# Patient Record
Sex: FEMALE | Race: WHITE | NOT HISPANIC OR LATINO | Employment: FULL TIME | URBAN - METROPOLITAN AREA
[De-identification: names, ages, dates, MRNs, and addresses within clinical notes are randomized per-mention and may not be internally consistent; named-entity substitution may affect disease eponyms.]

---

## 2017-01-23 RX ORDER — DAPAGLIFLOZIN 10 MG/1
TABLET, FILM COATED ORAL
Qty: 30 TABLET | Refills: 0 | Status: SHIPPED | OUTPATIENT
Start: 2017-01-23 | End: 2017-02-16 | Stop reason: SDUPTHER

## 2017-02-15 RX ORDER — OSELTAMIVIR PHOSPHATE 75 MG/1
75 CAPSULE ORAL 2 TIMES DAILY
Qty: 10 CAPSULE | Refills: 0 | Status: SHIPPED | OUTPATIENT
Start: 2017-02-15 | End: 2017-04-03

## 2017-02-16 RX ORDER — DAPAGLIFLOZIN 10 MG/1
TABLET, FILM COATED ORAL
Qty: 30 TABLET | Refills: 0 | Status: SHIPPED | OUTPATIENT
Start: 2017-02-16 | End: 2017-04-03 | Stop reason: SDUPTHER

## 2017-04-03 ENCOUNTER — OFFICE VISIT (OUTPATIENT)
Dept: INTERNAL MEDICINE | Facility: CLINIC | Age: 57
End: 2017-04-03

## 2017-04-03 ENCOUNTER — HOSPITAL ENCOUNTER (OUTPATIENT)
Dept: GENERAL RADIOLOGY | Facility: HOSPITAL | Age: 57
Discharge: HOME | End: 2017-04-03
Attending: INTERNAL MEDICINE | Admitting: INTERNAL MEDICINE

## 2017-04-03 VITALS
SYSTOLIC BLOOD PRESSURE: 109 MMHG | WEIGHT: 203 LBS | TEMPERATURE: 98.1 F | DIASTOLIC BLOOD PRESSURE: 76 MMHG | HEIGHT: 61 IN | HEART RATE: 79 BPM | BODY MASS INDEX: 38.33 KG/M2 | OXYGEN SATURATION: 98 %

## 2017-04-03 DIAGNOSIS — E11.9 NON-INSULIN DEPENDENT TYPE 2 DIABETES MELLITUS (HCC): ICD-10-CM

## 2017-04-03 DIAGNOSIS — E03.8 OTHER SPECIFIED HYPOTHYROIDISM: ICD-10-CM

## 2017-04-03 DIAGNOSIS — M25.552 HIP PAIN, LEFT: ICD-10-CM

## 2017-04-03 DIAGNOSIS — E78.49 OTHER HYPERLIPIDEMIA: Primary | ICD-10-CM

## 2017-04-03 DIAGNOSIS — E66.9 SIMPLE OBESITY: ICD-10-CM

## 2017-04-03 PROCEDURE — 99214 OFFICE O/P EST MOD 30 MIN: CPT | Performed by: INTERNAL MEDICINE

## 2017-04-03 PROCEDURE — 73502 X-RAY EXAM HIP UNI 2-3 VIEWS: CPT

## 2017-04-03 RX ORDER — ROSUVASTATIN CALCIUM 20 MG/1
20 TABLET, COATED ORAL DAILY
Qty: 90 TABLET | Refills: 3 | Status: SHIPPED | OUTPATIENT
Start: 2017-04-03 | End: 2018-06-16 | Stop reason: SDUPTHER

## 2017-04-04 ENCOUNTER — APPOINTMENT (OUTPATIENT)
Dept: WOMENS IMAGING | Facility: HOSPITAL | Age: 57
End: 2017-04-04

## 2017-04-04 LAB
BASOPHILS # BLD AUTO: 0.05 10*3/MM3 (ref 0–0.2)
BASOPHILS NFR BLD AUTO: 0.4 % (ref 0–1.5)
EOSINOPHIL # BLD AUTO: 0.17 10*3/MM3 (ref 0–0.7)
EOSINOPHIL NFR BLD AUTO: 1.4 % (ref 0.3–6.2)
ERYTHROCYTE [DISTWIDTH] IN BLOOD BY AUTOMATED COUNT: 15.2 % (ref 11.7–13)
HCT VFR BLD AUTO: 44.7 % (ref 35.6–45.5)
HGB BLD-MCNC: 14.2 G/DL (ref 11.9–15.5)
IMM GRANULOCYTES # BLD: 0.03 10*3/MM3 (ref 0–0.03)
IMM GRANULOCYTES NFR BLD: 0.2 % (ref 0–0.5)
LYMPHOCYTES # BLD AUTO: 3.79 10*3/MM3 (ref 0.9–4.8)
LYMPHOCYTES NFR BLD AUTO: 31.1 % (ref 19.6–45.3)
MCH RBC QN AUTO: 26.3 PG (ref 26.9–32)
MCHC RBC AUTO-ENTMCNC: 31.8 G/DL (ref 32.4–36.3)
MCV RBC AUTO: 82.9 FL (ref 80.5–98.2)
MONOCYTES # BLD AUTO: 0.64 10*3/MM3 (ref 0.2–1.2)
MONOCYTES NFR BLD AUTO: 5.3 % (ref 5–12)
NEUTROPHILS # BLD AUTO: 7.5 10*3/MM3 (ref 1.9–8.1)
NEUTROPHILS NFR BLD AUTO: 61.6 % (ref 42.7–76)
PLATELET # BLD AUTO: 429 10*3/MM3 (ref 140–500)
RBC # BLD AUTO: 5.39 10*6/MM3 (ref 3.9–5.2)
WBC # BLD AUTO: 12.18 10*3/MM3 (ref 4.5–10.7)

## 2017-04-04 PROCEDURE — 77067 SCR MAMMO BI INCL CAD: CPT | Performed by: RADIOLOGY

## 2017-04-04 RX ORDER — FENOFIBRATE 160 MG/1
TABLET ORAL
Qty: 90 TABLET | Refills: 1 | Status: SHIPPED | OUTPATIENT
Start: 2017-04-04 | End: 2017-12-21 | Stop reason: SDUPTHER

## 2017-04-09 PROBLEM — E11.9 TYPE 2 DIABETES MELLITUS: Status: ACTIVE | Noted: 2017-01-21

## 2017-04-09 PROBLEM — E11.9 CONTROLLED TYPE 2 DIABETES MELLITUS WITHOUT COMPLICATION: Status: ACTIVE | Noted: 2017-01-21

## 2017-04-09 NOTE — PROGRESS NOTES
Subjective   Lynda Rueda is a 56 y.o. female.   She is here today for hyperlipidemia and non-insulin-dependent type 2 diabetes hypothyroidism obesity left hip pain  History of Present Illness   She is here today for hyperlipidemia non-insulin-dependent type 2 diabetes hyperthyroidism obesity and left hip pain  The following portions of the patient's history were reviewed and updated as appropriate: allergies, current medications, past family history, past medical history, past social history, past surgical history and problem list.    Review of Systems   Musculoskeletal: Positive for arthralgias (left hip).   All other systems reviewed and are negative.      Objective   Physical Exam   Constitutional: She is oriented to person, place, and time. She appears well-developed and well-nourished. She is cooperative.   HENT:   Head: Normocephalic and atraumatic.   Right Ear: Hearing, tympanic membrane, external ear and ear canal normal.   Left Ear: Hearing, tympanic membrane, external ear and ear canal normal.   Nose: Nose normal.   Mouth/Throat: Uvula is midline, oropharynx is clear and moist and mucous membranes are normal.   Eyes: Conjunctivae, EOM and lids are normal. Pupils are equal, round, and reactive to light.   Neck: Phonation normal. Neck supple. Carotid bruit is not present.   Cardiovascular: Normal rate, regular rhythm and normal heart sounds.  Exam reveals no gallop and no friction rub.    No murmur heard.  Pulmonary/Chest: Effort normal and breath sounds normal. No respiratory distress.   Abdominal: Soft. Bowel sounds are normal. She exhibits no distension and no mass. There is no hepatosplenomegaly. There is no tenderness. There is no rebound and no guarding. No hernia.   Musculoskeletal: She exhibits no edema.        Left hip: She exhibits tenderness.   Neurological: She is alert and oriented to person, place, and time. Coordination and gait normal.   Skin: Skin is warm and dry.   Psychiatric: She  has a normal mood and affect. Her speech is normal and behavior is normal. Judgment and thought content normal.   Nursing note and vitals reviewed.      Assessment/Plan   Lnyda was seen today for diabetes and insomnia.    Diagnoses and all orders for this visit:    Other hyperlipidemia  -     CBC & Differential    Non-insulin dependent type 2 diabetes mellitus  -     CBC & Differential    Other specified hypothyroidism  -     CBC & Differential    Simple obesity  -     CBC & Differential    Hip pain, left  -     XR hip w or wo pelvis 2-3 view left    Other orders  -     rosuvastatin (CRESTOR) 20 MG tablet; Take 1 tablet by mouth Daily.  -     Dapagliflozin Propanediol (FARXIGA) 10 MG tablet; Take 1 tablet/day by mouth Daily. 1 tablet daily        Hyperlipidemia keep LDL less than 70 with proper diet exercise medication  NIDDM follow hemoglobin A 1C with yearly ophthalmology visits  Obesity weight loss with proper diet exercise medication  Left hip pain x-ray of hip

## 2017-04-18 ENCOUNTER — OFFICE VISIT (OUTPATIENT)
Dept: ORTHOPEDIC SURGERY | Facility: CLINIC | Age: 57
End: 2017-04-18

## 2017-04-18 VITALS — WEIGHT: 204 LBS | HEIGHT: 61 IN | TEMPERATURE: 97.6 F | BODY MASS INDEX: 38.51 KG/M2

## 2017-04-18 DIAGNOSIS — M25.552 PAIN OF LEFT HIP JOINT: Primary | ICD-10-CM

## 2017-04-18 PROCEDURE — 99203 OFFICE O/P NEW LOW 30 MIN: CPT | Performed by: NURSE PRACTITIONER

## 2017-04-18 RX ORDER — LANCETS 33 GAUGE
EACH MISCELLANEOUS
COMMUNITY
Start: 2017-04-03 | End: 2018-03-01

## 2017-04-18 RX ORDER — ERGOCALCIFEROL 1.25 MG/1
50000 CAPSULE ORAL
COMMUNITY
Start: 2017-04-06

## 2017-04-18 NOTE — PROGRESS NOTES
Patient: Lynda Rueda  YOB: 1960 56 y.o. female  Medical Record Number: [id number]    Chief Complaints:   Chief Complaint   Patient presents with   • Right Hip - Pain   • Left Hip - Pain       History of Present Illness:Lynda Rueda is a 56 y.o. female who presentsWith complaints of hip pain left greater than right.  Patient reports it started about 6-8 months ago.  Denies any specific injury.  She describes the hip pain as a moderate to severe intermittent stabbing type pain with popping and clicking.  Sitting makes the pain worse rest makes the pain slightly better.  She recently saw her primary care physician and they sent her for x-rays.  X-rays showed no arthritis but questionable impingement morphology.    Allergies:   Allergies   Allergen Reactions   • Celecoxib Hives   • Methocarbamol Hives   • Montelukast Hives   • Penicillins Hives   • Carbomer Other (See Comments)     UNKNOWN REACTION   • [place] Other (See Comments)     UNKNOWN PER PT   • Gemifloxacin    • Nickel Other (See Comments)     WHEN PT TOOK THE ALLERGY TEST NICKEL, ACRYLIC AND COBALT CAME UP AS ALLERGIES       Medications:   Current Outpatient Prescriptions   Medication Sig Dispense Refill   • citalopram (CeleXA) 20 MG tablet TAKE ONE TABLET BY MOUTH DAILY 90 tablet 1   • Dapagliflozin Propanediol (FARXIGA) 10 MG tablet Take 1 tablet/day by mouth Daily. 1 tablet daily 90 tablet 3   • estradiol (MINIVELLE, VIVELLE-DOT) 0.05 MG/24HR patch Place 1 patch on the skin 2 (two) times a week. AS DIRECTED     • fenofibrate 160 MG tablet TAKE ONE TABLET BY MOUTH DAILY 90 tablet 1   • glipiZIDE (GLUCOTROL) 10 MG tablet Take 1 tablet (10 mg total) by mouth 2 (two) times a day. 180 tablet 2   • glucose blood test strip OneTouch Ultra Blue In Vitro Strip; Patient Sig: OneTouch Ultra Blue In Vitro Strip ; 100; 0; 11-Nov-2013; Active     • ONETOUCH DELICA LANCETS 33G misc      • rosuvastatin (CRESTOR) 20 MG tablet Take 1 tablet by  "mouth Daily. 90 tablet 3   • sitaGLIPtin-metFORMIN (JANUMET)  MG per tablet Take 1 tablet by mouth 2 (Two) Times a Day With Meals.     • SYNTHROID 125 MCG tablet      • valsartan-hydrochlorothiazide (DIOVAN-HCT) 80-12.5 MG per tablet TAKE ONE TABLET BY MOUTH EVERY DAY 90 tablet 1   • vitamin D (ERGOCALCIFEROL) 08268 UNITS capsule capsule      • triamcinolone (KENALOG) 0.1 % ointment As Needed.       No current facility-administered medications for this visit.          The following portions of the patient's history were reviewed and updated as appropriate: allergies, current medications, past family history, past medical history, past social history, past surgical history and problem list.    Review of Systems:   A 14 point review of systems was performed. All systems negative except pertinent positives/negative listed in HPI above    Physical Exam:   Vitals:    04/18/17 1447   Temp: 97.6 °F (36.4 °C)   Weight: 204 lb (92.5 kg)   Height: 61\" (154.9 cm)       General: A and O x 3, ASA, NAD    SCLERA:    Normal    DENTITION:   NormalSkin clear no unusual lesions noted  Bilateral hips patient is nontender palpation she has pain upon internal extra rotation with a positive Stinchfield on the left negative on the right negative logroll bilaterally neurologic intact pedal pulses normal bilaterally    Radiology:  Xrays previous x-rays of the hips were reviewed and the patient appears to have a cam lesions noted bilaterally left greater than right.    Assessment/Plan:  Questionable labral tear left hip    We will proceed with an MR arthrogram of the left hip bursa.  She may very well have a labral tear.  She will call us couple days after the MRI arthrogram to discuss results and treatment options.  We will hold off on workup of the right hip since the left hip is worse at this point.  "

## 2017-06-02 ENCOUNTER — TELEPHONE (OUTPATIENT)
Dept: INTERNAL MEDICINE | Facility: CLINIC | Age: 57
End: 2017-06-02

## 2017-06-02 RX ORDER — METHYLPREDNISOLONE 4 MG/1
TABLET ORAL
Qty: 21 TABLET | Refills: 0 | Status: SHIPPED | OUTPATIENT
Start: 2017-06-02 | End: 2017-08-23

## 2017-06-14 RX ORDER — CITALOPRAM 20 MG/1
20 TABLET ORAL DAILY
Qty: 90 TABLET | Refills: 1 | Status: SHIPPED | OUTPATIENT
Start: 2017-06-14 | End: 2017-12-10 | Stop reason: SDUPTHER

## 2017-06-14 RX ORDER — VALSARTAN AND HYDROCHLOROTHIAZIDE 80; 12.5 MG/1; MG/1
1 TABLET, FILM COATED ORAL DAILY
Qty: 90 TABLET | Refills: 1 | Status: SHIPPED | OUTPATIENT
Start: 2017-06-14 | End: 2017-12-10 | Stop reason: SDUPTHER

## 2017-07-21 DIAGNOSIS — J38.00 VOCAL CORD PARALYSIS: Primary | ICD-10-CM

## 2017-08-23 ENCOUNTER — OFFICE VISIT (OUTPATIENT)
Dept: INTERNAL MEDICINE | Facility: CLINIC | Age: 57
End: 2017-08-23

## 2017-08-23 VITALS
RESPIRATION RATE: 16 BRPM | DIASTOLIC BLOOD PRESSURE: 80 MMHG | SYSTOLIC BLOOD PRESSURE: 118 MMHG | HEIGHT: 61 IN | TEMPERATURE: 97.3 F | HEART RATE: 87 BPM | BODY MASS INDEX: 37.76 KG/M2 | OXYGEN SATURATION: 97 % | WEIGHT: 200 LBS

## 2017-08-23 DIAGNOSIS — M54.5 ACUTE MIDLINE LOW BACK PAIN, WITH SCIATICA PRESENCE UNSPECIFIED: ICD-10-CM

## 2017-08-23 DIAGNOSIS — R10.9 ABDOMINAL CRAMPING: Primary | ICD-10-CM

## 2017-08-23 DIAGNOSIS — R10.9 FLANK PAIN: ICD-10-CM

## 2017-08-23 PROCEDURE — 99213 OFFICE O/P EST LOW 20 MIN: CPT | Performed by: NURSE PRACTITIONER

## 2017-08-23 RX ORDER — METFORMIN HYDROCHLORIDE 500 MG/1
1000 TABLET, EXTENDED RELEASE ORAL 2 TIMES DAILY
COMMUNITY
Start: 2017-07-17

## 2017-08-23 NOTE — PROGRESS NOTES
"Vitals:    08/23/17 1538   BP: 118/80   Pulse: 87   Resp: 16   Temp: 97.3 °F (36.3 °C)   SpO2: 97%     Last 2 weights    08/23/17  1538   Weight: 200 lb (90.7 kg)     Social History   Substance Use Topics   • Smoking status: Never Smoker   • Smokeless tobacco: Not on file   • Alcohol use Yes      Comment: SOCIAL DRINKER        Subjective     HPI  Pt presents to office today with low back pain that originated in spinal area and radiates outward, and also some abdominal discomfort which pt describes as \"someone pinching inside of her\". She states that this has been going on for several days and the pain isn't excrusiating but bothersome. She does lift heavy object at her work. She does not have a gallbladder, appendix, and has had a hysterectomy done. Last colonoscopy was normal. She denies fever, n/v, UTI symptoms, URI symptoms, weight loss, night sweats, changes in bowel movements. She has regular BM daily, and urinating without issues.    The following portions of the patient's history were reviewed and updated as appropriate: allergies, current medications, past medical history, past social history and problem list.    Review of Systems   Constitutional: Negative.    Respiratory: Negative.    Cardiovascular: Negative.    Gastrointestinal: Positive for abdominal pain (discomfort).   Musculoskeletal: Positive for back pain (lumbar).       Objective     Physical Exam   Constitutional: She is oriented to person, place, and time. Vital signs are normal. She appears well-developed and well-nourished.   HENT:   Head: Normocephalic and atraumatic.   Neck: Normal range of motion.   Cardiovascular: Normal rate, regular rhythm and normal heart sounds.    Pulmonary/Chest: Effort normal and breath sounds normal.   Abdominal: There is tenderness in the suprapubic area.   Musculoskeletal: Normal range of motion.   Neurological: She is oriented to person, place, and time.   Nursing note and vitals reviewed.      Assessment/Plan "   Lynda was seen today for abdominal pain and back pain.    Diagnoses and all orders for this visit:    Abdominal cramping  -     CBC & Differential  -     Comprehensive Metabolic Panel  -     Urinalysis With / Culture If Indicated  -     Lipase  -     Amylase    Flank pain  -     CBC & Differential  -     Comprehensive Metabolic Panel  -     Urinalysis With / Culture If Indicated  -     Lipase  -     Amylase    Acute midline low back pain, with sciatica presence unspecified           -poss nerve related problem? Check labs for starting point.   -cont home meds  -nsaids, rest, ice/heat therapy  -cont home meds  -FU prn or if symptoms persist/worsen

## 2017-08-25 ENCOUNTER — OFFICE VISIT (OUTPATIENT)
Dept: ORTHOPEDIC SURGERY | Facility: CLINIC | Age: 57
End: 2017-08-25

## 2017-08-25 VITALS — WEIGHT: 200 LBS | HEIGHT: 61 IN | BODY MASS INDEX: 37.76 KG/M2 | TEMPERATURE: 98 F

## 2017-08-25 DIAGNOSIS — G89.29 CHRONIC PAIN OF RIGHT KNEE: Primary | ICD-10-CM

## 2017-08-25 DIAGNOSIS — M25.561 CHRONIC PAIN OF RIGHT KNEE: Primary | ICD-10-CM

## 2017-08-25 LAB
ALBUMIN SERPL-MCNC: 4.6 G/DL (ref 3.5–5.2)
ALBUMIN/GLOB SERPL: 1.6 G/DL
ALP SERPL-CCNC: 70 U/L (ref 39–117)
ALT SERPL-CCNC: 20 U/L (ref 1–33)
AMYLASE SERPL-CCNC: 32 U/L (ref 28–100)
APPEARANCE UR: CLEAR
AST SERPL-CCNC: 22 U/L (ref 1–32)
BACTERIA #/AREA URNS HPF: ABNORMAL /HPF
BACTERIA UR CULT: NORMAL
BACTERIA UR CULT: NORMAL
BASOPHILS # BLD AUTO: 0.08 10*3/MM3 (ref 0–0.2)
BASOPHILS NFR BLD AUTO: 0.7 % (ref 0–1.5)
BILIRUB SERPL-MCNC: 0.3 MG/DL (ref 0.1–1.2)
BILIRUB UR QL STRIP: NEGATIVE
BUN SERPL-MCNC: 9 MG/DL (ref 6–20)
BUN/CREAT SERPL: 15 (ref 7–25)
CALCIUM SERPL-MCNC: 10.3 MG/DL (ref 8.6–10.5)
CHLORIDE SERPL-SCNC: 96 MMOL/L (ref 98–107)
CO2 SERPL-SCNC: 28.7 MMOL/L (ref 22–29)
COLOR UR: YELLOW
CREAT SERPL-MCNC: 0.6 MG/DL (ref 0.57–1)
EOSINOPHIL # BLD AUTO: 0.12 10*3/MM3 (ref 0–0.7)
EOSINOPHIL NFR BLD AUTO: 1.1 % (ref 0.3–6.2)
EPI CELLS #/AREA URNS HPF: ABNORMAL /HPF
ERYTHROCYTE [DISTWIDTH] IN BLOOD BY AUTOMATED COUNT: 13.9 % (ref 11.7–13)
GLOBULIN SER CALC-MCNC: 2.9 GM/DL
GLUCOSE SERPL-MCNC: 173 MG/DL (ref 65–99)
GLUCOSE UR QL: ABNORMAL
HCT VFR BLD AUTO: 44.5 % (ref 35.6–45.5)
HGB BLD-MCNC: 13.9 G/DL (ref 11.9–15.5)
HGB UR QL STRIP: NEGATIVE
IMM GRANULOCYTES # BLD: 0.03 10*3/MM3 (ref 0–0.03)
IMM GRANULOCYTES NFR BLD: 0.3 % (ref 0–0.5)
KETONES UR QL STRIP: NEGATIVE
LEUKOCYTE ESTERASE UR QL STRIP: NEGATIVE
LIPASE SERPL-CCNC: 56 U/L (ref 13–60)
LYMPHOCYTES # BLD AUTO: 3.67 10*3/MM3 (ref 0.9–4.8)
LYMPHOCYTES NFR BLD AUTO: 32.9 % (ref 19.6–45.3)
MCH RBC QN AUTO: 26.5 PG (ref 26.9–32)
MCHC RBC AUTO-ENTMCNC: 31.2 G/DL (ref 32.4–36.3)
MCV RBC AUTO: 84.8 FL (ref 80.5–98.2)
MICRO URNS: ABNORMAL
MICRO URNS: ABNORMAL
MONOCYTES # BLD AUTO: 0.79 10*3/MM3 (ref 0.2–1.2)
MONOCYTES NFR BLD AUTO: 7.1 % (ref 5–12)
MUCOUS THREADS URNS QL MICRO: PRESENT /HPF
NEUTROPHILS # BLD AUTO: 6.48 10*3/MM3 (ref 1.9–8.1)
NEUTROPHILS NFR BLD AUTO: 57.9 % (ref 42.7–76)
NITRITE UR QL STRIP: NEGATIVE
PH UR STRIP: 6 [PH] (ref 5–7.5)
PLATELET # BLD AUTO: 362 10*3/MM3 (ref 140–500)
POTASSIUM SERPL-SCNC: 3.8 MMOL/L (ref 3.5–5.2)
PROT SERPL-MCNC: 7.5 G/DL (ref 6–8.5)
PROT UR QL STRIP: NEGATIVE
RBC # BLD AUTO: 5.25 10*6/MM3 (ref 3.9–5.2)
RBC #/AREA URNS HPF: ABNORMAL /HPF
SODIUM SERPL-SCNC: 139 MMOL/L (ref 136–145)
SP GR UR: >=1.03 (ref 1–1.03)
URINALYSIS REFLEX: ABNORMAL
UROBILINOGEN UR STRIP-MCNC: 0.2 MG/DL (ref 0.2–1)
WBC # BLD AUTO: 11.17 10*3/MM3 (ref 4.5–10.7)
WBC #/AREA URNS HPF: ABNORMAL /HPF

## 2017-08-25 PROCEDURE — 99213 OFFICE O/P EST LOW 20 MIN: CPT | Performed by: NURSE PRACTITIONER

## 2017-08-25 PROCEDURE — 73562 X-RAY EXAM OF KNEE 3: CPT | Performed by: NURSE PRACTITIONER

## 2017-08-25 RX ORDER — NITROFURANTOIN 25; 75 MG/1; MG/1
100 CAPSULE ORAL 2 TIMES DAILY
Qty: 14 CAPSULE | Refills: 0 | Status: SHIPPED | OUTPATIENT
Start: 2017-08-25 | End: 2018-03-01

## 2017-08-25 RX ORDER — NITROFURANTOIN 25; 75 MG/1; MG/1
100 CAPSULE ORAL 2 TIMES DAILY
Qty: 14 CAPSULE | Refills: 0 | Status: SHIPPED | OUTPATIENT
Start: 2017-08-25 | End: 2017-08-25 | Stop reason: SDUPTHER

## 2017-08-25 NOTE — PROGRESS NOTES
"Patient: Lynda Rueda  YOB: 1960 56 y.o. female  Medical Record Number: [id number]    Chief Complaints:   Chief Complaint   Patient presents with   • Right Knee - Establish Care, Pain       History of Present Illness:Lynda Rueda is a 56 y.o. female who presents With complaints of acute onset onset of right knee pain.  Apparently the patient tripped 3 weeks ago while walking across the room and fell into a wall.  She started with moderate to severe intermittent right knee pain at that time and describes it as a stabbing burning type pain with intermittent swelling.  She reports that the pain is worse with sitting and driving and slightly better with ibuprofen.  She had a previous right knee arthroscopy for torn meniscus and reports\" it feels exactly the same\", the patient reports occasional locking and catching of the knee as well.    Allergies:   Allergies   Allergen Reactions   • Celecoxib Hives   • Methocarbamol Hives   • Montelukast Hives   • Penicillins Hives   • Carbomer Other (See Comments)     UNKNOWN REACTION   • [place] Other (See Comments)     UNKNOWN PER PT   • Gemifloxacin    • Nickel Other (See Comments)     WHEN PT TOOK THE ALLERGY TEST NICKEL, ACRYLIC AND COBALT CAME UP AS ALLERGIES       Medications:   Current Outpatient Prescriptions   Medication Sig Dispense Refill   • citalopram (CeleXA) 20 MG tablet Take 1 tablet by mouth Daily. 90 tablet 1   • Dapagliflozin Propanediol (FARXIGA) 10 MG tablet Take 1 tablet/day by mouth Daily. 1 tablet daily 90 tablet 3   • glipiZIDE (GLUCOTROL) 10 MG tablet Take 1 tablet (10 mg total) by mouth 2 (two) times a day. 180 tablet 2   • metFORMIN ER (GLUCOPHAGE-XR) 500 MG 24 hr tablet Take 1,000 mg by mouth.     • rosuvastatin (CRESTOR) 20 MG tablet Take 1 tablet by mouth Daily. 90 tablet 3   • valsartan-hydrochlorothiazide (DIOVAN-HCT) 80-12.5 MG per tablet Take 1 tablet by mouth Daily. 90 tablet 1   • estradiol (MINIVELLE, VIVELLE-DOT) 0.05 " "MG/24HR patch Place 1 patch on the skin 2 (two) times a week. AS DIRECTED     • fenofibrate 160 MG tablet TAKE ONE TABLET BY MOUTH DAILY 90 tablet 1   • glucose blood test strip OneTouch Ultra Blue In Vitro Strip; Patient Sig: OneTouch Ultra Blue In Vitro Strip ; 100; 0; 11-Nov-2013; Active     • linagliptin (TRADJENTA) 5 MG tablet tablet Take 5 mg by mouth.     • nitrofurantoin, macrocrystal-monohydrate, (MACROBID) 100 MG capsule Take 1 capsule by mouth 2 (Two) Times a Day. 14 capsule 0   • ONETOUCH DELICA LANCETS 33G misc      • sitaGLIPtin-metFORMIN (JANUMET)  MG per tablet Take 1 tablet by mouth 2 (Two) Times a Day With Meals.     • SYNTHROID 125 MCG tablet      • triamcinolone (KENALOG) 0.1 % ointment As Needed.     • vitamin D (ERGOCALCIFEROL) 80765 UNITS capsule capsule        No current facility-administered medications for this visit.          The following portions of the patient's history were reviewed and updated as appropriate: allergies, current medications, past family history, past medical history, past social history, past surgical history and problem list.    Review of Systems:   A 14 point review of systems was performed. All systems negative except pertinent positives/negative listed in HPI above    Physical Exam:   Vitals:    08/25/17 1556   Temp: 98 °F (36.7 °C)   TempSrc: Temporal Artery    Weight: 200 lb (90.7 kg)   Height: 61\" (154.9 cm)       General: A and O x 3, ASA, NAD    SCLERA:    Normal    DENTITION:   Normal  Skin clear no unusual lesions noted  Right knee patient does have trace amount of effusion noted with 115° flexion neutral in extension with a positive Samir negative Lockman calf is soft and nontender    Radiology:  Xrays 3views (ap,lateral, sunrise) right knee were ordered and reviewed today secondary to pain and show mild arthritic changes of the medial compartment, comparative views show progression in arthritis     Assessment/Plan:  Right knee pain with " mechanical symptoms coupled with mild arthritic changes    The patient was adamant that she has had no problems prior to her recent injury.  She would like to proceed with MRI of the right knee to see what it looks like.  I explained to the patient she does have some arthritic changes and knee arthroscopy at this point may not be an option.  However we will look further into the knee and also check her meniscus and make a decision after we get the results back.

## 2017-08-30 ENCOUNTER — OFFICE VISIT (OUTPATIENT)
Dept: ORTHOPEDIC SURGERY | Facility: CLINIC | Age: 57
End: 2017-08-30

## 2017-08-30 DIAGNOSIS — M25.561 CHRONIC PAIN OF RIGHT KNEE: ICD-10-CM

## 2017-08-30 DIAGNOSIS — G89.29 CHRONIC PAIN OF RIGHT KNEE: ICD-10-CM

## 2017-08-30 PROCEDURE — 73721 MRI JNT OF LWR EXTRE W/O DYE: CPT | Performed by: NURSE PRACTITIONER

## 2017-09-01 ENCOUNTER — TELEPHONE (OUTPATIENT)
Dept: ORTHOPEDIC SURGERY | Facility: CLINIC | Age: 57
End: 2017-09-01

## 2017-09-01 NOTE — TELEPHONE ENCOUNTER
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  Unfortunately a knee scope would not be helpful at this point.  Would recommend that she come in for a cortisone injection and physical therapy.

## 2017-09-05 ENCOUNTER — TELEPHONE (OUTPATIENT)
Dept: ORTHOPEDIC SURGERY | Facility: CLINIC | Age: 57
End: 2017-09-05

## 2017-09-05 NOTE — TELEPHONE ENCOUNTER
----- Message from Anna Johansen MA sent at 9/1/2017  4:59 PM EDT -----  Please give the patient a call in regards to getting her on the schedule for a Cortisone injection.

## 2017-09-08 ENCOUNTER — CLINICAL SUPPORT (OUTPATIENT)
Dept: ORTHOPEDIC SURGERY | Facility: CLINIC | Age: 57
End: 2017-09-08

## 2017-09-08 VITALS — TEMPERATURE: 98.7 F | WEIGHT: 201 LBS | BODY MASS INDEX: 37.95 KG/M2 | HEIGHT: 61 IN

## 2017-09-08 DIAGNOSIS — M25.561 CHRONIC PAIN OF RIGHT KNEE: Primary | ICD-10-CM

## 2017-09-08 DIAGNOSIS — G89.29 CHRONIC PAIN OF RIGHT KNEE: Primary | ICD-10-CM

## 2017-09-08 PROCEDURE — 20610 DRAIN/INJ JOINT/BURSA W/O US: CPT | Performed by: NURSE PRACTITIONER

## 2017-09-08 RX ORDER — BUPIVACAINE HYDROCHLORIDE 5 MG/ML
2 INJECTION, SOLUTION EPIDURAL; INTRACAUDAL
Status: COMPLETED | OUTPATIENT
Start: 2017-09-08 | End: 2017-09-08

## 2017-09-08 RX ORDER — METHYLPREDNISOLONE ACETATE 80 MG/ML
80 INJECTION, SUSPENSION INTRA-ARTICULAR; INTRALESIONAL; INTRAMUSCULAR; SOFT TISSUE
Status: COMPLETED | OUTPATIENT
Start: 2017-09-08 | End: 2017-09-08

## 2017-09-08 RX ADMIN — METHYLPREDNISOLONE ACETATE 80 MG: 80 INJECTION, SUSPENSION INTRA-ARTICULAR; INTRALESIONAL; INTRAMUSCULAR; SOFT TISSUE at 14:59

## 2017-09-08 RX ADMIN — BUPIVACAINE HYDROCHLORIDE 2 ML: 5 INJECTION, SOLUTION EPIDURAL; INTRACAUDAL at 14:59

## 2017-09-08 NOTE — PROGRESS NOTES
9/8/2017    Lynda Rueda is here today for worsening knee pain. Pt has undergone injection of the knee in the past with good resolution of symptoms. Pt is requesting a repeat injection.     KNEE Injection Procedure Note:    Large Joint Arthrocentesis  Date/Time: 9/8/2017 2:59 PM  Consent given by: patient  Site marked: site marked  Timeout: Immediately prior to procedure a time out was called to verify the correct patient, procedure, equipment, support staff and site/side marked as required   Supporting Documentation  Indications: pain and joint swelling   Procedure Details  Location: knee - R knee  Preparation: Patient was prepped and draped in the usual sterile fashion  Needle size: 18 G  Approach: anterolateral  Medications administered: 2 mL bupivacaine (PF) 0.5 %; 80 mg methylPREDNISolone acetate 80 MG/ML  Patient tolerance: patient tolerated the procedure well with no immediate complications      Prior to the injection complications including but not limited to pain, diabetes, infection, were discussed and patient verbalized understanding and would like to proceed with injection    At the conclusion of the injection I discussed the importance of continued quad strengthening exercises on a daily basis. I will see the patient back if the symptoms should fail to improve or worsen.    Eugenia Gupta, APRN  9/8/2017

## 2017-11-27 ENCOUNTER — TELEPHONE (OUTPATIENT)
Dept: INTERNAL MEDICINE | Facility: CLINIC | Age: 57
End: 2017-11-27

## 2017-11-27 NOTE — TELEPHONE ENCOUNTER
Pt called stating she has a terrible sinus infection and soul like to know if you would call her in meds.

## 2017-11-28 ENCOUNTER — TELEPHONE (OUTPATIENT)
Dept: INTERNAL MEDICINE | Facility: CLINIC | Age: 57
End: 2017-11-28

## 2017-11-28 RX ORDER — AZITHROMYCIN 250 MG/1
TABLET, FILM COATED ORAL
Qty: 6 TABLET | Refills: 0 | Status: SHIPPED | OUTPATIENT
Start: 2017-11-28 | End: 2018-03-01

## 2017-11-28 RX ORDER — METHYLPREDNISOLONE 4 MG/1
TABLET ORAL
Qty: 21 TABLET | Refills: 0 | Status: SHIPPED | OUTPATIENT
Start: 2017-11-28 | End: 2018-03-01

## 2017-12-11 RX ORDER — VALSARTAN AND HYDROCHLOROTHIAZIDE 80; 12.5 MG/1; MG/1
TABLET, FILM COATED ORAL
Qty: 90 TABLET | Refills: 1 | Status: SHIPPED | OUTPATIENT
Start: 2017-12-11 | End: 2018-04-05

## 2017-12-11 RX ORDER — CITALOPRAM 20 MG/1
TABLET ORAL
Qty: 90 TABLET | Refills: 1 | Status: SHIPPED | OUTPATIENT
Start: 2017-12-11 | End: 2018-06-07 | Stop reason: SDUPTHER

## 2017-12-21 RX ORDER — FENOFIBRATE 160 MG/1
TABLET ORAL
Qty: 90 TABLET | Refills: 0 | Status: SHIPPED | OUTPATIENT
Start: 2017-12-21 | End: 2018-03-19 | Stop reason: SDUPTHER

## 2018-03-01 ENCOUNTER — HOSPITAL ENCOUNTER (OUTPATIENT)
Dept: CARDIOLOGY | Facility: HOSPITAL | Age: 58
Discharge: HOME | End: 2018-03-01
Attending: INTERNAL MEDICINE

## 2018-03-01 ENCOUNTER — OFFICE VISIT (OUTPATIENT)
Dept: INTERNAL MEDICINE | Facility: CLINIC | Age: 58
End: 2018-03-01

## 2018-03-01 ENCOUNTER — HOSPITAL ENCOUNTER (OUTPATIENT)
Dept: CARDIOLOGY | Facility: HOSPITAL | Age: 58
Discharge: HOME | End: 2018-03-01
Admitting: INTERNAL MEDICINE

## 2018-03-01 VITALS
DIASTOLIC BLOOD PRESSURE: 58 MMHG | SYSTOLIC BLOOD PRESSURE: 94 MMHG | OXYGEN SATURATION: 96 % | HEIGHT: 61 IN | BODY MASS INDEX: 37.76 KG/M2 | HEART RATE: 78 BPM | WEIGHT: 200 LBS

## 2018-03-01 VITALS
WEIGHT: 200 LBS | SYSTOLIC BLOOD PRESSURE: 94 MMHG | DIASTOLIC BLOOD PRESSURE: 58 MMHG | HEART RATE: 77 BPM | HEIGHT: 61 IN | BODY MASS INDEX: 37.76 KG/M2 | OXYGEN SATURATION: 96 %

## 2018-03-01 VITALS
TEMPERATURE: 98 F | HEIGHT: 61 IN | DIASTOLIC BLOOD PRESSURE: 77 MMHG | BODY MASS INDEX: 38.51 KG/M2 | HEART RATE: 97 BPM | OXYGEN SATURATION: 96 % | SYSTOLIC BLOOD PRESSURE: 110 MMHG | WEIGHT: 204 LBS

## 2018-03-01 DIAGNOSIS — R07.89 CHEST PRESSURE: Primary | ICD-10-CM

## 2018-03-01 DIAGNOSIS — R07.2 PRECORDIAL PAIN: ICD-10-CM

## 2018-03-01 DIAGNOSIS — R07.9 CHEST PAIN, UNSPECIFIED TYPE: Primary | ICD-10-CM

## 2018-03-01 DIAGNOSIS — I10 ESSENTIAL HYPERTENSION: ICD-10-CM

## 2018-03-01 DIAGNOSIS — E11.9 TYPE 2 DIABETES MELLITUS WITHOUT COMPLICATION, WITHOUT LONG-TERM CURRENT USE OF INSULIN (HCC): ICD-10-CM

## 2018-03-01 DIAGNOSIS — R94.31 EKG, ABNORMAL: ICD-10-CM

## 2018-03-01 DIAGNOSIS — R01.1 MURMUR: ICD-10-CM

## 2018-03-01 DIAGNOSIS — E78.2 MIXED HYPERLIPIDEMIA: ICD-10-CM

## 2018-03-01 LAB
ALBUMIN SERPL-MCNC: 4.3 G/DL (ref 3.5–5.2)
ALBUMIN/GLOB SERPL: 1.7 G/DL
ALP SERPL-CCNC: 45 U/L (ref 39–117)
ALT SERPL W P-5'-P-CCNC: 16 U/L (ref 1–33)
ANION GAP SERPL CALCULATED.3IONS-SCNC: 13.2 MMOL/L
ASCENDING AORTA: 2.8 CM
AST SERPL-CCNC: 16 U/L (ref 1–32)
BASOPHILS # BLD AUTO: 0.04 10*3/MM3 (ref 0–0.2)
BASOPHILS NFR BLD AUTO: 0.4 % (ref 0–1.5)
BH CV ECHO MEAS - ACS: 2.1 CM
BH CV ECHO MEAS - AO MAX PG (FULL): 1 MMHG
BH CV ECHO MEAS - AO MAX PG: 12.6 MMHG
BH CV ECHO MEAS - AO MEAN PG (FULL): 1.6 MMHG
BH CV ECHO MEAS - AO MEAN PG: 7.6 MMHG
BH CV ECHO MEAS - AO ROOT AREA (BSA CORRECTED): 1.4
BH CV ECHO MEAS - AO ROOT AREA: 5.7 CM^2
BH CV ECHO MEAS - AO ROOT DIAM: 2.7 CM
BH CV ECHO MEAS - AO V2 MAX: 177.4 CM/SEC
BH CV ECHO MEAS - AO V2 MEAN: 132.8 CM/SEC
BH CV ECHO MEAS - AO V2 VTI: 34.6 CM
BH CV ECHO MEAS - AVA(I,A): 2.9 CM^2
BH CV ECHO MEAS - AVA(I,D): 2.9 CM^2
BH CV ECHO MEAS - AVA(V,A): 2.8 CM^2
BH CV ECHO MEAS - AVA(V,D): 2.8 CM^2
BH CV ECHO MEAS - BSA(HAYCOCK): 2 M^2
BH CV ECHO MEAS - BSA: 1.9 M^2
BH CV ECHO MEAS - BZI_BMI: 37.8 KILOGRAMS/M^2
BH CV ECHO MEAS - BZI_METRIC_HEIGHT: 154.9 CM
BH CV ECHO MEAS - BZI_METRIC_WEIGHT: 90.7 KG
BH CV ECHO MEAS - CONTRAST EF (2CH): 63.5 ML/M^2
BH CV ECHO MEAS - CONTRAST EF 4CH: 60.4 ML/M^2
BH CV ECHO MEAS - EDV(MOD-SP2): 63 ML
BH CV ECHO MEAS - EDV(MOD-SP4): 53 ML
BH CV ECHO MEAS - EDV(TEICH): 103.6 ML
BH CV ECHO MEAS - EF(CUBED): 75.1 %
BH CV ECHO MEAS - EF(MOD-SP2): 63.5 %
BH CV ECHO MEAS - EF(MOD-SP4): 60.4 %
BH CV ECHO MEAS - EF(TEICH): 67 %
BH CV ECHO MEAS - ESV(MOD-SP2): 23 ML
BH CV ECHO MEAS - ESV(MOD-SP4): 21 ML
BH CV ECHO MEAS - ESV(TEICH): 34.2 ML
BH CV ECHO MEAS - FS: 37.1 %
BH CV ECHO MEAS - IVS/LVPW: 1.1
BH CV ECHO MEAS - IVSD: 0.86 CM
BH CV ECHO MEAS - LAT PEAK E' VEL: 12 CM/SEC
BH CV ECHO MEAS - LV DIASTOLIC VOL/BSA (35-75): 28.1 ML/M^2
BH CV ECHO MEAS - LV MASS(C)D: 128 GRAMS
BH CV ECHO MEAS - LV MASS(C)DI: 67.8 GRAMS/M^2
BH CV ECHO MEAS - LV MAX PG: 11.6 MMHG
BH CV ECHO MEAS - LV MEAN PG: 6 MMHG
BH CV ECHO MEAS - LV SYSTOLIC VOL/BSA (12-30): 11.1 ML/M^2
BH CV ECHO MEAS - LV V1 MAX: 170.2 CM/SEC
BH CV ECHO MEAS - LV V1 MEAN: 113.6 CM/SEC
BH CV ECHO MEAS - LV V1 VTI: 35.2 CM
BH CV ECHO MEAS - LVIDD: 4.7 CM
BH CV ECHO MEAS - LVIDS: 3 CM
BH CV ECHO MEAS - LVLD AP2: 7.5 CM
BH CV ECHO MEAS - LVLD AP4: 7.1 CM
BH CV ECHO MEAS - LVLS AP2: 5.9 CM
BH CV ECHO MEAS - LVLS AP4: 5.7 CM
BH CV ECHO MEAS - LVOT AREA (M): 2.8 CM^2
BH CV ECHO MEAS - LVOT AREA: 2.9 CM^2
BH CV ECHO MEAS - LVOT DIAM: 1.9 CM
BH CV ECHO MEAS - LVPWD: 0.79 CM
BH CV ECHO MEAS - MED PEAK E' VEL: 11 CM/SEC
BH CV ECHO MEAS - MR MAX PG: 20.8 MMHG
BH CV ECHO MEAS - MR MAX VEL: 227.9 CM/SEC
BH CV ECHO MEAS - MV A DUR: 0.14 SEC
BH CV ECHO MEAS - MV A MAX VEL: 103.3 CM/SEC
BH CV ECHO MEAS - MV DEC SLOPE: 316 CM/SEC^2
BH CV ECHO MEAS - MV DEC TIME: 0.24 SEC
BH CV ECHO MEAS - MV E MAX VEL: 76.2 CM/SEC
BH CV ECHO MEAS - MV E/A: 0.74
BH CV ECHO MEAS - MV MAX PG: 4.5 MMHG
BH CV ECHO MEAS - MV MEAN PG: 2 MMHG
BH CV ECHO MEAS - MV P1/2T MAX VEL: 78.1 CM/SEC
BH CV ECHO MEAS - MV P1/2T: 72.4 MSEC
BH CV ECHO MEAS - MV V2 MAX: 105.7 CM/SEC
BH CV ECHO MEAS - MV V2 MEAN: 65.7 CM/SEC
BH CV ECHO MEAS - MV V2 VTI: 29.4 CM
BH CV ECHO MEAS - MVA P1/2T LCG: 2.8 CM^2
BH CV ECHO MEAS - MVA(P1/2T): 3 CM^2
BH CV ECHO MEAS - MVA(VTI): 3.5 CM^2
BH CV ECHO MEAS - PA ACC TIME: 0.16 SEC
BH CV ECHO MEAS - PA MAX PG (FULL): 2.2 MMHG
BH CV ECHO MEAS - PA MAX PG: 5.2 MMHG
BH CV ECHO MEAS - PA PR(ACCEL): 7.7 MMHG
BH CV ECHO MEAS - PA V2 MAX: 114.4 CM/SEC
BH CV ECHO MEAS - PULM A REVS DUR: 0.12 SEC
BH CV ECHO MEAS - PULM A REVS VEL: 61.1 CM/SEC
BH CV ECHO MEAS - PULM DIAS VEL: 45.6 CM/SEC
BH CV ECHO MEAS - PULM S/D: 1.3
BH CV ECHO MEAS - PULM SYS VEL: 59.7 CM/SEC
BH CV ECHO MEAS - PVA(V,A): 2.4 CM^2
BH CV ECHO MEAS - PVA(V,D): 2.4 CM^2
BH CV ECHO MEAS - QP/QS: 0.59
BH CV ECHO MEAS - RAP SYSTOLE: 3 MMHG
BH CV ECHO MEAS - RV MAX PG: 3 MMHG
BH CV ECHO MEAS - RV MEAN PG: 1.7 MMHG
BH CV ECHO MEAS - RV V1 MAX: 86.8 CM/SEC
BH CV ECHO MEAS - RV V1 MEAN: 62.5 CM/SEC
BH CV ECHO MEAS - RV V1 VTI: 19.4 CM
BH CV ECHO MEAS - RVOT AREA: 3.1 CM^2
BH CV ECHO MEAS - RVOT DIAM: 2 CM
BH CV ECHO MEAS - SI(AO): 104.8 ML/M^2
BH CV ECHO MEAS - SI(CUBED): 42 ML/M^2
BH CV ECHO MEAS - SI(LVOT): 53.8 ML/M^2
BH CV ECHO MEAS - SI(MOD-SP2): 21.2 ML/M^2
BH CV ECHO MEAS - SI(MOD-SP4): 16.9 ML/M^2
BH CV ECHO MEAS - SI(TEICH): 36.8 ML/M^2
BH CV ECHO MEAS - SUP REN AO DIAM: 1.9 CM
BH CV ECHO MEAS - SV(AO): 197.9 ML
BH CV ECHO MEAS - SV(CUBED): 79.3 ML
BH CV ECHO MEAS - SV(LVOT): 101.6 ML
BH CV ECHO MEAS - SV(MOD-SP2): 40 ML
BH CV ECHO MEAS - SV(MOD-SP4): 32 ML
BH CV ECHO MEAS - SV(RVOT): 60.4 ML
BH CV ECHO MEAS - SV(TEICH): 69.5 ML
BH CV ECHO MEAS - TAPSE (>1.6): 2.2 CM2
BH CV NUCLEAR PRIOR STUDY: 2
BH CV STRESS BP STAGE 1: NORMAL
BH CV STRESS COMMENTS STAGE 1: NORMAL
BH CV STRESS DOSE REGADENOSON STAGE 1: 0.4
BH CV STRESS DURATION MIN STAGE 1: 0
BH CV STRESS DURATION SEC STAGE 1: 10
BH CV STRESS HR STAGE 1: 146
BH CV STRESS PROTOCOL 1: NORMAL
BH CV STRESS RECOVERY BP: NORMAL MMHG
BH CV STRESS RECOVERY HR: 93 BPM
BH CV STRESS STAGE 1: 1
BH CV XLRA - RV BASE: 2.8 CM
BH CV XLRA - TDI S': 11 CM/SEC
BILIRUB SERPL-MCNC: 0.3 MG/DL (ref 0.1–1.2)
BUN BLD-MCNC: 19 MG/DL (ref 6–20)
BUN/CREAT SERPL: 26.8 (ref 7–25)
CALCIUM SPEC-SCNC: 9.3 MG/DL (ref 8.6–10.5)
CHLORIDE SERPL-SCNC: 100 MMOL/L (ref 98–107)
CO2 SERPL-SCNC: 25.8 MMOL/L (ref 22–29)
CREAT BLD-MCNC: 0.71 MG/DL (ref 0.57–1)
D DIMER PPP FEU-MCNC: 0.31 MCGFEU/ML (ref 0–0.49)
DEPRECATED RDW RBC AUTO: 40.4 FL (ref 37–54)
E/E' RATIO: 6.5
EOSINOPHIL # BLD AUTO: 0.15 10*3/MM3 (ref 0–0.7)
EOSINOPHIL NFR BLD AUTO: 1.5 % (ref 0.3–6.2)
ERYTHROCYTE [DISTWIDTH] IN BLOOD BY AUTOMATED COUNT: 13.5 % (ref 11.7–13)
GFR SERPL CREATININE-BSD FRML MDRD: 85 ML/MIN/1.73
GLOBULIN UR ELPH-MCNC: 2.6 GM/DL
GLUCOSE BLD-MCNC: 97 MG/DL (ref 65–99)
HCT VFR BLD AUTO: 42.4 % (ref 35.6–45.5)
HGB BLD-MCNC: 13.9 G/DL (ref 11.9–15.5)
IMM GRANULOCYTES # BLD: 0.02 10*3/MM3 (ref 0–0.03)
IMM GRANULOCYTES NFR BLD: 0.2 % (ref 0–0.5)
LEFT ATRIUM VOLUME INDEX: 16 ML/M2
LV EF NUC BP: 79 %
LYMPHOCYTES # BLD AUTO: 3.34 10*3/MM3 (ref 0.9–4.8)
LYMPHOCYTES NFR BLD AUTO: 33.2 % (ref 19.6–45.3)
MAXIMAL PREDICTED HEART RATE: 163 BPM
MAXIMAL PREDICTED HEART RATE: 163 BPM
MCH RBC QN AUTO: 26.8 PG (ref 26.9–32)
MCHC RBC AUTO-ENTMCNC: 32.8 G/DL (ref 32.4–36.3)
MCV RBC AUTO: 81.9 FL (ref 80.5–98.2)
MONOCYTES # BLD AUTO: 0.58 10*3/MM3 (ref 0.2–1.2)
MONOCYTES NFR BLD AUTO: 5.8 % (ref 5–12)
NEUTROPHILS # BLD AUTO: 5.93 10*3/MM3 (ref 1.9–8.1)
NEUTROPHILS NFR BLD AUTO: 58.9 % (ref 42.7–76)
NRBC BLD MANUAL-RTO: 0 /100 WBC (ref 0–0)
NT-PROBNP SERPL-MCNC: 19.2 PG/ML (ref 0–900)
PERCENT MAX PREDICTED HR: 89.57 %
PLATELET # BLD AUTO: 361 10*3/MM3 (ref 140–500)
PMV BLD AUTO: 9.5 FL (ref 6–12)
POTASSIUM BLD-SCNC: 3.9 MMOL/L (ref 3.5–5.2)
PROT SERPL-MCNC: 6.9 G/DL (ref 6–8.5)
RBC # BLD AUTO: 5.18 10*6/MM3 (ref 3.9–5.2)
SINUS: 2.7 CM
SODIUM BLD-SCNC: 139 MMOL/L (ref 136–145)
STJ: 2.5 CM
STRESS BASELINE BP: NORMAL MMHG
STRESS BASELINE HR: 85 BPM
STRESS PERCENT HR: 105 %
STRESS POST EXERCISE DUR SEC: 10 SEC
STRESS POST PEAK BP: NORMAL MMHG
STRESS POST PEAK HR: 146 BPM
STRESS TARGET HR: 139 BPM
STRESS TARGET HR: 139 BPM
TROPONIN T SERPL-MCNC: <0.01 NG/ML (ref 0–0.03)
WBC NRBC COR # BLD: 10.06 10*3/MM3 (ref 4.5–10.7)

## 2018-03-01 PROCEDURE — 99213 OFFICE O/P EST LOW 20 MIN: CPT | Performed by: INTERNAL MEDICINE

## 2018-03-01 PROCEDURE — 83880 ASSAY OF NATRIURETIC PEPTIDE: CPT | Performed by: INTERNAL MEDICINE

## 2018-03-01 PROCEDURE — 25010000002 REGADENOSON 0.4 MG/5ML SOLUTION: Performed by: INTERNAL MEDICINE

## 2018-03-01 PROCEDURE — 93018 CV STRESS TEST I&R ONLY: CPT | Performed by: INTERNAL MEDICINE

## 2018-03-01 PROCEDURE — 80053 COMPREHEN METABOLIC PANEL: CPT | Performed by: INTERNAL MEDICINE

## 2018-03-01 PROCEDURE — 93017 CV STRESS TEST TRACING ONLY: CPT

## 2018-03-01 PROCEDURE — 93016 CV STRESS TEST SUPVJ ONLY: CPT | Performed by: INTERNAL MEDICINE

## 2018-03-01 PROCEDURE — 36415 COLL VENOUS BLD VENIPUNCTURE: CPT

## 2018-03-01 PROCEDURE — 93306 TTE W/DOPPLER COMPLETE: CPT | Performed by: INTERNAL MEDICINE

## 2018-03-01 PROCEDURE — 93000 ELECTROCARDIOGRAM COMPLETE: CPT | Performed by: INTERNAL MEDICINE

## 2018-03-01 PROCEDURE — 78452 HT MUSCLE IMAGE SPECT MULT: CPT

## 2018-03-01 PROCEDURE — 99244 OFF/OP CNSLTJ NEW/EST MOD 40: CPT | Performed by: INTERNAL MEDICINE

## 2018-03-01 PROCEDURE — 94760 N-INVAS EAR/PLS OXIMETRY 1: CPT

## 2018-03-01 PROCEDURE — 0 TECHNETIUM TETROFOSMIN KIT: Performed by: INTERNAL MEDICINE

## 2018-03-01 PROCEDURE — 85025 COMPLETE CBC W/AUTO DIFF WBC: CPT | Performed by: INTERNAL MEDICINE

## 2018-03-01 PROCEDURE — 93306 TTE W/DOPPLER COMPLETE: CPT

## 2018-03-01 PROCEDURE — A9502 TC99M TETROFOSMIN: HCPCS | Performed by: INTERNAL MEDICINE

## 2018-03-01 PROCEDURE — 84484 ASSAY OF TROPONIN QUANT: CPT | Performed by: INTERNAL MEDICINE

## 2018-03-01 PROCEDURE — 78452 HT MUSCLE IMAGE SPECT MULT: CPT | Performed by: INTERNAL MEDICINE

## 2018-03-01 PROCEDURE — 25010000002 AMINOPHYLLINE PER 250 MG: Performed by: INTERNAL MEDICINE

## 2018-03-01 PROCEDURE — 85379 FIBRIN DEGRADATION QUANT: CPT | Performed by: INTERNAL MEDICINE

## 2018-03-01 RX ORDER — SODIUM CHLORIDE 0.9 % (FLUSH) 0.9 %
10 SYRINGE (ML) INJECTION AS NEEDED
Status: DISCONTINUED | OUTPATIENT
Start: 2018-03-01 | End: 2019-01-11

## 2018-03-01 RX ORDER — AMINOPHYLLINE DIHYDRATE 25 MG/ML
125 INJECTION, SOLUTION INTRAVENOUS ONCE
Status: COMPLETED | OUTPATIENT
Start: 2018-03-01 | End: 2018-03-01

## 2018-03-01 RX ORDER — NITROGLYCERIN 0.4 MG/1
0.4 TABLET SUBLINGUAL
Status: DISCONTINUED | OUTPATIENT
Start: 2018-03-01 | End: 2019-01-11

## 2018-03-01 RX ADMIN — TETROFOSMIN 1 DOSE: 1.38 INJECTION, POWDER, LYOPHILIZED, FOR SOLUTION INTRAVENOUS at 11:35

## 2018-03-01 RX ADMIN — AMINOPHYLLINE 125 MG: 25 INJECTION, SOLUTION INTRAVENOUS at 13:05

## 2018-03-01 RX ADMIN — TETROFOSMIN 1 DOSE: 1.38 INJECTION, POWDER, LYOPHILIZED, FOR SOLUTION INTRAVENOUS at 13:00

## 2018-03-01 RX ADMIN — REGADENOSON 0.4 MG: 0.08 INJECTION, SOLUTION INTRAVENOUS at 13:00

## 2018-03-01 NOTE — ADDENDUM NOTE
Encounter addended by: Hilda Reyna MD on: 3/1/2018  2:56 PM<BR>     Actions taken: Sign clinical note

## 2018-03-01 NOTE — ADDENDUM NOTE
Encounter addended by: Hilda Reyna MD on: 3/1/2018 11:35 AM<BR>     Actions taken: LOS modified, Sign clinical note

## 2018-03-01 NOTE — PROGRESS NOTES
Procedure     ECG 12 Lead  Date/Time: 3/1/2018 9:31 AM  Performed by: FATOU SCHNEIDER  Authorized by: FATOU SCHNEIDER   Comparison: not compared with previous ECG   Previous ECG: no previous ECG available  Rhythm: sinus rhythm  Rate: normal  Conduction: conduction normal  QRS axis: normal  Clinical impression: abnormal ECG  Comments: Probable inf infarct

## 2018-03-01 NOTE — PROGRESS NOTES
"57 y.o.           Celecoxib; Methocarbamol; Montelukast; Penicillins; Carbomer; Cobalt; Gemifloxacin; and Nickel    Ramon Vázquez MD    Chief Complaint   Patient presents with   • Chest Pain     States chest pressure x 1 week.  Feels like \"a brick is on my chest\".  Denies , sob, dizziness. Last time she had this was 18 years ago.  Lots of heartburn lately       Past Medical History:   Diagnosis Date   • Chronic hepatitis B    • Diabetes    • H/O colonoscopy 2012   • Hepatitis C virus    • Thyroid disease        Past Surgical History:   Procedure Laterality Date   • APPENDECTOMY     • BREAST BIOPSY     •  SECTION     • CHOLECYSTECTOMY     • DILATATION AND CURETTAGE     • HYSTERECTOMY     • KNEE SURGERY Left    • THYROIDECTOMY  2016       Social History     Social History   • Marital status:      Social History Main Topics   • Smoking status: Never Smoker   • Smokeless tobacco: Never Used   • Alcohol use Yes      Comment: SOCIAL DRINKER    • Drug use: Defer   • Sexual activity: Defer       Family History   Problem Relation Age of Onset   • COPD Mother    • Hyperlipidemia Mother    • Hypertension Mother    • Melanoma Mother    • Heart failure Mother    • Transient ischemic attack Mother    • Colon cancer Father        There were no vitals filed for this visit.      Current Outpatient Prescriptions:   •  citalopram (CeleXA) 20 MG tablet, TAKE 1 TABLET BY MOUTH DAILY., Disp: 90 tablet, Rfl: 1  •  fenofibrate 160 MG tablet, TAKE 1 TABLET BY MOUTH EVERY DAY, Disp: 90 tablet, Rfl: 0  •  glipiZIDE (GLUCOTROL) 10 MG tablet, Take 1 tablet (10 mg total) by mouth 2 (two) times a day., Disp: 180 tablet, Rfl: 2  •  glucose blood test strip, OneTouch Ultra Blue In Vitro Strip; Patient Sig: OneTouch Ultra Blue In Vitro Strip ; 100; 0; 2013; Active, Disp: , Rfl:   •  INVOKANA 300 MG tablet, TAKE 1 TABLET DAILY, Disp: , Rfl: 1  •  linagliptin (TRADJENTA) 5 MG tablet tablet, Take 5 mg by " mouth., Disp: , Rfl:   •  metFORMIN ER (GLUCOPHAGE-XR) 500 MG 24 hr tablet, Take 1,000 mg by mouth., Disp: , Rfl:   •  ondansetron ODT (ZOFRAN ODT) 8 MG disintegrating tablet, Take 1 tablet by mouth Every 8 (Eight) Hours As Needed for Nausea or Vomiting., Disp: 10 tablet, Rfl: 0  •  rosuvastatin (CRESTOR) 20 MG tablet, Take 1 tablet by mouth Daily., Disp: 90 tablet, Rfl: 3  •  sitaGLIPtin-metFORMIN (JANUMET)  MG per tablet, Take 1 tablet by mouth 2 (Two) Times a Day With Meals., Disp: , Rfl:   •  SYNTHROID 125 MCG tablet, , Disp: , Rfl:   •  triamcinolone (KENALOG) 0.1 % ointment, As Needed., Disp: , Rfl:   •  valsartan-hydrochlorothiazide (DIOVAN-HCT) 80-12.5 MG per tablet, TAKE 1 TABLET BY MOUTH DAILY., Disp: 90 tablet, Rfl: 1  •  vitamin D (ERGOCALCIFEROL) 57273 UNITS capsule capsule, , Disp: , Rfl:     Current Facility-Administered Medications:   •  nitroglycerin (NITROSTAT) SL tablet 0.4 mg, 0.4 mg, Sublingual, Q5 Min PRN, Hilda Reyna MD  •  sodium chloride 0.9 % flush 10 mL, 10 mL, Intravenous, PRN, Hilda Reyna MD

## 2018-03-01 NOTE — PROGRESS NOTES
Date of Office Visit: 2018  Encounter Provider: Hilda Reyna MD  Place of Service: [facility] CARDIOLOGY  Patient Name: Lynda Rueda  :1960      Patient ID:  Lynda Rueda is a 57 y.o. female is here for chest heaviness and fatigue.           History of Present Illness    She was referred by Dr. Chance Vázquez for chest pressure.    Mrs Marybeth boss is a 57-year-old female who [facility].  She's had 1 week of progressive chest heaviness brought on with activity and relieved with rest.  She's had progressive fatigue.  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  Normally she goes up and down stairs without tell.  Now she has to stop [employer] on the stairs because she gets chest heaviness and she feels so fatigued.  She's been sleeping at home from 6 PM until 5 in the morning.  This is unusual for her.  She doesn't feel her heart racing or skipping.  She also has been noticing diaphoresis with activity which is also unusual for her.    She has diabetes mellitus type 2.  She is sedentary and obese.  She has hypertension and hyperlipidemia.  She's never had myocardial infarction, TIA, stroke, heart failure.  She does not smoke and has very rare alcohol.  There is no family history of premature cardiovascular disease.  She has no COPD or asthma.  There is no history of cancer or blood clots for her.  She has no renal disease or liver disease.    She's never had anything like this before.    Her ECG done at her primary care physician's office is normal.    Past Medical History:   Diagnosis Date   • Chronic hepatitis B    • Diabetes    • H/O colonoscopy 2012   • Hepatitis C virus    • Thyroid disease          Past Surgical History:   Procedure Laterality Date   • APPENDECTOMY     • BREAST BIOPSY     •  SECTION     • CHOLECYSTECTOMY     • DILATATION AND CURETTAGE     • HYSTERECTOMY     • KNEE SURGERY  Left 2013   • THYROIDECTOMY  07/2016       Current Outpatient Prescriptions on File Prior to Encounter   Medication Sig Dispense Refill   • citalopram (CeleXA) 20 MG tablet TAKE 1 TABLET BY MOUTH DAILY. 90 tablet 1   • fenofibrate 160 MG tablet TAKE 1 TABLET BY MOUTH EVERY DAY 90 tablet 0   • glipiZIDE (GLUCOTROL) 10 MG tablet Take 1 tablet (10 mg total) by mouth 2 (two) times a day. 180 tablet 2   • glucose blood test strip OneTouch Ultra Blue In Vitro Strip; Patient Sig: OneTouch Ultra Blue In Vitro Strip ; 100; 0; 11-Nov-2013; Active     • INVOKANA 300 MG tablet TAKE 1 TABLET DAILY  1   • linagliptin (TRADJENTA) 5 MG tablet tablet Take 5 mg by mouth.     • metFORMIN ER (GLUCOPHAGE-XR) 500 MG 24 hr tablet Take 1,000 mg by mouth.     • rosuvastatin (CRESTOR) 20 MG tablet Take 1 tablet by mouth Daily. 90 tablet 3   • SYNTHROID 125 MCG tablet      • valsartan-hydrochlorothiazide (DIOVAN-HCT) 80-12.5 MG per tablet TAKE 1 TABLET BY MOUTH DAILY. 90 tablet 1   • vitamin D (ERGOCALCIFEROL) 47385 UNITS capsule capsule      • ondansetron ODT (ZOFRAN ODT) 8 MG disintegrating tablet Take 1 tablet by mouth Every 8 (Eight) Hours As Needed for Nausea or Vomiting. 10 tablet 0   • sitaGLIPtin-metFORMIN (JANUMET)  MG per tablet Take 1 tablet by mouth 2 (Two) Times a Day With Meals.     • triamcinolone (KENALOG) 0.1 % ointment As Needed.     • [DISCONTINUED] azithromycin (ZITHROMAX Z-AMINTA) 250 MG tablet Take 2 tablets the first day, then 1 tablet daily for 4 days. 6 tablet 0   • [DISCONTINUED] cefdinir (OMNICEF) 300 MG capsule 2 capsules (at the same time) once a day 20 capsule 0   • [DISCONTINUED] Chlorcyclizine-Pseudoephed (STAHIST AD) 25-60 MG tablet One tablet three times a day as needed for congestion 30 tablet 0   • [DISCONTINUED] Dapagliflozin Propanediol (FARXIGA) 10 MG tablet Take 1 tablet/day by mouth Daily. 1 tablet daily 90 tablet 3   • [DISCONTINUED] estradiol (MINIVELLE, VIVELLE-DOT) 0.05 MG/24HR patch Place 1  patch on the skin 2 (two) times a week. AS DIRECTED     • [DISCONTINUED] MethylPREDNISolone (MEDROL, AMINTA,) 4 MG tablet Take as directed on package instructions. 21 tablet 0   • [DISCONTINUED] nitrofurantoin, macrocrystal-monohydrate, (MACROBID) 100 MG capsule Take 1 capsule by mouth 2 (Two) Times a Day. 14 capsule 0   • [DISCONTINUED] ONETOUCH DELICA LANCETS 33G misc      • [DISCONTINUED] oseltamivir (TAMIFLU) 75 MG capsule 1 capsule PO BID 10 capsule 0     No current facility-administered medications on file prior to encounter.        Social History     Social History   • Marital status:      Spouse name: N/A   • Number of children: N/A   • Years of education: N/A     Occupational History   • Not on file.     Social History Main Topics   • Smoking status: Never Smoker   • Smokeless tobacco: Never Used   • Alcohol use Yes      Comment: SOCIAL DRINKER    • Drug use: Defer   • Sexual activity: Defer     Other Topics Concern   • Not on file     Social History Narrative           Review of Systems   Constitution: Positive for malaise/fatigue.   HENT: Negative for congestion.    Eyes: Negative for vision loss in left eye and vision loss in right eye.   Respiratory: Positive for shortness of breath. Negative for cough, hemoptysis, sleep disturbances due to breathing, snoring, sputum production and wheezing.    Endocrine: Negative.    Hematologic/Lymphatic: Negative.    Skin: Negative for poor wound healing and rash.   Musculoskeletal: Negative for falls, gout, muscle cramps and myalgias.   Gastrointestinal: Negative for abdominal pain, diarrhea, dysphagia, hematemesis, melena, nausea and vomiting.   Neurological: Negative for excessive daytime sleepiness, dizziness, headaches, light-headedness, loss of balance, seizures and vertigo.   Psychiatric/Behavioral: Negative for depression and substance abuse. The patient is not nervous/anxious.        Procedures  Procedures       Objective:      Vitals:    03/01/18 1027  "03/01/18 1029   BP: 97/57 94/58   Pulse: 78    SpO2: 96%    Weight:  90.7 kg (200 lb)   Height:  154.9 cm (61\")     Body mass index is 37.79 kg/(m^2).    Physical Exam   Constitutional: She is oriented to person, place, and time. She appears well-developed and well-nourished. No distress.   HENT:   Head: Normocephalic and atraumatic.   Eyes: Conjunctivae are normal. No scleral icterus.   Neck: Neck supple. No JVD present. Carotid bruit is not present. No thyromegaly present.   Cardiovascular: Normal rate, regular rhythm, S1 normal, S2 normal and intact distal pulses.   No extrasystoles are present. PMI is not displaced.  Exam reveals no gallop.    Murmur heard.   Harsh midsystolic murmur is present with a grade of 3/6  at the upper right sternal border, upper left sternal border  Pulses:       Carotid pulses are 2+ on the right side with bruit, and 2+ on the left side with bruit.       Radial pulses are 2+ on the right side, and 2+ on the left side.        Dorsalis pedis pulses are 2+ on the right side, and 2+ on the left side.        Posterior tibial pulses are 2+ on the right side, and 2+ on the left side.   Pulmonary/Chest: Effort normal and breath sounds normal. No respiratory distress. She has no wheezes. She has no rhonchi. She has no rales. She exhibits no tenderness.   Abdominal: Soft. Bowel sounds are normal. She exhibits no distension, no abdominal bruit and no mass. There is no tenderness.   Musculoskeletal: She exhibits no edema or deformity.   Lymphadenopathy:     She has no cervical adenopathy.   Neurological: She is alert and oriented to person, place, and time. No cranial nerve deficit.   Skin: Skin is warm and dry. No rash noted. She is not diaphoretic. No cyanosis. No pallor. Nails show no clubbing.   Psychiatric: She has a normal mood and affect. Judgment normal.   Vitals reviewed.      Lab Review:       Assessment:      Diagnosis Plan   1. Chest pain, unspecified type  Cardiac Monitoring    Vital " Signs - Once    Vital Signs - As Needed    Pulse Oximetry    Oxygen Therapy- Nasal Cannula; Titrate for SPO2: 92%, equal to or greater than    Insert Peripheral IV    sodium chloride 0.9 % flush 10 mL    nitroglycerin (NITROSTAT) SL tablet 0.4 mg    NPO Diet    Bathroom Privileges With Assistance    CBC & Differential    Comprehensive Metabolic Panel    Troponin T    D-dimer    proBNP    Cardiac Monitoring    Vital Signs - Once    Insert Peripheral IV    NPO Diet    Bathroom Privileges With Assistance    Troponin T    Troponin T    D-dimer    D-dimer    proBNP    proBNP    CBC Auto Differential   2. Murmur  Adult Transthoracic Echo Complete W/ Cont if Necessary Per Protocol   3. Essential hypertension     4. Mixed hyperlipidemia     5. Type 2 diabetes mellitus without complication, without long-term current use of insulin     6. Precordial pain  Stress Test With Myocardial Perfusion One Day     1.   chest pain.  Set up stress study to be done today.  1. Murmur on examination, set up echocardiogram  2. Hypertension, well controlled  3. Diabetes mellitus, type II.  Her last hemoglobin A1c was 8 done within the last 3 months.  4. Hyperlipidemia, on medication for this  5. Obesity and sedentary lifestyle.       Plan:       See plan above    Addendum: Her troponin, CMP, CBC and d-dimer are unremarkable.  Her stress nuclear perfusion study and 2-D echocardiogram were normal.  There is no evidence of ischemia or cardiomyopathy.

## 2018-03-08 ENCOUNTER — TELEPHONE (OUTPATIENT)
Dept: CARDIOLOGY | Facility: HOSPITAL | Age: 58
End: 2018-03-08

## 2018-03-19 RX ORDER — FENOFIBRATE 160 MG/1
TABLET ORAL
Qty: 90 TABLET | Refills: 0 | Status: SHIPPED | OUTPATIENT
Start: 2018-03-19 | End: 2018-06-16 | Stop reason: SDUPTHER

## 2018-03-25 NOTE — PROGRESS NOTES
Subjective   Lynda Rueda is a 57 y.o. female.   She is here today for chest pressure and we will follow-up for physical later time  History of Present Illness   She is here today for chest pressure which comes and goes and not related to activity or rest and going on for about a week now  The following portions of the patient's history were reviewed and updated as appropriate: allergies, current medications, past family history, past medical history, past social history, past surgical history and problem list.    Review of Systems   Cardiovascular: Positive for chest pain.   All other systems reviewed and are negative.      Objective   Physical Exam   Constitutional: She is oriented to person, place, and time. She appears well-developed and well-nourished. She is cooperative.   HENT:   Head: Normocephalic and atraumatic.   Right Ear: Hearing, tympanic membrane, external ear and ear canal normal.   Left Ear: Hearing, tympanic membrane, external ear and ear canal normal.   Nose: Nose normal.   Mouth/Throat: Uvula is midline, oropharynx is clear and moist and mucous membranes are normal.   Eyes: Conjunctivae, EOM and lids are normal. Pupils are equal, round, and reactive to light.   Neck: Phonation normal. Neck supple. Carotid bruit is not present.   Cardiovascular: Normal rate, regular rhythm and normal heart sounds.  Exam reveals no gallop and no friction rub.    No murmur heard.  Pulmonary/Chest: Effort normal and breath sounds normal. No respiratory distress.   Abdominal: Soft. Bowel sounds are normal. She exhibits no distension and no mass. There is no hepatosplenomegaly. There is no tenderness. There is no rebound and no guarding. No hernia.   Musculoskeletal: She exhibits no edema.   Neurological: She is alert and oriented to person, place, and time. Coordination and gait normal.   Skin: Skin is warm and dry.   Psychiatric: She has a normal mood and affect. Her speech is normal and behavior is normal.  Judgment and thought content normal.   Nursing note and vitals reviewed.      Assessment/Plan   Diagnoses and all orders for this visit:    Chest pressure  -     ECG 12 Lead    EKG, abnormal  -     ECG 12 Lead      Chest pressure EKG twelve-lead is stable and we will follow from there and she will see cardiology as well  Abnormal EKG well EKG is abnormal mildly it is still stable and she will follow-up with cardiology or we will send her to the chest pain center  In this case we have sent her to the chest pain center for further workup immediately since she is diabetic with physical exam later time

## 2018-04-05 ENCOUNTER — APPOINTMENT (OUTPATIENT)
Dept: WOMENS IMAGING | Facility: HOSPITAL | Age: 58
End: 2018-04-05

## 2018-04-05 ENCOUNTER — OFFICE VISIT (OUTPATIENT)
Dept: INTERNAL MEDICINE | Facility: CLINIC | Age: 58
End: 2018-04-05

## 2018-04-05 VITALS
HEIGHT: 61 IN | DIASTOLIC BLOOD PRESSURE: 65 MMHG | RESPIRATION RATE: 16 BRPM | SYSTOLIC BLOOD PRESSURE: 108 MMHG | WEIGHT: 203 LBS | BODY MASS INDEX: 38.33 KG/M2 | HEART RATE: 102 BPM | TEMPERATURE: 97.4 F | OXYGEN SATURATION: 96 %

## 2018-04-05 DIAGNOSIS — K21.00 GASTROESOPHAGEAL REFLUX DISEASE WITH ESOPHAGITIS: Primary | ICD-10-CM

## 2018-04-05 DIAGNOSIS — Z79.899 CONTROLLED SUBSTANCE AGREEMENT SIGNED: ICD-10-CM

## 2018-04-05 DIAGNOSIS — E11.9 TYPE 2 DIABETES MELLITUS NOT AT GOAL (HCC): ICD-10-CM

## 2018-04-05 DIAGNOSIS — E66.01 MORBID OBESITY (HCC): ICD-10-CM

## 2018-04-05 PROBLEM — IMO0002 TYPE 2 DIABETES MELLITUS, UNCONTROLLED: Status: ACTIVE | Noted: 2017-01-21

## 2018-04-05 PROCEDURE — 77067 SCR MAMMO BI INCL CAD: CPT | Performed by: RADIOLOGY

## 2018-04-05 PROCEDURE — 77063 BREAST TOMOSYNTHESIS BI: CPT | Performed by: RADIOLOGY

## 2018-04-05 PROCEDURE — 99214 OFFICE O/P EST MOD 30 MIN: CPT | Performed by: INTERNAL MEDICINE

## 2018-04-05 RX ORDER — VALSARTAN 40 MG/1
40 TABLET ORAL
Qty: 90 TABLET | Refills: 3 | Status: SHIPPED | OUTPATIENT
Start: 2018-04-05 | End: 2018-07-05

## 2018-04-05 RX ORDER — PANTOPRAZOLE SODIUM 40 MG/1
40 TABLET, DELAYED RELEASE ORAL DAILY
Qty: 90 TABLET | Refills: 1 | Status: SHIPPED | OUTPATIENT
Start: 2018-04-05 | End: 2021-03-10

## 2018-04-05 RX ORDER — PHENTERMINE HYDROCHLORIDE 30 MG/1
30 CAPSULE ORAL EVERY MORNING
Qty: 30 CAPSULE | Refills: 0 | Status: SHIPPED | OUTPATIENT
Start: 2018-04-05 | End: 2018-07-05 | Stop reason: SDUPTHER

## 2018-04-05 RX ORDER — SUCRALFATE 1 G/1
1 TABLET ORAL 4 TIMES DAILY
Qty: 60 TABLET | Refills: 2 | Status: SHIPPED | OUTPATIENT
Start: 2018-04-05 | End: 2019-01-11

## 2018-04-12 ENCOUNTER — TELEPHONE (OUTPATIENT)
Dept: INTERNAL MEDICINE | Facility: CLINIC | Age: 58
End: 2018-04-12

## 2018-04-19 NOTE — PROGRESS NOTES
Subjective   Lynda Rueda is a 57 y.o. female.   She is here today for GERD with esophagitis which is getting worse not better along with morbid obesity which is getting worse not better and control substance agreement signed today for phentermine and type 2 diabetes not at goal which is getting worse not better  History of Present Illness   She is here to follow for GERD with esophagitis which is getting worse along with morbid obesity which is getting worse control substance agreement signed today for phentermine for weight loss and type 2 diabetes which is getting worse as well  The following portions of the patient's history were reviewed and updated as appropriate: allergies, current medications, past family history, past medical history, past social history, past surgical history and problem list.    Review of Systems   All other systems reviewed and are negative.      Objective   Physical Exam   Constitutional: She is oriented to person, place, and time. She appears well-developed and well-nourished. She is cooperative.   HENT:   Head: Normocephalic and atraumatic.   Right Ear: Hearing, tympanic membrane, external ear and ear canal normal.   Left Ear: Hearing, tympanic membrane, external ear and ear canal normal.   Nose: Nose normal.   Mouth/Throat: Uvula is midline, oropharynx is clear and moist and mucous membranes are normal.   Eyes: Conjunctivae, EOM and lids are normal. Pupils are equal, round, and reactive to light.   Neck: Phonation normal. Neck supple. Carotid bruit is not present.   Cardiovascular: Normal rate, regular rhythm and normal heart sounds.  Exam reveals no gallop and no friction rub.    No murmur heard.  Pulmonary/Chest: Effort normal and breath sounds normal. No respiratory distress.   Abdominal: Soft. Bowel sounds are normal. She exhibits no distension and no mass. There is no hepatosplenomegaly. There is no tenderness. There is no rebound and no guarding. No hernia.    Musculoskeletal: She exhibits no edema.   Neurological: She is alert and oriented to person, place, and time. Coordination and gait normal.   Skin: Skin is warm and dry.   Psychiatric: She has a normal mood and affect. Her speech is normal and behavior is normal. Judgment and thought content normal.   Nursing note and vitals reviewed.      Assessment/Plan   Diagnoses and all orders for this visit:    Gastroesophageal reflux disease with esophagitis    Morbid obesity    Controlled substance agreement signed    Type 2 diabetes mellitus not at goal    Other orders  -     pantoprazole (PROTONIX) 40 MG EC tablet; Take 1 tablet by mouth Daily.  -     sucralfate (CARAFATE) 1 g tablet; Take 1 tablet by mouth 4 (Four) Times a Day.  -     valsartan (DIOVAN) 40 MG tablet; Take 1 tablet by mouth every night at bedtime.  -     phentermine 30 MG capsule; Take 1 capsule by mouth Every Morning.      GERD with esophagitis we will provide Protonix and Carafate  Morbid obesity weight loss with proper diet exercise and medication in this case phentermine  Controlled substance cream and signed today for phentermine  Type 2 diabetes not well-controlled and we will follow hemoglobin A1 C along with endocrinology and hopefully with weight loss her diabetes will improve

## 2018-06-07 RX ORDER — VALSARTAN AND HYDROCHLOROTHIAZIDE 80; 12.5 MG/1; MG/1
TABLET, FILM COATED ORAL
Qty: 90 TABLET | Refills: 1 | Status: SHIPPED | OUTPATIENT
Start: 2018-06-07 | End: 2018-07-05

## 2018-06-07 RX ORDER — CITALOPRAM 20 MG/1
TABLET ORAL
Qty: 90 TABLET | Refills: 1 | Status: SHIPPED | OUTPATIENT
Start: 2018-06-07 | End: 2018-12-01 | Stop reason: SDUPTHER

## 2018-06-15 ENCOUNTER — ON CAMPUS - OUTPATIENT (OUTPATIENT)
Dept: URBAN - METROPOLITAN AREA HOSPITAL 114 | Facility: HOSPITAL | Age: 58
End: 2018-06-15
Payer: COMMERCIAL

## 2018-06-15 ENCOUNTER — ANESTHESIA (OUTPATIENT)
Dept: GASTROENTEROLOGY | Facility: HOSPITAL | Age: 58
End: 2018-06-15

## 2018-06-15 ENCOUNTER — HOSPITAL ENCOUNTER (OUTPATIENT)
Facility: HOSPITAL | Age: 58
Setting detail: HOSPITAL OUTPATIENT SURGERY
Discharge: HOME | End: 2018-06-15
Attending: INTERNAL MEDICINE | Admitting: INTERNAL MEDICINE

## 2018-06-15 ENCOUNTER — ANESTHESIA EVENT (OUTPATIENT)
Dept: GASTROENTEROLOGY | Facility: HOSPITAL | Age: 58
End: 2018-06-15

## 2018-06-15 VITALS
BODY MASS INDEX: 35.7 KG/M2 | OXYGEN SATURATION: 99 % | RESPIRATION RATE: 16 BRPM | TEMPERATURE: 97.9 F | SYSTOLIC BLOOD PRESSURE: 105 MMHG | HEART RATE: 65 BPM | DIASTOLIC BLOOD PRESSURE: 68 MMHG | HEIGHT: 62 IN | WEIGHT: 194 LBS

## 2018-06-15 DIAGNOSIS — Z80.0 FAMILY HISTORY OF COLON CANCER: ICD-10-CM

## 2018-06-15 DIAGNOSIS — Z80.0 FAMILY HISTORY OF MALIGNANT NEOPLASM OF DIGESTIVE ORGANS: ICD-10-CM

## 2018-06-15 DIAGNOSIS — Z12.11 ENCOUNTER FOR SCREENING FOR MALIGNANT NEOPLASM OF COLON: ICD-10-CM

## 2018-06-15 DIAGNOSIS — D12.4 BENIGN NEOPLASM OF DESCENDING COLON: ICD-10-CM

## 2018-06-15 DIAGNOSIS — K52.9 NONINFECTIVE GASTROENTERITIS AND COLITIS, UNSPECIFIED: ICD-10-CM

## 2018-06-15 LAB — GLUCOSE BLDC GLUCOMTR-MCNC: 142 MG/DL (ref 70–130)

## 2018-06-15 PROCEDURE — 88305 TISSUE EXAM BY PATHOLOGIST: CPT | Performed by: INTERNAL MEDICINE

## 2018-06-15 PROCEDURE — 82962 GLUCOSE BLOOD TEST: CPT

## 2018-06-15 PROCEDURE — 25010000002 PROPOFOL 10 MG/ML EMULSION: Performed by: NURSE ANESTHETIST, CERTIFIED REGISTERED

## 2018-06-15 PROCEDURE — 45380 COLONOSCOPY AND BIOPSY: CPT | Mod: 33 | Performed by: INTERNAL MEDICINE

## 2018-06-15 RX ORDER — PROPOFOL 10 MG/ML
VIAL (ML) INTRAVENOUS CONTINUOUS PRN
Status: DISCONTINUED | OUTPATIENT
Start: 2018-06-15 | End: 2018-06-15 | Stop reason: SURG

## 2018-06-15 RX ORDER — PROPOFOL 10 MG/ML
VIAL (ML) INTRAVENOUS AS NEEDED
Status: DISCONTINUED | OUTPATIENT
Start: 2018-06-15 | End: 2018-06-15 | Stop reason: SURG

## 2018-06-15 RX ORDER — SODIUM CHLORIDE, SODIUM LACTATE, POTASSIUM CHLORIDE, CALCIUM CHLORIDE 600; 310; 30; 20 MG/100ML; MG/100ML; MG/100ML; MG/100ML
30 INJECTION, SOLUTION INTRAVENOUS CONTINUOUS PRN
Status: DISCONTINUED | OUTPATIENT
Start: 2018-06-15 | End: 2018-06-15 | Stop reason: HOSPADM

## 2018-06-15 RX ORDER — LIDOCAINE HYDROCHLORIDE 20 MG/ML
INJECTION, SOLUTION INFILTRATION; PERINEURAL AS NEEDED
Status: DISCONTINUED | OUTPATIENT
Start: 2018-06-15 | End: 2018-06-15 | Stop reason: SURG

## 2018-06-15 RX ADMIN — PROPOFOL 200 MCG/KG/MIN: 10 INJECTION, EMULSION INTRAVENOUS at 08:45

## 2018-06-15 RX ADMIN — LIDOCAINE HYDROCHLORIDE 30 MG: 20 INJECTION, SOLUTION INFILTRATION; PERINEURAL at 08:45

## 2018-06-15 RX ADMIN — PROPOFOL 100 MG: 10 INJECTION, EMULSION INTRAVENOUS at 08:45

## 2018-06-15 RX ADMIN — SODIUM CHLORIDE, POTASSIUM CHLORIDE, SODIUM LACTATE AND CALCIUM CHLORIDE 30 ML/HR: 600; 310; 30; 20 INJECTION, SOLUTION INTRAVENOUS at 08:01

## 2018-06-15 NOTE — ANESTHESIA POSTPROCEDURE EVALUATION
Patient: Lynda Rueda    Procedure Summary     Date:  06/15/18 Room / Location:   [facility]    Anesthesia Start:  0841 Anesthesia Stop:  0916    Procedure:  COLONOSCOPY INTO CECUM AND T.I. WITH BIOPSIES AND COLD BIOPSY POLYPECTOMY (N/A ) Diagnosis:      Surgeon:  Sunil Cardona MD Provider:  Jim Troncoso MD    Anesthesia Type:  MAC ASA Status:  2          Anesthesia Type: MAC  Last vitals  BP   105/68 (06/15/18 0934)   Temp   36.6 °C (97.9 °F) (06/15/18 0924)   Pulse   65 (06/15/18 0934)   Resp   16 (06/15/18 0934)     SpO2   99 % (06/15/18 0934)     Post Anesthesia Care and Evaluation    Patient location during evaluation: bedside  Patient participation: complete - patient participated  Level of consciousness: awake and alert  Pain score: 0  Pain management: adequate  Airway patency: patent  Anesthetic complications: No anesthetic complications  PONV Status: none  Cardiovascular status: acceptable  Respiratory status: acceptable  Hydration status: acceptable

## 2018-06-15 NOTE — H&P
Patient Care Team:  Ramon Vázquez MD as PCP - General  Ramon Vázquez MD as PCP - Family Medicine  Merline Soria MD as Consulting Physician (Obstetrics and Gynecology)    Chief complaint  Family history of colon cancer     Subjective     History of Present Illness  Also bowel movements are less frequent  Review of Systems   All other systems reviewed and are negative.       Past Medical History:   Diagnosis Date   • Diabetes    • GERD (gastroesophageal reflux disease)    • H/O colonoscopy 2012   • Hypertension    • PONV (postoperative nausea and vomiting)    • Thyroid disease      Past Surgical History:   Procedure Laterality Date   • APPENDECTOMY     • BREAST BIOPSY     •  SECTION     • CHOLECYSTECTOMY     • DILATATION AND CURETTAGE     • HYSTERECTOMY      OVARIAN MASS/SMALL INTESTINE   • KNEE SURGERY Left    • THYROIDECTOMY  2016     Family History   Problem Relation Age of Onset   • COPD Mother    • Hyperlipidemia Mother    • Hypertension Mother    • Melanoma Mother    • Heart failure Mother    • Transient ischemic attack Mother    • Colon cancer Father      Social History   Substance Use Topics   • Smoking status: Never Smoker   • Smokeless tobacco: Never Used   • Alcohol use Yes      Comment: SOCIAL DRINKER      Facility-Administered Medications Prior to Admission   Medication Dose Route Frequency Provider Last Rate Last Dose   • nitroglycerin (NITROSTAT) SL tablet 0.4 mg  0.4 mg Sublingual Q5 Min PRN Hilda Reyna MD       • sodium chloride 0.9 % flush 10 mL  10 mL Intravenous PRN Hilda Reyna MD         Prescriptions Prior to Admission   Medication Sig Dispense Refill Last Dose   • citalopram (CeleXA) 20 MG tablet TAKE 1 TABLET BY MOUTH DAILY. 90 tablet 1 2018 at Unknown time   • fenofibrate 160 MG tablet TAKE 1 TABLET BY MOUTH EVERY DAY 90 tablet 0 2018 at Unknown time   • glipiZIDE (GLUCOTROL) 10 MG tablet Take 1 tablet (10 mg total) by  mouth 2 (two) times a day. 180 tablet 2 6/14/2018 at Unknown time   • glucose blood test strip OneTouch Ultra Blue In Vitro Strip; Patient Sig: OneTouch Ultra Blue In Vitro Strip ; 100; 0; 11-Nov-2013; Active   6/14/2018 at Unknown time   • INVOKANA 300 MG tablet TAKE 1 TABLET DAILY  1 6/14/2018 at Unknown time   • linagliptin (TRADJENTA) 5 MG tablet tablet Take 5 mg by mouth.   6/14/2018 at Unknown time   • metFORMIN ER (GLUCOPHAGE-XR) 500 MG 24 hr tablet Take 1,000 mg by mouth 2 (Two) Times a Day.   6/14/2018 at Unknown time   • rosuvastatin (CRESTOR) 20 MG tablet Take 1 tablet by mouth Daily. 90 tablet 3 6/14/2018 at Unknown time   • SYNTHROID 125 MCG tablet    6/14/2018 at Unknown time   • valsartan-hydrochlorothiazide (DIOVAN-HCT) 80-12.5 MG per tablet TAKE 1 TABLET BY MOUTH DAILY. 90 tablet 1 6/14/2018 at Unknown time   • vitamin D (ERGOCALCIFEROL) 46684 UNITS capsule capsule    Past Week at Unknown time   • ondansetron ODT (ZOFRAN ODT) 8 MG disintegrating tablet Take 1 tablet by mouth Every 8 (Eight) Hours As Needed for Nausea or Vomiting. 10 tablet 0 More than a month at Unknown time   • pantoprazole (PROTONIX) 40 MG EC tablet Take 1 tablet by mouth Daily. 90 tablet 1 6/11/2018   • phentermine 30 MG capsule Take 1 capsule by mouth Every Morning. 30 capsule 0 More than a month at Unknown time   • sucralfate (CARAFATE) 1 g tablet Take 1 tablet by mouth 4 (Four) Times a Day. 60 tablet 2 Unknown at Unknown time   • triamcinolone (KENALOG) 0.1 % ointment As Needed.   More than a month at Unknown time   • valsartan (DIOVAN) 40 MG tablet Take 1 tablet by mouth every night at bedtime. 90 tablet 3 Unknown at Unknown time     Allergies:  Celecoxib; Methocarbamol; Montelukast; Penicillins; Carbomer; Cobalt; Gemifloxacin; and Nickel    Objective      Vital Signs  Temp:  [98.3 °F (36.8 °C)] 98.3 °F (36.8 °C)  Heart Rate:  [73] 73  Resp:  [16] 16  BP: (99)/(56) 99/56    Physical Exam   Constitutional: She is oriented to  person, place, and time. She appears well-developed and well-nourished.   HENT:   Mouth/Throat: Oropharynx is clear and moist.   Eyes: Conjunctivae are normal.   Neck: Neck supple.   Cardiovascular: Normal rate and regular rhythm.    Pulmonary/Chest: Effort normal and breath sounds normal.   Abdominal: Soft. Bowel sounds are normal.   Neurological: She is alert and oriented to person, place, and time.   Skin: Skin is warm and dry.   Psychiatric: She has a normal mood and affect.       Results Review:   I reviewed the patient's new clinical results.      Assessment/Plan     Active Problems:    * No active hospital problems. *      Assessment:  (Family history of colon cancer ).     Plan:   (Colonoscopy risks, alternatives and benefits discussed with patient and the patient is agreeable to having procedure done.).       I discussed the patients findings and my recommendations with patient and nursing staff    Sunil Cardona MD  06/15/18  8:45 AM

## 2018-06-18 LAB
CYTO UR: NORMAL
LAB AP CASE REPORT: NORMAL
PATH REPORT.FINAL DX SPEC: NORMAL
PATH REPORT.GROSS SPEC: NORMAL

## 2018-06-18 RX ORDER — FENOFIBRATE 160 MG/1
TABLET ORAL
Qty: 90 TABLET | Refills: 0 | Status: SHIPPED | OUTPATIENT
Start: 2018-06-18 | End: 2018-09-16 | Stop reason: SDUPTHER

## 2018-06-18 RX ORDER — ROSUVASTATIN CALCIUM 20 MG/1
TABLET, COATED ORAL
Qty: 90 TABLET | Refills: 2 | Status: SHIPPED | OUTPATIENT
Start: 2018-06-18 | End: 2019-03-01 | Stop reason: SDUPTHER

## 2018-06-29 ENCOUNTER — TELEPHONE (OUTPATIENT)
Dept: INTERNAL MEDICINE | Facility: CLINIC | Age: 58
End: 2018-06-29

## 2018-06-29 RX ORDER — METHYLPREDNISOLONE 4 MG/1
TABLET ORAL
Qty: 21 TABLET | Refills: 0 | Status: SHIPPED | OUTPATIENT
Start: 2018-06-29 | End: 2018-07-05

## 2018-07-05 ENCOUNTER — CLINICAL SUPPORT (OUTPATIENT)
Dept: ORTHOPEDIC SURGERY | Facility: CLINIC | Age: 58
End: 2018-07-05

## 2018-07-05 ENCOUNTER — OFFICE VISIT (OUTPATIENT)
Dept: INTERNAL MEDICINE | Facility: CLINIC | Age: 58
End: 2018-07-05

## 2018-07-05 VITALS — BODY MASS INDEX: 37.16 KG/M2 | WEIGHT: 196.8 LBS | HEIGHT: 61 IN | TEMPERATURE: 97.7 F

## 2018-07-05 VITALS
SYSTOLIC BLOOD PRESSURE: 118 MMHG | OXYGEN SATURATION: 95 % | HEART RATE: 99 BPM | DIASTOLIC BLOOD PRESSURE: 70 MMHG | TEMPERATURE: 98.9 F | RESPIRATION RATE: 16 BRPM | BODY MASS INDEX: 37 KG/M2 | WEIGHT: 196 LBS | HEIGHT: 61 IN

## 2018-07-05 DIAGNOSIS — E78.2 MIXED HYPERLIPIDEMIA: ICD-10-CM

## 2018-07-05 DIAGNOSIS — M25.561 CHRONIC PAIN OF RIGHT KNEE: Primary | ICD-10-CM

## 2018-07-05 DIAGNOSIS — E53.8 VITAMIN B12 DEFICIENCY: ICD-10-CM

## 2018-07-05 DIAGNOSIS — G89.29 CHRONIC PAIN OF RIGHT KNEE: Primary | ICD-10-CM

## 2018-07-05 DIAGNOSIS — E03.9 ACQUIRED HYPOTHYROIDISM: ICD-10-CM

## 2018-07-05 DIAGNOSIS — Z00.00 HEALTH CARE MAINTENANCE: Primary | ICD-10-CM

## 2018-07-05 DIAGNOSIS — I10 ESSENTIAL HYPERTENSION: ICD-10-CM

## 2018-07-05 DIAGNOSIS — E11.9 TYPE 2 DIABETES MELLITUS WITHOUT COMPLICATION, WITHOUT LONG-TERM CURRENT USE OF INSULIN (HCC): ICD-10-CM

## 2018-07-05 PROCEDURE — 99396 PREV VISIT EST AGE 40-64: CPT | Performed by: INTERNAL MEDICINE

## 2018-07-05 PROCEDURE — 20610 DRAIN/INJ JOINT/BURSA W/O US: CPT | Performed by: NURSE PRACTITIONER

## 2018-07-05 PROCEDURE — 99214 OFFICE O/P EST MOD 30 MIN: CPT | Performed by: INTERNAL MEDICINE

## 2018-07-05 PROCEDURE — 96372 THER/PROPH/DIAG INJ SC/IM: CPT | Performed by: INTERNAL MEDICINE

## 2018-07-05 RX ORDER — METOPROLOL SUCCINATE 50 MG/1
50 TABLET, EXTENDED RELEASE ORAL DAILY
Qty: 90 TABLET | Refills: 1 | Status: SHIPPED | OUTPATIENT
Start: 2018-07-05 | End: 2019-01-03 | Stop reason: SDUPTHER

## 2018-07-05 RX ORDER — CYANOCOBALAMIN 1000 UG/ML
1000 INJECTION, SOLUTION INTRAMUSCULAR; SUBCUTANEOUS
Status: DISCONTINUED | OUTPATIENT
Start: 2018-07-05 | End: 2019-01-11

## 2018-07-05 RX ORDER — METHYLPREDNISOLONE ACETATE 80 MG/ML
80 INJECTION, SUSPENSION INTRA-ARTICULAR; INTRALESIONAL; INTRAMUSCULAR; SOFT TISSUE
Status: COMPLETED | OUTPATIENT
Start: 2018-07-05 | End: 2018-07-05

## 2018-07-05 RX ORDER — PHENTERMINE HYDROCHLORIDE 30 MG/1
30 CAPSULE ORAL EVERY MORNING
Qty: 30 CAPSULE | Refills: 0 | Status: SHIPPED | OUTPATIENT
Start: 2018-07-05 | End: 2018-08-10 | Stop reason: SDUPTHER

## 2018-07-05 RX ORDER — LIDOCAINE HYDROCHLORIDE 10 MG/ML
2 INJECTION, SOLUTION EPIDURAL; INFILTRATION; INTRACAUDAL; PERINEURAL
Status: COMPLETED | OUTPATIENT
Start: 2018-07-05 | End: 2018-07-05

## 2018-07-05 RX ORDER — ESTRADIOL 10 UG/1
INSERT VAGINAL
COMMUNITY
Start: 2018-05-21 | End: 2019-01-11

## 2018-07-05 RX ADMIN — LIDOCAINE HYDROCHLORIDE 2 ML: 10 INJECTION, SOLUTION EPIDURAL; INFILTRATION; INTRACAUDAL; PERINEURAL at 09:49

## 2018-07-05 RX ADMIN — CYANOCOBALAMIN 1000 MCG: 1000 INJECTION, SOLUTION INTRAMUSCULAR; SUBCUTANEOUS at 15:59

## 2018-07-05 RX ADMIN — METHYLPREDNISOLONE ACETATE 80 MG: 80 INJECTION, SUSPENSION INTRA-ARTICULAR; INTRALESIONAL; INTRAMUSCULAR; SOFT TISSUE at 09:49

## 2018-07-05 NOTE — PROGRESS NOTES
7/5/2018    Lynda Rueda is here today for worsening knee pain. Pt has undergone injection of the knee in the past with good resolution of symptoms. Pt is requesting a repeat injection.     KNEE Injection Procedure Note:    Large Joint Arthrocentesis  Date/Time: 7/5/2018 9:49 AM  Consent given by: patient  Site marked: site marked  Timeout: Immediately prior to procedure a time out was called to verify the correct patient, procedure, equipment, support staff and site/side marked as required   Supporting Documentation  Indications: pain and joint swelling   Procedure Details  Location: knee - R knee  Preparation: Patient was prepped and draped in the usual sterile fashion  Needle gauge: 21 G.  Approach: anterolateral  Medications administered: 2 mL lidocaine PF 1% 1 %; 80 mg methylPREDNISolone acetate 80 MG/ML  Patient tolerance: patient tolerated the procedure well with no immediate complications      Prior to injection risks were discussed including pain, infection, elevated blood sugar.  Patient verbalized understanding would like to proceed with injection    At the conclusion of the injection I discussed the importance of continued quad strengthening exercises on a daily basis. I will see the patient back if the symptoms should fail to improve or worsen.    Eugenia Gupta APRN  7/5/2018

## 2018-07-18 NOTE — PROGRESS NOTES
Subjective   Lynda Rueda is a 57 y.o. female.   She is here today for complete physical exam except for gynecological part and she is also here to follow-up for mixed hyper lipidemia hypertension type 2 diabetes without complications hypothyroidism and vitamin B12 deficiency  History of Present Illness   She is here today for complete physical exam except for gynecological part and she is also here to follow-up for mixed hyper lipidemia which has been stable on current medications and hypertension which is stable on current medications type 2 diabetes without complications which has been improving on current medications and hypothyroidism which is stable on current medication and vitamin B-12 deficiency which has been stable supplementation and her weight is not coming down in either  The following portions of the patient's history were reviewed and updated as appropriate: allergies, current medications, past family history, past medical history, past social history, past surgical history and problem list.    Review of Systems   All other systems reviewed and are negative.      Objective   Physical Exam   Constitutional: She is oriented to person, place, and time. Vital signs are normal. She appears well-developed and well-nourished. She is active.   HENT:   Head: Normocephalic and atraumatic.   Right Ear: Hearing, tympanic membrane, external ear and ear canal normal.   Left Ear: Hearing, tympanic membrane, external ear and ear canal normal.   Nose: Nose normal.   Mouth/Throat: Uvula is midline, oropharynx is clear and moist and mucous membranes are normal.   Eyes: Pupils are equal, round, and reactive to light. Conjunctivae, EOM and lids are normal. Right eye exhibits no discharge. Left eye exhibits no discharge.   Neck: Trachea normal, normal range of motion, full passive range of motion without pain and phonation normal. Neck supple. Carotid bruit is not present. No edema present. No thyroid mass and no  thyromegaly present.   Cardiovascular: Normal rate, regular rhythm, normal heart sounds, intact distal pulses and normal pulses.  Exam reveals no gallop and no friction rub.    No murmur heard.  Pulmonary/Chest: Effort normal and breath sounds normal. No respiratory distress. She has no wheezes. She has no rales.   Abdominal: Soft. Normal appearance, normal aorta and bowel sounds are normal. She exhibits no distension, no abdominal bruit and no mass. There is no hepatosplenomegaly. There is no tenderness. There is no rebound, no guarding and no CVA tenderness. No hernia. Hernia confirmed negative in the right inguinal area and confirmed negative in the left inguinal area.   Musculoskeletal: Normal range of motion. She exhibits no edema or tenderness.    Lynda had a diabetic foot exam performed today.   During the foot exam she had a monofilament test performed (No neuropathy).  Vascular Status -  Her right foot exhibits normal foot vasculature  and no edema. Her left foot exhibits normal foot vasculature  and no edema.  Skin Integrity  -  Her right foot skin is intact.Her left foot skin is intact..  Lymphadenopathy:     She has no cervical adenopathy.     She has no axillary adenopathy.        Right: No inguinal and no supraclavicular adenopathy present.        Left: No inguinal and no supraclavicular adenopathy present.   Neurological: She is alert and oriented to person, place, and time. She has normal strength. No cranial nerve deficit or sensory deficit. She exhibits normal muscle tone. She displays a negative Romberg sign. Coordination normal.   Skin: Skin is warm, dry and intact. No cyanosis. Nails show no clubbing.   Psychiatric: She has a normal mood and affect. Her speech is normal and behavior is normal. Judgment and thought content normal. Cognition and memory are normal.   Nursing note and vitals reviewed.      Assessment/Plan   Diagnoses and all orders for this visit:    Health care maintenance    Mixed  hyperlipidemia    Essential hypertension    Type 2 diabetes mellitus without complication, without long-term current use of insulin (CMS/HCC)    Acquired hypothyroidism    Vitamin B12 deficiency  -     cyanocobalamin injection 1,000 mcg; Inject 1 mL into the appropriate muscle as directed by prescriber Every 28 (Twenty-Eight) Days.    Other orders  -     metoprolol succinate XL (TOPROL-XL) 50 MG 24 hr tablet; Take 1 tablet by mouth Daily.  -     phentermine 30 MG capsule; Take 1 capsule by mouth Every Morning.        Healthcare maintenance fasting labs vaccines colonoscopies pelvic exams mammograms on a regular basis and she has a gynecologist  Mixed hyper lipidemia stable on current medication at this time  Hypertension well-controlled on current medication  Type 2 diabetes without medication getting better and we will help her with this with weight loss medication  Hypothyroid as an stable on current supplementation and we will follow closely  Vitamin B12 deficiency noted and we will provide her with a B12 injection today

## 2018-08-10 RX ORDER — PHENTERMINE HYDROCHLORIDE 30 MG/1
CAPSULE ORAL
Qty: 30 CAPSULE | Refills: 0 | Status: SHIPPED | OUTPATIENT
Start: 2018-08-10 | End: 2019-01-11

## 2018-09-17 RX ORDER — FENOFIBRATE 160 MG/1
TABLET ORAL
Qty: 90 TABLET | Refills: 0 | Status: SHIPPED | OUTPATIENT
Start: 2018-09-17 | End: 2018-12-16 | Stop reason: SDUPTHER

## 2018-12-03 RX ORDER — CITALOPRAM 20 MG/1
TABLET ORAL
Qty: 90 TABLET | Refills: 3 | Status: SHIPPED | OUTPATIENT
Start: 2018-12-03 | End: 2019-03-04 | Stop reason: SDUPTHER

## 2018-12-17 RX ORDER — FENOFIBRATE 160 MG/1
TABLET ORAL
Qty: 90 TABLET | Refills: 1 | Status: SHIPPED | OUTPATIENT
Start: 2018-12-17 | End: 2019-06-17 | Stop reason: SDUPTHER

## 2019-01-04 RX ORDER — METOPROLOL SUCCINATE 50 MG/1
TABLET, EXTENDED RELEASE ORAL
Qty: 90 TABLET | Refills: 0 | Status: SHIPPED | OUTPATIENT
Start: 2019-01-04 | End: 2019-03-29 | Stop reason: SDUPTHER

## 2019-01-11 ENCOUNTER — OFFICE VISIT (OUTPATIENT)
Dept: INTERNAL MEDICINE | Facility: CLINIC | Age: 59
End: 2019-01-11

## 2019-01-11 VITALS
DIASTOLIC BLOOD PRESSURE: 74 MMHG | TEMPERATURE: 99.1 F | HEIGHT: 61 IN | SYSTOLIC BLOOD PRESSURE: 106 MMHG | RESPIRATION RATE: 17 BRPM | BODY MASS INDEX: 25.86 KG/M2 | WEIGHT: 137 LBS | HEART RATE: 86 BPM | OXYGEN SATURATION: 95 %

## 2019-01-11 DIAGNOSIS — M54.42 CHRONIC MIDLINE LOW BACK PAIN WITH LEFT-SIDED SCIATICA: Primary | ICD-10-CM

## 2019-01-11 DIAGNOSIS — J11.1 INFLUENZA: ICD-10-CM

## 2019-01-11 DIAGNOSIS — G89.29 CHRONIC MIDLINE LOW BACK PAIN WITH LEFT-SIDED SCIATICA: Primary | ICD-10-CM

## 2019-01-11 DIAGNOSIS — I10 ESSENTIAL HYPERTENSION: ICD-10-CM

## 2019-01-11 DIAGNOSIS — E78.2 MIXED HYPERLIPIDEMIA: ICD-10-CM

## 2019-01-11 DIAGNOSIS — E53.8 VITAMIN B12 DEFICIENCY: ICD-10-CM

## 2019-01-11 DIAGNOSIS — E11.9 TYPE 2 DIABETES MELLITUS WITHOUT COMPLICATION, WITHOUT LONG-TERM CURRENT USE OF INSULIN (HCC): ICD-10-CM

## 2019-01-11 DIAGNOSIS — E55.9 VITAMIN D DEFICIENCY: ICD-10-CM

## 2019-01-11 DIAGNOSIS — E03.9 ACQUIRED HYPOTHYROIDISM: ICD-10-CM

## 2019-01-11 DIAGNOSIS — E66.01 MORBID EXOGENOUS OBESITY (HCC): ICD-10-CM

## 2019-01-11 PROCEDURE — 99214 OFFICE O/P EST MOD 30 MIN: CPT | Performed by: INTERNAL MEDICINE

## 2019-01-11 RX ORDER — OSELTAMIVIR PHOSPHATE 75 MG/1
75 CAPSULE ORAL 2 TIMES DAILY
Qty: 10 CAPSULE | Refills: 0 | Status: SHIPPED | OUTPATIENT
Start: 2019-01-11 | End: 2019-02-13

## 2019-01-12 LAB
25(OH)D3+25(OH)D2 SERPL-MCNC: 49.3 NG/ML (ref 30–100)
ALBUMIN SERPL-MCNC: 4.8 G/DL (ref 3.5–5.2)
ALBUMIN/GLOB SERPL: 1.8 G/DL
ALP SERPL-CCNC: 52 U/L (ref 39–117)
ALT SERPL-CCNC: 18 U/L (ref 1–33)
AST SERPL-CCNC: 18 U/L (ref 1–32)
BASOPHILS # BLD AUTO: 0.1 10*3/MM3 (ref 0–0.2)
BASOPHILS NFR BLD AUTO: 0.9 % (ref 0–1.5)
BILIRUB SERPL-MCNC: 0.2 MG/DL (ref 0.1–1.2)
BUN SERPL-MCNC: 16 MG/DL (ref 6–20)
BUN/CREAT SERPL: 18.8 (ref 7–25)
CALCIUM SERPL-MCNC: 11 MG/DL (ref 8.6–10.5)
CHLORIDE SERPL-SCNC: 99 MMOL/L (ref 98–107)
CO2 SERPL-SCNC: 26.6 MMOL/L (ref 22–29)
CREAT SERPL-MCNC: 0.85 MG/DL (ref 0.57–1)
EOSINOPHIL # BLD AUTO: 0.22 10*3/MM3 (ref 0–0.7)
EOSINOPHIL NFR BLD AUTO: 1.9 % (ref 0.3–6.2)
ERYTHROCYTE [DISTWIDTH] IN BLOOD BY AUTOMATED COUNT: 14.2 % (ref 11.7–13)
GLOBULIN SER CALC-MCNC: 2.7 GM/DL
GLUCOSE SERPL-MCNC: 164 MG/DL (ref 65–99)
HBA1C MFR BLD: 7.8 % (ref 4.8–5.6)
HCT VFR BLD AUTO: 47 % (ref 35.6–45.5)
HGB BLD-MCNC: 14.6 G/DL (ref 11.9–15.5)
IMM GRANULOCYTES # BLD AUTO: 0.03 10*3/MM3 (ref 0–0.03)
IMM GRANULOCYTES NFR BLD AUTO: 0.3 % (ref 0–0.5)
LYMPHOCYTES # BLD AUTO: 3.68 10*3/MM3 (ref 0.9–4.8)
LYMPHOCYTES NFR BLD AUTO: 31.9 % (ref 19.6–45.3)
MCH RBC QN AUTO: 26.5 PG (ref 26.9–32)
MCHC RBC AUTO-ENTMCNC: 31.1 G/DL (ref 32.4–36.3)
MCV RBC AUTO: 85.5 FL (ref 80.5–98.2)
MICROALBUMIN UR-MCNC: <3 UG/ML
MONOCYTES # BLD AUTO: 0.63 10*3/MM3 (ref 0.2–1.2)
MONOCYTES NFR BLD AUTO: 5.5 % (ref 5–12)
NEUTROPHILS # BLD AUTO: 6.88 10*3/MM3 (ref 1.9–8.1)
NEUTROPHILS NFR BLD AUTO: 59.5 % (ref 42.7–76)
PLATELET # BLD AUTO: 420 10*3/MM3 (ref 140–500)
POTASSIUM SERPL-SCNC: 4.4 MMOL/L (ref 3.5–5.2)
PROT SERPL-MCNC: 7.5 G/DL (ref 6–8.5)
RBC # BLD AUTO: 5.5 10*6/MM3 (ref 3.9–5.2)
SODIUM SERPL-SCNC: 139 MMOL/L (ref 136–145)
T4 FREE SERPL-MCNC: 1.59 NG/DL (ref 0.93–1.7)
TSH SERPL DL<=0.005 MIU/L-ACNC: 1.41 MIU/ML (ref 0.27–4.2)
VIT B12 SERPL-MCNC: 421 PG/ML (ref 211–946)
WBC # BLD AUTO: 11.54 10*3/MM3 (ref 4.5–10.7)

## 2019-01-21 NOTE — PROGRESS NOTES
Subjective   Lynda Rueda is a 58 y.o. female.   She is here today for regular follow-up for mixed hyperlipidemia chronic low back pain influenza hypertension morbid obesity and type 2 diabetes and hypothyroidism and vitamin D deficiency and vitamin B12 deficiency  History of Present Illness   She is here today for follow-up for mixed hyper lipidemia which stable on current medication chronic low back pain which is getting worse influenza which is not stable hypertension which is stable on current medication and obesity which is getting much much better with her diet and exercise and type 2 diabetes which is improving with weight loss and hypothyroidism which has been stable on levothyroxin vitamin D deficiency which has been stable supplementation and vitamin B12 deficiency stable on supplementation  The following portions of the patient's history were reviewed and updated as appropriate: allergies, current medications, past family history, past medical history, past social history, past surgical history and problem list.    Review of Systems   Constitutional: Positive for chills, fatigue and fever.   Endocrine: Negative for cold intolerance, heat intolerance, polydipsia and polyuria.   Genitourinary: Negative for difficulty urinating.   Musculoskeletal: Positive for back pain and myalgias.   Psychiatric/Behavioral: Negative for decreased concentration and dysphoric mood.   All other systems reviewed and are negative.      Objective   Physical Exam   Constitutional: She is oriented to person, place, and time. She appears well-developed and well-nourished. She is cooperative. She appears distressed.   HENT:   Head: Normocephalic and atraumatic.   Right Ear: Hearing, tympanic membrane, external ear and ear canal normal.   Left Ear: Hearing, tympanic membrane, external ear and ear canal normal.   Nose: Nose normal.   Mouth/Throat: Uvula is midline, oropharynx is clear and moist and mucous membranes are normal.  Oropharyngeal exudate: congested nasal passages.   Eyes: Conjunctivae, EOM and lids are normal. Pupils are equal, round, and reactive to light.   Neck: Phonation normal. Neck supple. Carotid bruit is not present.   Cardiovascular: Normal rate, regular rhythm and normal heart sounds. Exam reveals no gallop and no friction rub.   No murmur heard.  Pulmonary/Chest: Effort normal and breath sounds normal. No respiratory distress.   Abdominal: Soft. Bowel sounds are normal. She exhibits no distension and no mass. There is no hepatosplenomegaly. There is no tenderness. There is no rebound and no guarding. No hernia.   Musculoskeletal: She exhibits tenderness (multiple joints and muscle groups). She exhibits no edema.        Lumbar back: She exhibits pain.   Neurological: She is alert and oriented to person, place, and time. Coordination and gait normal.   Skin: Skin is warm and dry.   Psychiatric: She has a normal mood and affect. Her speech is normal and behavior is normal. Judgment and thought content normal.   Nursing note and vitals reviewed.      Assessment/Plan   Diagnoses and all orders for this visit:    Chronic midline low back pain with left-sided sciatica  -     MRI Lumbar Spine Without Contrast    Mixed hyperlipidemia  -     CBC & Differential  -     Comprehensive Metabolic Panel    Influenza    Essential hypertension    Morbid exogenous obesity (CMS/HCC)    Type 2 diabetes mellitus without complication, without long-term current use of insulin (CMS/HCC)  -     Hemoglobin A1c  -     MicroAlbumin, Urine, Random - Urine, Clean Catch    Acquired hypothyroidism  -     T4, Free  -     TSH    Vitamin D deficiency  -     Vitamin D 25 Hydroxy    Vitamin B12 deficiency  -     Vitamin B12    Other orders  -     oseltamivir (TAMIFLU) 75 MG capsule; Take 1 capsule by mouth 2 (Two) Times a Day.      Chronic low back pain with left-sided sciatica we will get MRI lumbar spine and follow from there  Mixed hyper lipidemia  stable on current medication we will follow  Influenza not stable and we'll provide Tamiflu  Morbid obesity getting much better with her weight loss and exercise  Hypertension well-controlled on current medication no changes  Hypothyroidism stable on levothyroxin we will check TSH free T4  Vitamin D deficiency has been stable supplementation we will check levels  Vitamin B12 deficiency stable on supplementation we will check levels

## 2019-01-22 ENCOUNTER — TELEPHONE (OUTPATIENT)
Dept: INTERNAL MEDICINE | Facility: CLINIC | Age: 59
End: 2019-01-22

## 2019-01-22 NOTE — TELEPHONE ENCOUNTER
Pt had MRI Saturday at outside facility  Pt called today in excruciating pain-I advised ER due to pt stating she could barely walk. She verbalized understanding and will go to ER now    Also wanting something to help relieve the pain. Please advise

## 2019-01-23 ENCOUNTER — TELEPHONE (OUTPATIENT)
Dept: INTERNAL MEDICINE | Facility: CLINIC | Age: 59
End: 2019-01-23

## 2019-01-23 DIAGNOSIS — M77.9 BONE SPUR: ICD-10-CM

## 2019-01-23 DIAGNOSIS — M54.16 LUMBAR NERVE ROOT IMPINGEMENT: Primary | ICD-10-CM

## 2019-01-27 NOTE — TELEPHONE ENCOUNTER
Notify patient she has nerve impingement from bone spurs on her L5 lower lumbar spine on the left for which I recommend epidural injection at this time

## 2019-01-28 ENCOUNTER — TELEPHONE (OUTPATIENT)
Dept: INTERNAL MEDICINE | Facility: CLINIC | Age: 59
End: 2019-01-28

## 2019-01-28 DIAGNOSIS — M54.16 LUMBAR RADICULITIS: Primary | ICD-10-CM

## 2019-01-28 RX ORDER — METHYLPREDNISOLONE 4 MG/1
TABLET ORAL
Qty: 21 TABLET | Refills: 0 | Status: SHIPPED | OUTPATIENT
Start: 2019-01-28 | End: 2019-02-13

## 2019-01-28 NOTE — TELEPHONE ENCOUNTER
She called needing a Medrol dose pack because she is in physical Therapy with her back. I talked to Dr. Vázquez and he ordered meds and injections for Pain management. He called and talked to pamella.

## 2019-01-29 NOTE — TELEPHONE ENCOUNTER
Pt notified. Referral placed for epidurals. And pt is also doing physical therapy, she wanted you to know that.

## 2019-02-13 ENCOUNTER — HOSPITAL ENCOUNTER (OUTPATIENT)
Dept: PAIN MEDICINE | Facility: HOSPITAL | Age: 59
Discharge: HOME | End: 2019-02-13
Admitting: ANESTHESIOLOGY

## 2019-02-13 ENCOUNTER — ANESTHESIA (OUTPATIENT)
Dept: PAIN MEDICINE | Facility: HOSPITAL | Age: 59
End: 2019-02-13

## 2019-02-13 ENCOUNTER — HOSPITAL ENCOUNTER (OUTPATIENT)
Dept: GENERAL RADIOLOGY | Facility: HOSPITAL | Age: 59
Discharge: HOME | End: 2019-02-13

## 2019-02-13 ENCOUNTER — ANESTHESIA EVENT (OUTPATIENT)
Dept: PAIN MEDICINE | Facility: HOSPITAL | Age: 59
End: 2019-02-13

## 2019-02-13 VITALS
WEIGHT: 196 LBS | BODY MASS INDEX: 37 KG/M2 | TEMPERATURE: 98.2 F | OXYGEN SATURATION: 97 % | SYSTOLIC BLOOD PRESSURE: 100 MMHG | HEART RATE: 76 BPM | HEIGHT: 61 IN | DIASTOLIC BLOOD PRESSURE: 76 MMHG | RESPIRATION RATE: 16 BRPM

## 2019-02-13 DIAGNOSIS — R52 PAIN: ICD-10-CM

## 2019-02-13 DIAGNOSIS — M51.36 DDD (DEGENERATIVE DISC DISEASE), LUMBAR: Primary | ICD-10-CM

## 2019-02-13 LAB — GLUCOSE BLDC GLUCOMTR-MCNC: 165 MG/DL (ref 70–130)

## 2019-02-13 PROCEDURE — 82962 GLUCOSE BLOOD TEST: CPT

## 2019-02-13 PROCEDURE — 25010000002 METHYLPREDNISOLONE PER 80 MG: Performed by: ANESTHESIOLOGY

## 2019-02-13 PROCEDURE — 77003 FLUOROGUIDE FOR SPINE INJECT: CPT

## 2019-02-13 PROCEDURE — C1755 CATHETER, INTRASPINAL: HCPCS

## 2019-02-13 RX ORDER — LIDOCAINE HYDROCHLORIDE 10 MG/ML
1 INJECTION, SOLUTION INFILTRATION; PERINEURAL ONCE
Status: DISCONTINUED | OUTPATIENT
Start: 2019-02-13 | End: 2019-02-14 | Stop reason: HOSPADM

## 2019-02-13 RX ORDER — MIDAZOLAM HYDROCHLORIDE 1 MG/ML
1 INJECTION INTRAMUSCULAR; INTRAVENOUS
Status: DISCONTINUED | OUTPATIENT
Start: 2019-02-13 | End: 2019-02-14 | Stop reason: HOSPADM

## 2019-02-13 RX ORDER — FENTANYL CITRATE 50 UG/ML
50 INJECTION, SOLUTION INTRAMUSCULAR; INTRAVENOUS AS NEEDED
Status: DISCONTINUED | OUTPATIENT
Start: 2019-02-13 | End: 2019-02-14 | Stop reason: HOSPADM

## 2019-02-13 RX ORDER — METHYLPREDNISOLONE ACETATE 80 MG/ML
80 INJECTION, SUSPENSION INTRA-ARTICULAR; INTRALESIONAL; INTRAMUSCULAR; SOFT TISSUE ONCE
Status: COMPLETED | OUTPATIENT
Start: 2019-02-13 | End: 2019-02-13

## 2019-02-13 RX ADMIN — METHYLPREDNISOLONE ACETATE 80 MG: 80 INJECTION, SUSPENSION INTRA-ARTICULAR; INTRALESIONAL; INTRAMUSCULAR; SOFT TISSUE at 08:13

## 2019-02-13 NOTE — H&P
[facility]    History and Physical    Patient Name: Lynda Rueda  :  1960  MRN:  [id number]  Date of Admission: 2019    Subjective     Patient is a 58 y.o. female presents with chief complaint of acute, constant, moderate, 2-8/10/ sharp, burning, stabbing, tingling, began after bending over to  a piece of lint low back and leg: left pain.  Onset of symptoms was abrupt starting 1 month ago.  Symptoms are associated/aggravated by activity, lifting, lying down, standing or walking for more than a few minutes. Symptoms improve with massage, ice, TENS unit and rest    The following portions of the patients history were reviewed and updated as appropriate: current medications, allergies, past medical history, past surgical history, past family history, past social history and problem list                Objective     Past Medical History:   Past Medical History:   Diagnosis Date   • Diabetes (CMS/HCC)    • GERD (gastroesophageal reflux disease)    • H/O colonoscopy 2012   • Hypertension    • PONV (postoperative nausea and vomiting)    • Thyroid disease      Past Surgical History:   Past Surgical History:   Procedure Laterality Date   • APPENDECTOMY     • BREAST BIOPSY     •  SECTION     • CHOLECYSTECTOMY     • COLONOSCOPY N/A 6/15/2018    Procedure: COLONOSCOPY INTO CECUM AND T.I. WITH BIOPSIES AND COLD BIOPSY POLYPECTOMY;  Surgeon: Sunil Cardona MD;  Location: [facility] ENDOSCOPY;  Service: Gastroenterology   • DILATATION AND CURETTAGE     • HYSTERECTOMY  2007    OVARIAN MASS/SMALL INTESTINE   • KNEE SURGERY Left    • THYROIDECTOMY  2016     Family History:   Family History   Problem Relation Age of Onset   • COPD Mother    • Hyperlipidemia Mother    • Hypertension Mother    • Melanoma Mother    • Heart failure Mother    • Transient ischemic attack Mother    • Colon cancer Father      Social History:   Social History     Tobacco Use   • Smoking status: Never Smoker   •  Smokeless tobacco: Never Used   Substance Use Topics   • Alcohol use: Yes     Comment: SOCIAL DRINKER    • Drug use: Defer       Vital Signs Range for the last 24 hours  Temperature:     Temp Source:     BP: BP: ()/()    Pulse:     Respirations:     SPO2:     O2 Amount (l/min):     O2 Devices     Weight:           --------------------------------------------------------------------------------    Current Outpatient Medications   Medication Sig Dispense Refill   • citalopram (CeleXA) 20 MG tablet TAKE 1 TABLET BY MOUTH EVERY DAY 90 tablet 3   • fenofibrate 160 MG tablet TAKE 1 TABLET BY MOUTH EVERY DAY 90 tablet 1   • glipiZIDE (GLUCOTROL) 10 MG tablet Take 1 tablet (10 mg total) by mouth 2 (two) times a day. (Patient taking differently: Take 10 mg by mouth Daily.) 180 tablet 2   • glucose blood test strip OneTouch Ultra Blue In Vitro Strip; Patient Sig: OneTouch Ultra Blue In Vitro Strip ; 100; 0; 11-Nov-2013; Active     • INVOKANA 300 MG tablet TAKE 1 TABLET DAILY  1   • linagliptin (TRADJENTA) 5 MG tablet tablet Take 5 mg by mouth.     • metFORMIN ER (GLUCOPHAGE-XR) 500 MG 24 hr tablet Take 1,000 mg by mouth 2 (Two) Times a Day.     • MethylPREDNISolone (MEDROL, AMINTA,) 4 MG tablet Take as directed on package instructions. 21 tablet 0   • metoprolol succinate XL (TOPROL-XL) 50 MG 24 hr tablet TAKE 1 TABLET BY MOUTH EVERY DAY 90 tablet 0   • ondansetron ODT (ZOFRAN ODT) 8 MG disintegrating tablet Take 1 tablet by mouth Every 8 (Eight) Hours As Needed for Nausea or Vomiting. 10 tablet 0   • oseltamivir (TAMIFLU) 75 MG capsule Take 1 capsule by mouth 2 (Two) Times a Day. 10 capsule 0   • pantoprazole (PROTONIX) 40 MG EC tablet Take 1 tablet by mouth Daily. 90 tablet 1   • rosuvastatin (CRESTOR) 20 MG tablet TAKE 1 TABLET BY MOUTH EVERY DAY 90 tablet 2   • SYNTHROID 125 MCG tablet      • vitamin D (ERGOCALCIFEROL) 24556 UNITS capsule capsule        Current Facility-Administered Medications   Medication Dose Route  Frequency Provider Last Rate Last Dose   • fentaNYL citrate (PF) (SUBLIMAZE) injection 50 mcg  50 mcg Intravenous PRN Jim Kenny MD       • iopamidol (ISOVUE-M 200) injection 41%  3 mL Epidural Once in imaging Jim Kenny MD       • lidocaine (XYLOCAINE) 1 % injection 1 mL  1 mL Intradermal Once Jim Kenny MD       • methylPREDNISolone acetate (DEPO-medrol) injection 80 mg  80 mg Intramuscular Once Jim Kenny MD       • midazolam (VERSED) injection 1 mg  1 mg Intravenous Q5 Min PRN Jim Kenny MD           --------------------------------------------------------------------------------  Assessment/Plan      Anesthesia Evaluation     Patient summary reviewed and Nursing notes reviewed         Pain impairs ability to perform ADLs: Bathing, Housekeeping, Exercise/Activity, Sleeping and Working  Modalities previously tried to control pain with limited effectiveness within the last 4-6 weeks: Ice, Rest, Heat, OTC medications, Steroids and Physical therapy     Airway   Mallampati: II  Dental - normal exam     Pulmonary - negative pulmonary ROS and normal exam   Cardiovascular - normal exam    (+) hypertension,       Neuro/Psych- negative ROS  (+)   left straight leg raise test,     GI/Hepatic/Renal/Endo    (+) obesity,   diabetes mellitus,     Musculoskeletal (-) negative ROS and normal exam    Abdominal  - normal exam   Substance History - negative use     OB/GYN negative ob/gyn ROS         Other                 Diagnosis and Plan    Treatment Plan  ASA 3      Procedures: Lumbar Epidural Steroid Injection(LESI), With fluoroscopy,       Anesthetic plan and risks discussed with patient.          Diagnosis     * Lumbar degenerative disc disease [M51.36]            CHIEF COMPLAINT:       HISTORY OF PRESENT ILLNESS:      PAST MEDICAL HISTORY:  Current Outpatient Medications on File Prior to Encounter   Medication Sig Dispense Refill   • citalopram (CeleXA) 20 MG tablet TAKE 1 TABLET BY MOUTH EVERY  DAY 90 tablet 3   • fenofibrate 160 MG tablet TAKE 1 TABLET BY MOUTH EVERY DAY 90 tablet 1   • glipiZIDE (GLUCOTROL) 10 MG tablet Take 1 tablet (10 mg total) by mouth 2 (two) times a day. (Patient taking differently: Take 10 mg by mouth Daily.) 180 tablet 2   • glucose blood test strip OneTouch Ultra Blue In Vitro Strip; Patient Sig: OneTouch Ultra Blue In Vitro Strip ; 100; 0; 11-Nov-2013; Active     • INVOKANA 300 MG tablet TAKE 1 TABLET DAILY  1   • linagliptin (TRADJENTA) 5 MG tablet tablet Take 5 mg by mouth.     • metFORMIN ER (GLUCOPHAGE-XR) 500 MG 24 hr tablet Take 1,000 mg by mouth 2 (Two) Times a Day.     • MethylPREDNISolone (MEDROL, AMINTA,) 4 MG tablet Take as directed on package instructions. 21 tablet 0   • metoprolol succinate XL (TOPROL-XL) 50 MG 24 hr tablet TAKE 1 TABLET BY MOUTH EVERY DAY 90 tablet 0   • ondansetron ODT (ZOFRAN ODT) 8 MG disintegrating tablet Take 1 tablet by mouth Every 8 (Eight) Hours As Needed for Nausea or Vomiting. 10 tablet 0   • oseltamivir (TAMIFLU) 75 MG capsule Take 1 capsule by mouth 2 (Two) Times a Day. 10 capsule 0   • pantoprazole (PROTONIX) 40 MG EC tablet Take 1 tablet by mouth Daily. 90 tablet 1   • rosuvastatin (CRESTOR) 20 MG tablet TAKE 1 TABLET BY MOUTH EVERY DAY 90 tablet 2   • SYNTHROID 125 MCG tablet      • vitamin D (ERGOCALCIFEROL) 86215 UNITS capsule capsule        No current facility-administered medications on file prior to encounter.        Past Medical History:   Diagnosis Date   • Diabetes (CMS/HCC)    • GERD (gastroesophageal reflux disease)    • H/O colonoscopy 08/2012   • Hypertension    • PONV (postoperative nausea and vomiting)    • Thyroid disease          SOCIAL HISTORY:  No tobacco    REVIEW OF SYSTEMS:  No hematologic infectious or constitutional symptoms  Other review of systems non-contributory    PHYSICAL EXAM:  There were no vitals taken for this visit.  Well-developed well-nourished no acute distress  Extra ocular movements  intact  Mallampati class 2 airway  Cardiac:  Regular rate and rhythm  Lungs:  Clear to auscultation bilaterally with good effort  Alert and oriented ×3  Deep tendon reflexes normal in the bilateral patella  Negative straight leg raise right, left positive  5 out of 5 strength bilateral upper and lower extremities  Lumbar spine without obvious deformities ecchymoses  Lumbar spine nontender to palpation      DIAGNOSIS:  Post-Op Diagnosis Codes:     * Lumbar degenerative disc disease [M51.36]    PLAN:  1.  Lumbar 4 epidural steroid injections, up to 3, spaced 1-2 weeks apart.  If pain control is acceptable after 1 or 2 injections, it was discussed with the patient that they may return for the subsequent injections if and when their pain returns.  The risks were discussed with the patient including failure of relief, worsening pain, Headache (post dural puncture headache), bleeding (epidural hematoma) and infection (epidural abscess or skin infection).  2.  Physical therapy exercises at home as prescribed by physical therapy or from the pain clinic handout (given to the patient).  Continuation of these exercises every day, or multiple times per week, even when the patient has good pain relief, was stressed to the patient as a preventative measure to decrease the frequency and severity of future pain episodes.  3.  Continue pain medicines as already prescribed.  If patient not currently taking any, it is recommended to begin Acetaminophen 1000 mg po q 8 hours.  If other medicines containing Acetaminophen are currently prescribed, maintain daily dose at 3000 mg.    4.  If they can tolerate NSAIDS, it is recommended to take Ibuprofen 600 mg po q 6 hours for 7 days during pain exacerbations.  Alternatively, they may substitute an NSAID of their choice (e.g. Aleve).  This may be taken at the same time as Acetaminophen.  5.  Heat and ice to the affected area as tolerated for pain control.  It was discussed that heating pads  can cause burns.  6.  Daily low impact exercise such as walking or water exercise was recommended to maintain overall health and aid in weight control.   7.  Follow up as needed for subsequent injections.  8.  Patient was counseled to abstain from tobacco products.

## 2019-02-13 NOTE — ANESTHESIA PROCEDURE NOTES
PAIN Epidural block    Pre-sedation assessment completed: 2/13/2019 8:06 AM    Patient reassessed immediately prior to procedure    Patient location during procedure: pain clinic  Start Time: 2/13/2019 8:06 AM  Stop Time: 2/13/2019 8:15 AM  Indication:at surgeon's request and procedure for pain  Performed By  Anesthesiologist: Jim Kenny MD  Preanesthetic Checklist  Completed: patient identified, site marked, surgical consent, pre-op evaluation, timeout performed, IV checked, risks and benefits discussed and monitors and equipment checked  Additional Notes  Dx:  Post-Op Diagnosis Codes:     * Lumbar degenerative disc disease (M51.36)  80 mg depomedrol in epid    Plan : return to clinic as needed  Prep:  Pt Position:prone (prone)  Sterile Tech:cap, gloves, mask and sterile barrier  Prep:chlorhexidine gluconate and isopropyl alcohol  Monitoring:blood pressure monitoring, EKG and continuous pulse oximetry  Procedure:  Sedation: no   Approach:left paramedian  Guidance: fluoroscopy and c arm pa and lat and loss of resistance  Location:lumbar  Level:5-6 (interlaminar    L5S1)  Needle Type:Webrootmilady  Needle Gauge:20  Aspiration:negative  Medications:  Depomedrol:80 mg  Preservative Free Saline:3mL    Post Assessment:  Post-procedure: bandaid.  Pt Tolerance:patient tolerated the procedure well with no apparent complications  Complications:no

## 2019-03-01 RX ORDER — ROSUVASTATIN CALCIUM 20 MG/1
TABLET, COATED ORAL
Qty: 90 TABLET | Refills: 1 | Status: SHIPPED | OUTPATIENT
Start: 2019-03-01 | End: 2019-08-28 | Stop reason: SDUPTHER

## 2019-03-04 RX ORDER — CITALOPRAM 20 MG/1
20 TABLET ORAL DAILY
Qty: 90 TABLET | Refills: 0 | Status: SHIPPED | OUTPATIENT
Start: 2019-03-04 | End: 2021-01-27

## 2019-03-06 ENCOUNTER — APPOINTMENT (OUTPATIENT)
Dept: PAIN MEDICINE | Facility: HOSPITAL | Age: 59
End: 2019-03-06

## 2019-03-29 RX ORDER — METOPROLOL SUCCINATE 50 MG/1
TABLET, EXTENDED RELEASE ORAL
Qty: 90 TABLET | Refills: 0 | Status: SHIPPED | OUTPATIENT
Start: 2019-03-29

## 2019-04-10 ENCOUNTER — OFFICE VISIT (OUTPATIENT)
Dept: NEUROSURGERY | Facility: CLINIC | Age: 59
End: 2019-04-10

## 2019-04-10 VITALS
HEIGHT: 61 IN | WEIGHT: 197 LBS | DIASTOLIC BLOOD PRESSURE: 77 MMHG | SYSTOLIC BLOOD PRESSURE: 113 MMHG | BODY MASS INDEX: 37.19 KG/M2 | HEART RATE: 77 BPM

## 2019-04-10 DIAGNOSIS — M54.16 LUMBAR RADICULOPATHY: Primary | ICD-10-CM

## 2019-04-10 PROBLEM — G89.29 CHRONIC MIDLINE LOW BACK PAIN WITH LEFT-SIDED SCIATICA: Status: RESOLVED | Noted: 2019-01-11 | Resolved: 2019-04-10

## 2019-04-10 PROBLEM — M54.42 CHRONIC MIDLINE LOW BACK PAIN WITH LEFT-SIDED SCIATICA: Status: RESOLVED | Noted: 2019-01-11 | Resolved: 2019-04-10

## 2019-04-10 PROCEDURE — 99204 OFFICE O/P NEW MOD 45 MIN: CPT | Performed by: PHYSICIAN ASSISTANT

## 2019-04-10 RX ORDER — ACETAMINOPHEN 500 MG
1000 TABLET ORAL EVERY 6 HOURS PRN
COMMUNITY
End: 2021-02-09 | Stop reason: HOSPADM

## 2019-04-10 RX ORDER — IBUPROFEN 600 MG/1
600 TABLET ORAL EVERY 6 HOURS PRN
COMMUNITY
End: 2021-02-09 | Stop reason: HOSPADM

## 2019-04-10 NOTE — PROGRESS NOTES
Subjective   Patient ID: Lynda Rueda is a 58 y.o. female is here today as a self referral for back pain that radiates into bilateral lower extremity's L>R. She has had approximately 5-6 weeks of physical therapy and 1 CARRIE. She currently alternates between Ibuprofen and Tylenol.    Back Pain   This is a chronic problem. The current episode started more than 1 month ago. The problem occurs constantly. The problem has been waxing and waning since onset. The pain is present in the gluteal and sacro-iliac. The quality of the pain is described as burning, aching and shooting. The pain radiates to the left knee and left foot. The pain is at a severity of 10/10. The pain is severe. The pain is the same all the time. The symptoms are aggravated by lying down. Stiffness is present all day. Associated symptoms include leg pain and numbness. Pertinent negatives include no abdominal pain, bladder incontinence, bowel incontinence, chest pain, dysuria, fever, headaches, paresis, paresthesias, pelvic pain, perianal numbness, weakness or weight loss. Risk factors include lack of exercise, recent trauma, sedentary lifestyle and obesity. She has tried home exercises, NSAIDs, bed rest and chiropractic manipulation (Physical therapy, 1 Lumbar CARRIE, Accupuncture) for the symptoms. The treatment provided mild relief.       The following portions of the patient's history were reviewed and updated as appropriate: allergies, current medications, past family history, past medical history, past social history, past surgical history and problem list.    Review of Systems   Constitutional: Positive for diaphoresis, fatigue and unexpected weight change. Negative for fever and weight loss.   Respiratory: Negative for chest tightness and stridor.    Cardiovascular: Negative for chest pain.   Gastrointestinal: Negative for abdominal pain and bowel incontinence.   Genitourinary: Negative for bladder incontinence, difficulty urinating, dysuria  and pelvic pain.   Musculoskeletal: Positive for arthralgias, back pain and gait problem.        Bilateral lower extremity pain L>R   Neurological: Positive for numbness. Negative for weakness, headaches and paresthesias.   All other systems reviewed and are negative.      Objective   Physical Exam   Constitutional: She is oriented to person, place, and time. She appears well-developed and well-nourished.   HENT:   Head: Normocephalic and atraumatic.   Right Ear: External ear normal.   Left Ear: External ear normal.   Eyes: Conjunctivae and EOM are normal. Pupils are equal, round, and reactive to light. Right eye exhibits no discharge. Left eye exhibits no discharge.   Neck: Normal range of motion. Neck supple. No tracheal deviation present.   Cardiovascular: Intact distal pulses.   Pulmonary/Chest: Effort normal. No stridor. No respiratory distress.   Musculoskeletal: Normal range of motion. She exhibits no edema, tenderness or deformity.   Neurological: She is alert and oriented to person, place, and time. She has normal strength and normal reflexes. She displays no atrophy, no tremor and normal reflexes. No cranial nerve deficit or sensory deficit. She exhibits normal muscle tone. She displays a negative Romberg sign. She displays no seizure activity. Coordination and gait normal.   No long tract signs   Skin: Skin is warm and dry.   Psychiatric: She has a normal mood and affect. Her behavior is normal. Judgment and thought content normal.   Nursing note and vitals reviewed.    Neurologic Exam     Mental Status   Oriented to person, place, and time.     Cranial Nerves     CN III, IV, VI   Pupils are equal, round, and reactive to light.  Extraocular motions are normal.     Motor Exam     Strength   Strength 5/5 throughout.       Assessment/Plan   Independent Review of Radiographic Studies:    I did review the lumbar spine MRI from [facility] from January 19, 2019.  It does show multilevel degenerative disc  disease most significant at L5-S1.  There is facet arthropathy bilaterally at both L4-L5 and L5-S1 with moderate foraminal narrowing bilaterally at L4 S1.  There is a left extraforaminal disc protrusion at L5-S1 which causes left L5 nerve root compression.  Medical Decision Making:    Ms. Rueda to see us today for a 3-month history of low back pain that radiates into the left buttock and lateral thigh.  She also reports significant left medial knee pain.  Both the back and leg pain as well as knee pain began without accident or injury.  She has completed a course of physical therapy which has not helped and in fact often made her pain worse.  She had one epidural as well which did not help.  The back and buttock and lateral thigh pain are described as a moderate burning/aching type pain.  The most bothersome area of pain involves the left knee and she is very tender to palpation along the left medial knee and describes increased pain with any weightbearing.  She denies any focal weakness or incontinence.  No significant numbness or tingling.      I did review the MRI with the patient.  While I do feel that her buttock and thigh pain are radicular in nature and secondary to the foraminal narrowing at L4-L5 I do suspect that she has a secondary issue causing her knee pain.  She does have an appointment with an orthopedic surgeon tomorrow.  We discussed trying a second epidural given that she has only had one injection.  I explained that they do tend to build on each other and hopefully she would get some increased relief after the second injection.  She is aware of the risks of bleeding, infection and headache.  She is agreeable to getting a second injection and again in the interim will see her orthopedic surgeon.  Her gait is quite antalgic secondary to the knee pain which I think is exacerbating the lumbar issues and lumbar radiculopathy.  We did discuss the possibility of a myelogram if conservative  management fails to help with her leg pain. F/u in 3wks.     Lynda was seen today for back pain.    Diagnoses and all orders for this visit:    Lumbar radiculopathy  -     Epidural Block      Return in about 3 weeks (around 5/1/2019).

## 2019-04-11 ENCOUNTER — OFFICE VISIT (OUTPATIENT)
Dept: ORTHOPEDIC SURGERY | Facility: CLINIC | Age: 59
End: 2019-04-11

## 2019-04-11 VITALS — BODY MASS INDEX: 37.38 KG/M2 | WEIGHT: 198 LBS | HEIGHT: 61 IN | TEMPERATURE: 99 F

## 2019-04-11 DIAGNOSIS — M25.562 LEFT KNEE PAIN, UNSPECIFIED CHRONICITY: Primary | ICD-10-CM

## 2019-04-11 PROCEDURE — 99213 OFFICE O/P EST LOW 20 MIN: CPT | Performed by: ORTHOPAEDIC SURGERY

## 2019-04-11 PROCEDURE — 73562 X-RAY EXAM OF KNEE 3: CPT | Performed by: ORTHOPAEDIC SURGERY

## 2019-04-11 PROCEDURE — 20610 DRAIN/INJ JOINT/BURSA W/O US: CPT | Performed by: ORTHOPAEDIC SURGERY

## 2019-04-11 RX ADMIN — LIDOCAINE HYDROCHLORIDE 2 ML: 20 INJECTION, SOLUTION EPIDURAL; INFILTRATION; INTRACAUDAL; PERINEURAL at 15:40

## 2019-04-11 RX ADMIN — METHYLPREDNISOLONE ACETATE 80 MG: 80 INJECTION, SUSPENSION INTRA-ARTICULAR; INTRALESIONAL; INTRAMUSCULAR; SOFT TISSUE at 15:40

## 2019-04-11 NOTE — PROGRESS NOTES
Patient: Lynda Rueda  YOB: 1960 58 y.o. female  Medical Record Number: [id number]    Chief Complaints:   Chief Complaint   Patient presents with   • Left Knee - Establish Care, Pain       History of Present Illness:Lynda Rueda is a 58 y.o. female who presents with left knee and leg pain.  She describes a burning aching pain down the lateral aspect of the left calf also left knee pain as well as some proximal hip and thigh pain.  She has seen Petra Florence and undergone MRI and noted to have a L5 nerve compression on the left side.  Serial felt that she may have some combination of back and knee symptoms and she is here for me to evaluate today.    Allergies:   Allergies   Allergen Reactions   • Celecoxib Hives   • Methocarbamol Hives     ROBAXIN    • Montelukast Hives   • Penicillins Hives   • Carbomer Other (See Comments)     UNKNOWN REACTION   • [place] Other (See Comments)     UNKNOWN PER PT   • Gemifloxacin    • Nickel Other (See Comments)     WHEN PT TOOK THE ALLERGY TEST NICKEL, ACRYLIC AND COBALT CAME UP AS ALLERGIES   • Tamiflu [Oseltamivir Phosphate] Rash       Medications:   Current Outpatient Medications   Medication Sig Dispense Refill   • acetaminophen (TYLENOL) 500 MG tablet Take 1,000 mg by mouth Every 6 (Six) Hours As Needed for Mild Pain .     • citalopram (CeleXA) 20 MG tablet Take 1 tablet by mouth Daily. 90 tablet 0   • Empagliflozin (JARDIANCE) 25 MG tablet Take  by mouth Daily.     • fenofibrate 160 MG tablet TAKE 1 TABLET BY MOUTH EVERY DAY 90 tablet 1   • glipiZIDE (GLUCOTROL) 10 MG tablet Take 1 tablet (10 mg total) by mouth 2 (two) times a day. (Patient taking differently: Take 10 mg by mouth Daily.) 180 tablet 2   • glucose blood test strip OneTouch Ultra Blue In Vitro Strip; Patient Sig: OneTouch Ultra Blue In Vitro Strip ; 100; 0; 11-Nov-2013; Active     • ibuprofen (ADVIL,MOTRIN) 600 MG tablet Take 600 mg by mouth Every 6 (Six) Hours As Needed for Mild Pain .  "    • linagliptin (TRADJENTA) 5 MG tablet tablet Take 5 mg by mouth.     • metFORMIN ER (GLUCOPHAGE-XR) 500 MG 24 hr tablet Take 1,000 mg by mouth 2 (Two) Times a Day.     • metoprolol succinate XL (TOPROL-XL) 50 MG 24 hr tablet TAKE 1 TABLET BY MOUTH EVERY DAY**NEEDS TO MAKE APPT** 90 tablet 0   • pantoprazole (PROTONIX) 40 MG EC tablet Take 1 tablet by mouth Daily. 90 tablet 1   • rosuvastatin (CRESTOR) 20 MG tablet TAKE 1 TABLET BY MOUTH EVERY DAY 90 tablet 1   • SITagliptin (JANUVIA) 100 MG tablet Take 100 mg by mouth Daily.     • SYNTHROID 125 MCG tablet      • vitamin D (ERGOCALCIFEROL) 00146 UNITS capsule capsule        No current facility-administered medications for this visit.          The following portions of the patient's history were reviewed and updated as appropriate: allergies, current medications, past family history, past medical history, past social history, past surgical history and problem list.    Review of Systems:   A 14 point review of systems was performed. All systems negative except pertinent positives/negative listed in HPI above    Physical Exam:   Vitals:    04/11/19 1456   Temp: 99 °F (37.2 °C)   Weight: 89.8 kg (198 lb)   Height: 154.9 cm (61\")       General: A and O x 3, ASA, NAD    SCLERA:    Normal    DENTITION:   Normal  Knee:  left    ALIGNMENT:    Neutral to slight varus  ,   Patella  tracks  midline    GAIT:    Slightly antalgic    SKIN:    No abnormality    RANGE OF MOTION:   2  -  120   DEG    STRENGTH:   4  / 5    LIGAMENTS:    No varus / valgus instability.   Negative  Lachman.    MENISCUS:     Negative   Samir       DISTAL PULSES:    Paplable    DISTAL SENSATION :   Intact    LYMPHATICS:     No   lymphadenopathy    OTHER:          - Positive   effusion      - Crepitance with ROM          Radiology:  Xrays 3views left knee (ap,lateral, sunrise) were ordered and reviewed for evaluation of knee pain demonstrating minimal arthritic findings. There are no previous films " for comparision.    Assessment/Plan: Left knee pain more significant than right.  She has known right knee osteoarthritis.  I am not sure how much of this is coming from the knee versus radiculopathy.  We are going to do an injection.  I injected the left knee as below we will see how she responds to that she will let me know and will make decision on future intervention such as MRI depending upon her response to the injection.  Large Joint Arthrocentesis: L knee  Date/Time: 4/11/2019 3:40 PM  Consent given by: patient  Site marked: site marked  Timeout: Immediately prior to procedure a time out was called to verify the correct patient, procedure, equipment, support staff and site/side marked as required   Supporting Documentation  Indications: pain and joint swelling   Procedure Details  Location: knee - L knee  Preparation: Patient was prepped and draped in the usual sterile fashion  Needle gauge: 21 g.  Approach: anterolateral  Medications administered: 2 mL lidocaine PF 2% 2 %; 80 mg methylPREDNISolone acetate 80 MG/ML  Patient tolerance: patient tolerated the procedure well with no immediate complications            Thomas Bird MD  4/11/2019

## 2019-04-12 RX ORDER — METHYLPREDNISOLONE ACETATE 80 MG/ML
80 INJECTION, SUSPENSION INTRA-ARTICULAR; INTRALESIONAL; INTRAMUSCULAR; SOFT TISSUE
Status: COMPLETED | OUTPATIENT
Start: 2019-04-11 | End: 2019-04-11

## 2019-04-12 RX ORDER — LIDOCAINE HYDROCHLORIDE 20 MG/ML
2 INJECTION, SOLUTION EPIDURAL; INFILTRATION; INTRACAUDAL; PERINEURAL
Status: COMPLETED | OUTPATIENT
Start: 2019-04-11 | End: 2019-04-11

## 2019-04-25 ENCOUNTER — ANESTHESIA (OUTPATIENT)
Dept: PAIN MEDICINE | Facility: HOSPITAL | Age: 59
End: 2019-04-25

## 2019-04-25 ENCOUNTER — HOSPITAL ENCOUNTER (OUTPATIENT)
Dept: GENERAL RADIOLOGY | Facility: HOSPITAL | Age: 59
Discharge: HOME | End: 2019-04-25

## 2019-04-25 ENCOUNTER — ANESTHESIA EVENT (OUTPATIENT)
Dept: PAIN MEDICINE | Facility: HOSPITAL | Age: 59
End: 2019-04-25

## 2019-04-25 ENCOUNTER — HOSPITAL ENCOUNTER (OUTPATIENT)
Dept: PAIN MEDICINE | Facility: HOSPITAL | Age: 59
Discharge: HOME | End: 2019-04-25
Admitting: ANESTHESIOLOGY

## 2019-04-25 VITALS
TEMPERATURE: 97.7 F | HEART RATE: 72 BPM | SYSTOLIC BLOOD PRESSURE: 105 MMHG | OXYGEN SATURATION: 98 % | DIASTOLIC BLOOD PRESSURE: 62 MMHG | RESPIRATION RATE: 16 BRPM

## 2019-04-25 DIAGNOSIS — M54.16 LUMBAR RADICULOPATHY: Primary | ICD-10-CM

## 2019-04-25 DIAGNOSIS — R52 PAIN: ICD-10-CM

## 2019-04-25 PROCEDURE — 25010000002 METHYLPREDNISOLONE PER 80 MG: Performed by: ANESTHESIOLOGY

## 2019-04-25 PROCEDURE — 77003 FLUOROGUIDE FOR SPINE INJECT: CPT

## 2019-04-25 PROCEDURE — C1755 CATHETER, INTRASPINAL: HCPCS

## 2019-04-25 RX ORDER — SODIUM CHLORIDE 0.9 % (FLUSH) 0.9 %
1-10 SYRINGE (ML) INJECTION AS NEEDED
Status: DISCONTINUED | OUTPATIENT
Start: 2019-04-25 | End: 2019-04-26 | Stop reason: HOSPADM

## 2019-04-25 RX ORDER — MIDAZOLAM HYDROCHLORIDE 1 MG/ML
1 INJECTION INTRAMUSCULAR; INTRAVENOUS AS NEEDED
Status: DISCONTINUED | OUTPATIENT
Start: 2019-04-25 | End: 2019-04-26 | Stop reason: HOSPADM

## 2019-04-25 RX ORDER — METHYLPREDNISOLONE ACETATE 80 MG/ML
80 INJECTION, SUSPENSION INTRA-ARTICULAR; INTRALESIONAL; INTRAMUSCULAR; SOFT TISSUE ONCE
Status: COMPLETED | OUTPATIENT
Start: 2019-04-25 | End: 2019-04-25

## 2019-04-25 RX ORDER — FENTANYL CITRATE 50 UG/ML
50 INJECTION, SOLUTION INTRAMUSCULAR; INTRAVENOUS AS NEEDED
Status: DISCONTINUED | OUTPATIENT
Start: 2019-04-25 | End: 2019-04-26 | Stop reason: HOSPADM

## 2019-04-25 RX ORDER — LIDOCAINE HYDROCHLORIDE 10 MG/ML
1 INJECTION, SOLUTION INFILTRATION; PERINEURAL ONCE AS NEEDED
Status: DISCONTINUED | OUTPATIENT
Start: 2019-04-25 | End: 2019-04-26 | Stop reason: HOSPADM

## 2019-04-25 RX ADMIN — METHYLPREDNISOLONE ACETATE 80 MG: 80 INJECTION, SUSPENSION INTRA-ARTICULAR; INTRALESIONAL; INTRAMUSCULAR; SOFT TISSUE at 08:15

## 2019-04-25 NOTE — ANESTHESIA PROCEDURE NOTES
PAIN Epidural block      Patient reassessed immediately prior to procedure    Patient location during procedure: pain clinic  Start Time: 4/25/2019 8:07 AM  Stop Time: 4/25/2019 8:13 AM  Indication:procedure for pain  Performed By  Anesthesiologist: Shin Mayo MD  Preanesthetic Checklist  Completed: patient identified, surgical consent, pre-op evaluation, timeout performed, IV checked, risks and benefits discussed and monitors and equipment checked  Additional Notes  Diagnosis:  Post-Op Diagnosis Codes:     * Lumbar degenerative disc disease (M51.36)     * Lumbar neuritis (M54.16)      Prep:  Pt Position:prone  Sterile Tech:gloves, mask and sterile barrier  Prep:chlorhexidine gluconate and isopropyl alcohol  Monitoring:blood pressure monitoring, continuous pulse oximetry and EKG  Procedure:Sedation: no     Approach:left paramedian  Guidance: fluoroscopy  Location:lumbar  Interspace: L5-S1.  Needle Type:Tuohy  Needle Gauge:20  Aspiration:negative  Test Dose:negative  Medications:  Depomedrol:80 mg  Preservative Free Saline:3mL    Post Assessment:  Dressing:occlusive dressing applied  Pt Tolerance:patient tolerated the procedure well with no apparent complications  Complications:no

## 2019-04-25 NOTE — H&P
INTERVAL HISTORY:    The patient returns for another Lumbar epidural steroid injection today.  They have received 60% improvement since their last injection with a pain level of 2-4/10 at its worst recently.    Conservative measures tried in the interim     Current Outpatient Medications on File Prior to Encounter   Medication Sig Dispense Refill   • acetaminophen (TYLENOL) 500 MG tablet Take 1,000 mg by mouth Every 6 (Six) Hours As Needed for Mild Pain .     • citalopram (CeleXA) 20 MG tablet Take 1 tablet by mouth Daily. 90 tablet 0   • Empagliflozin (JARDIANCE) 25 MG tablet Take  by mouth Daily.     • fenofibrate 160 MG tablet TAKE 1 TABLET BY MOUTH EVERY DAY 90 tablet 1   • glipiZIDE (GLUCOTROL) 10 MG tablet Take 1 tablet (10 mg total) by mouth 2 (two) times a day. (Patient taking differently: Take 10 mg by mouth Daily.) 180 tablet 2   • glucose blood test strip OneTouch Ultra Blue In Vitro Strip; Patient Sig: OneTouch Ultra Blue In Vitro Strip ; 100; 0; 11-Nov-2013; Active     • ibuprofen (ADVIL,MOTRIN) 600 MG tablet Take 600 mg by mouth Every 6 (Six) Hours As Needed for Mild Pain .     • SITagliptin (JANUVIA) 100 MG tablet Take 100 mg by mouth Daily.     • SYNTHROID 125 MCG tablet      • vitamin D (ERGOCALCIFEROL) 08608 UNITS capsule capsule      • linagliptin (TRADJENTA) 5 MG tablet tablet Take 5 mg by mouth.     • metFORMIN ER (GLUCOPHAGE-XR) 500 MG 24 hr tablet Take 1,000 mg by mouth 2 (Two) Times a Day.     • metoprolol succinate XL (TOPROL-XL) 50 MG 24 hr tablet TAKE 1 TABLET BY MOUTH EVERY DAY**NEEDS TO MAKE APPT** 90 tablet 0   • pantoprazole (PROTONIX) 40 MG EC tablet Take 1 tablet by mouth Daily. 90 tablet 1   • rosuvastatin (CRESTOR) 20 MG tablet TAKE 1 TABLET BY MOUTH EVERY DAY 90 tablet 1     No current facility-administered medications on file prior to encounter.        Past Medical History:   Diagnosis Date   • Anxiety    • Diabetes (CMS/HCC)    • GERD (gastroesophageal reflux disease)    • H/O  colonoscopy 08/2012   • Hyperlipidemia    • Hypertension    • Low back pain    • PONV (postoperative nausea and vomiting)    • Thyroid disease        No hematologic infectious or constitutional symptoms    Exam:  /82 (BP Location: Left arm, Patient Position: Sitting)   Pulse 91   Temp 36.5 °C (97.7 °F) (Oral)   Resp 16   SpO2 98%   Airway Mallampatti 2  Alert and oriented      Diagnosis:  Post-Op Diagnosis Codes:     * Lumbar degenerative disc disease [M51.36]     * Lumbar neuritis [M54.16]    Plan:  Lumbar epidural steroid injection under fluoroscopic guidance    I have encouraged them to continue:  1.  Physical therapy exercises at home as prescribed by physical therapy or from the pain clinic handout (given to the patient).  Continuation of these exercises every day, or multiple times per week, even when the patient has good pain relief, was stressed to the patient as a preventative measure to decrease the frequency and severity of future pain episodes.  2.  Continue pain medicines as already prescribed.  If patient not currently taking any, it is recommended to begin Acetaminophen 1000 mg po q 8 hours.  If other medicines containing Acetaminophen are currently prescribed, maintain daily dose at 3000mg.    3.  If they can tolerate NSAIDS, it is recommended to take Ibuprofen 600 mg po q 6 hours for 7 days during pain exacerbations.   Alternatively, they may substitute an NSAID of their choice (e.g. Aleve)  4.  Heat and ice to the affected area as tolerated for pain control.  It was discussed that heating pads can cause burns.  5.  Low impact exercise such as walking or water exercise was recommended to maintain overall health and aid in weight control.   6.  Follow up as needed for subsequent injections.  7.  Patient was counseled to abstain from tobacco products.

## 2019-05-02 ENCOUNTER — OFFICE VISIT (OUTPATIENT)
Dept: NEUROSURGERY | Facility: CLINIC | Age: 59
End: 2019-05-02

## 2019-05-02 VITALS
WEIGHT: 198 LBS | HEIGHT: 61 IN | SYSTOLIC BLOOD PRESSURE: 106 MMHG | HEART RATE: 75 BPM | DIASTOLIC BLOOD PRESSURE: 69 MMHG | BODY MASS INDEX: 37.38 KG/M2

## 2019-05-02 DIAGNOSIS — M54.16 LUMBAR RADICULOPATHY: Primary | ICD-10-CM

## 2019-05-02 PROBLEM — E11.9 TYPE 2 DIABETES MELLITUS: Status: RESOLVED | Noted: 2017-01-21 | Resolved: 2019-05-02

## 2019-05-02 PROCEDURE — 99213 OFFICE O/P EST LOW 20 MIN: CPT | Performed by: PHYSICIAN ASSISTANT

## 2019-06-17 RX ORDER — FENOFIBRATE 160 MG/1
TABLET ORAL
Qty: 90 TABLET | Refills: 1 | Status: SHIPPED | OUTPATIENT
Start: 2019-06-17

## 2019-06-21 RX ORDER — METOPROLOL SUCCINATE 50 MG/1
TABLET, EXTENDED RELEASE ORAL
Qty: 90 TABLET | Refills: 0 | OUTPATIENT
Start: 2019-06-21

## 2019-06-26 ENCOUNTER — APPOINTMENT (OUTPATIENT)
Dept: WOMENS IMAGING | Facility: HOSPITAL | Age: 59
End: 2019-06-26

## 2019-06-26 PROCEDURE — 77063 BREAST TOMOSYNTHESIS BI: CPT | Performed by: RADIOLOGY

## 2019-06-26 PROCEDURE — 77067 SCR MAMMO BI INCL CAD: CPT | Performed by: RADIOLOGY

## 2019-07-18 ENCOUNTER — OFFICE VISIT (OUTPATIENT)
Dept: ORTHOPEDIC SURGERY | Facility: CLINIC | Age: 59
End: 2019-07-18

## 2019-07-18 VITALS — WEIGHT: 194.9 LBS | HEIGHT: 62 IN | BODY MASS INDEX: 35.87 KG/M2 | TEMPERATURE: 97.4 F

## 2019-07-18 DIAGNOSIS — M25.562 CHRONIC PAIN OF LEFT KNEE: Primary | ICD-10-CM

## 2019-07-18 DIAGNOSIS — G89.29 CHRONIC PAIN OF LEFT KNEE: Primary | ICD-10-CM

## 2019-07-18 PROCEDURE — 20610 DRAIN/INJ JOINT/BURSA W/O US: CPT | Performed by: ORTHOPAEDIC SURGERY

## 2019-07-18 PROCEDURE — 99213 OFFICE O/P EST LOW 20 MIN: CPT | Performed by: ORTHOPAEDIC SURGERY

## 2019-07-18 RX ORDER — METHYLPREDNISOLONE ACETATE 80 MG/ML
80 INJECTION, SUSPENSION INTRA-ARTICULAR; INTRALESIONAL; INTRAMUSCULAR; SOFT TISSUE
Status: COMPLETED | OUTPATIENT
Start: 2019-07-18 | End: 2019-07-18

## 2019-07-18 RX ADMIN — METHYLPREDNISOLONE ACETATE 80 MG: 80 INJECTION, SUSPENSION INTRA-ARTICULAR; INTRALESIONAL; INTRAMUSCULAR; SOFT TISSUE at 11:15

## 2019-07-18 NOTE — PROGRESS NOTES
Patient: Lynda Rueda  YOB: 1960 58 y.o. female  Medical Record Number: [id number]    Chief Complaints: left knee pain    History of Present Illness:Lynda Rueda is a 58 y.o. female who presents for follow-up of  Left medial knee aching pain. Not improved with gel or intraarticular injection. Moderate pain with pressure or activity.    Allergies:   Allergies   Allergen Reactions   • Celecoxib Hives   • Methocarbamol Hives     ROBAXIN    • Montelukast Hives   • Penicillins Hives   • Carbomer Other (See Comments)     UNKNOWN REACTION   • [place] Other (See Comments)     UNKNOWN PER PT   • Gemifloxacin    • Nickel Other (See Comments)     WHEN PT TOOK THE ALLERGY TEST NICKEL, ACRYLIC AND COBALT CAME UP AS ALLERGIES   • Tamiflu [Oseltamivir Phosphate] Rash       Medications:   Current Outpatient Medications   Medication Sig Dispense Refill   • acetaminophen (TYLENOL) 500 MG tablet Take 1,000 mg by mouth Every 6 (Six) Hours As Needed for Mild Pain .     • citalopram (CeleXA) 20 MG tablet Take 1 tablet by mouth Daily. 90 tablet 0   • Empagliflozin (JARDIANCE) 25 MG tablet Take  by mouth Daily.     • fenofibrate 160 MG tablet TAKE 1 TABLET BY MOUTH EVERY DAY 90 tablet 1   • glipiZIDE (GLUCOTROL) 10 MG tablet Take 1 tablet (10 mg total) by mouth 2 (two) times a day. (Patient taking differently: Take 10 mg by mouth Daily.) 180 tablet 2   • glucose blood test strip OneTouch Ultra Blue In Vitro Strip; Patient Sig: OneTouch Ultra Blue In Vitro Strip ; 100; 0; 11-Nov-2013; Active     • ibuprofen (ADVIL,MOTRIN) 600 MG tablet Take 600 mg by mouth Every 6 (Six) Hours As Needed for Mild Pain .     • linagliptin (TRADJENTA) 5 MG tablet tablet Take 5 mg by mouth.     • metFORMIN ER (GLUCOPHAGE-XR) 500 MG 24 hr tablet Take 1,000 mg by mouth 2 (Two) Times a Day.     • metoprolol succinate XL (TOPROL-XL) 50 MG 24 hr tablet TAKE 1 TABLET BY MOUTH EVERY DAY**NEEDS TO MAKE APPT** 90 tablet 0   • pantoprazole  "(PROTONIX) 40 MG EC tablet Take 1 tablet by mouth Daily. 90 tablet 1   • rosuvastatin (CRESTOR) 20 MG tablet TAKE 1 TABLET BY MOUTH EVERY DAY 90 tablet 1   • SITagliptin (JANUVIA) 100 MG tablet Take 100 mg by mouth Daily.     • SYNTHROID 125 MCG tablet      • vitamin D (ERGOCALCIFEROL) 26902 UNITS capsule capsule        No current facility-administered medications for this visit.          The following portions of the patient's history were reviewed and updated as appropriate: allergies, current medications, past family history, past medical history, past social history, past surgical history and problem list.    Review of Systems:   A 14 point review of systems was performed. All systems negative except pertinent positives/negative listed in HPI above    Physical Exam:   Vitals:    07/18/19 1040   Temp: 97.4 °F (36.3 °C)   TempSrc: Temporal   Weight: 88.4 kg (194 lb 14.4 oz)   Height: 156.8 cm (61.75\")       General: A and O x 3, ASA, NAD    SCLERA:    Normal    DENTITION:   Normal  Knee:  left    ALIGNMENT:     Neutral  ,   Patella tracks   midline    GAIT:     Nonantalgic    SKIN:    No abnormality    RANGE OF MOTION:   0  -  135   DEG    STRENGTH:   5 / 5    LIGAMENTS:    No varus / valgus instability.   Negative  Lachman.    MENISCUS:     Negative   Samir       DISTAL PULSES:    Paplable    DISTAL SENSATION :   Intact    LYMPHATICS:     No   lymphadenopathy    OTHER:          - No  effusion      - No crepitance with ROM      +Swelling and tenderness to palpation pes anserine bursa     Radiology:  Xrays 3views left knee (ap,lateral, sunrise) taken previously demonstratingmild joint space narrowing    Assessment/Plan:  Persistent left medial knee pain - likely pes anserine bursitis-  Bursa injected as below. RTO PRN  Large Joint Arthrocentesis: L knee  Date/Time: 7/18/2019 11:15 AM  Consent given by: patient  Site marked: site marked  Timeout: Immediately prior to procedure a time out was called to verify the " correct patient, procedure, equipment, support staff and site/side marked as required   Supporting Documentation  Indications: joint swelling and pain   Procedure Details  Location: knee - L knee  Preparation: Patient was prepped and draped in the usual sterile fashion  Needle gauge: 21 G.  Approach: medial  Medications administered: 2 mL lidocaine (cardiac); 80 mg methylPREDNISolone acetate 80 MG/ML  Patient tolerance: patient tolerated the procedure well with no immediate complications

## 2019-08-23 ENCOUNTER — OFFICE VISIT (OUTPATIENT)
Dept: ORTHOPEDIC SURGERY | Facility: CLINIC | Age: 59
End: 2019-08-23

## 2019-08-23 VITALS — BODY MASS INDEX: 37.38 KG/M2 | HEIGHT: 61 IN | WEIGHT: 198 LBS | TEMPERATURE: 97.2 F

## 2019-08-23 DIAGNOSIS — M25.562 CHRONIC PAIN OF LEFT KNEE: Primary | ICD-10-CM

## 2019-08-23 DIAGNOSIS — G89.29 CHRONIC PAIN OF LEFT KNEE: Primary | ICD-10-CM

## 2019-08-23 PROCEDURE — 99213 OFFICE O/P EST LOW 20 MIN: CPT | Performed by: ORTHOPAEDIC SURGERY

## 2019-08-23 NOTE — PROGRESS NOTES
Patient: Lynda Rueda  YOB: 1960 58 y.o. female  Medical Record Number: [id number]    Chief Complaints:   Chief Complaint   Patient presents with   • Left Knee - Follow-up       History of Present Illness:Lynda Rueda is a 58 y.o. female who presents for follow-up of severe left medial knee pain which is ongoing for at least 6 months now it is progressively worsened.  Intra-articular injections have not helped in the most recent past anserine injection also has not helped.  Pain is severe constant stabbing pain medially with weightbearing    Allergies:   Allergies   Allergen Reactions   • Celecoxib Hives   • Methocarbamol Hives     ROBAXIN    • Montelukast Hives   • Penicillins Hives   • Carbomer Other (See Comments)     UNKNOWN REACTION   • [place] Other (See Comments)     UNKNOWN PER PT   • Gemifloxacin    • Nickel Other (See Comments)     WHEN PT TOOK THE ALLERGY TEST NICKEL, ACRYLIC AND COBALT CAME UP AS ALLERGIES   • Tamiflu [Oseltamivir Phosphate] Rash       Medications:   Current Outpatient Medications   Medication Sig Dispense Refill   • citalopram (CeleXA) 20 MG tablet Take 1 tablet by mouth Daily. 90 tablet 0   • Empagliflozin (JARDIANCE) 25 MG tablet Take  by mouth Daily.     • fenofibrate 160 MG tablet TAKE 1 TABLET BY MOUTH EVERY DAY 90 tablet 1   • glipiZIDE (GLUCOTROL) 10 MG tablet Take 1 tablet (10 mg total) by mouth 2 (two) times a day. (Patient taking differently: Take 10 mg by mouth Daily.) 180 tablet 2   • glucose blood test strip OneTouch Ultra Blue In Vitro Strip; Patient Sig: OneTouch Ultra Blue In Vitro Strip ; 100; 0; 11-Nov-2013; Active     • ibuprofen (ADVIL,MOTRIN) 600 MG tablet Take 600 mg by mouth Every 6 (Six) Hours As Needed for Mild Pain .     • linagliptin (TRADJENTA) 5 MG tablet tablet Take 5 mg by mouth.     • metFORMIN ER (GLUCOPHAGE-XR) 500 MG 24 hr tablet Take 1,000 mg by mouth 2 (Two) Times a Day.     • metoprolol succinate XL (TOPROL-XL) 50 MG 24 hr  "tablet TAKE 1 TABLET BY MOUTH EVERY DAY**NEEDS TO MAKE APPT** 90 tablet 0   • rosuvastatin (CRESTOR) 20 MG tablet TAKE 1 TABLET BY MOUTH EVERY DAY 90 tablet 1   • SITagliptin (JANUVIA) 100 MG tablet Take 100 mg by mouth Daily.     • SYNTHROID 125 MCG tablet      • vitamin D (ERGOCALCIFEROL) 73875 UNITS capsule capsule      • acetaminophen (TYLENOL) 500 MG tablet Take 1,000 mg by mouth Every 6 (Six) Hours As Needed for Mild Pain .     • pantoprazole (PROTONIX) 40 MG EC tablet Take 1 tablet by mouth Daily. 90 tablet 1     No current facility-administered medications for this visit.          The following portions of the patient's history were reviewed and updated as appropriate: allergies, current medications, past family history, past medical history, past social history, past surgical history and problem list.    Review of Systems:   A 14 point review of systems was performed. All systems negative except pertinent positives/negative listed in HPI above    Physical Exam:   Vitals:    08/23/19 1351   Temp: 97.2 °F (36.2 °C)   Weight: 89.8 kg (198 lb)   Height: 154.9 cm (61\")       General: A and O x 3, ASA, NAD    SCLERA:    Normal    DENTITION:   Normal  Knee:  left    ALIGNMENT:     Neutral  ,   Patella tracks   midline    GAIT:    Antalgic    SKIN:    No abnormality    RANGE OF MOTION:   Painful flexion    STRENGTH:   5 / 5    LIGAMENTS:    No varus / valgus instability.   Negative  Lachman.    MENISCUS:     Positive  medial   Samir       DISTAL PULSES:    Paplable    DISTAL SENSATION :   Intact    LYMPHATICS:     No   lymphadenopathy    OTHER:          - Positive  effusion      - No crepitance with ROM       Radiology:  Xrays 3views left knee  (ap,lateral, sunrise) taken previously demonstrating minimal arthritic findings      Assessment/Plan:  Persistent severe left knee pain.  She has not responded to intra-articular gel or steroid injections or peds anserine bursa injections.  The pain is still severe and " constant and she is limited in basic activities.  I am concerned she could have a meniscus tear or a medial subchondral stress fracture.  I am going to get an MRI and she will call back for results

## 2019-08-28 RX ORDER — ROSUVASTATIN CALCIUM 20 MG/1
TABLET, COATED ORAL
Qty: 30 TABLET | Refills: 0 | Status: SHIPPED | OUTPATIENT
Start: 2019-08-28

## 2019-09-09 ENCOUNTER — TELEPHONE (OUTPATIENT)
Dept: ORTHOPEDIC SURGERY | Facility: CLINIC | Age: 59
End: 2019-09-09

## 2019-09-09 DIAGNOSIS — M25.562 CHRONIC PAIN OF LEFT KNEE: Primary | ICD-10-CM

## 2019-09-09 DIAGNOSIS — G89.29 CHRONIC PAIN OF LEFT KNEE: Primary | ICD-10-CM

## 2019-09-10 NOTE — TELEPHONE ENCOUNTER
Please let patient know that the MRI of her knee shows some cartilage tears but secondary to significant degenerative changes of the knee.  Would recommend repeat cortisone injection and physical therapy if still having pain per Dr. Bird

## 2019-09-24 RX ORDER — ROSUVASTATIN CALCIUM 20 MG/1
TABLET, COATED ORAL
Qty: 30 TABLET | Refills: 0 | OUTPATIENT
Start: 2019-09-24

## 2019-10-01 ENCOUNTER — HOSPITAL ENCOUNTER (OUTPATIENT)
Dept: PHYSICAL THERAPY | Facility: HOSPITAL | Age: 59
Setting detail: THERAPIES SERIES
Discharge: HOME | End: 2019-10-01

## 2019-10-01 DIAGNOSIS — G89.29 CHRONIC PAIN OF LEFT KNEE: Primary | ICD-10-CM

## 2019-10-01 DIAGNOSIS — M25.562 CHRONIC PAIN OF LEFT KNEE: Primary | ICD-10-CM

## 2019-10-01 PROCEDURE — 97110 THERAPEUTIC EXERCISES: CPT

## 2019-10-01 PROCEDURE — 97161 PT EVAL LOW COMPLEX 20 MIN: CPT

## 2019-10-01 NOTE — THERAPY EVALUATION
Outpatient Physical Therapy Ortho Initial Evaluation  [facility]     Patient Name: Lynda Rueda  : 1960  MRN: [id number]  Today's Date: 10/1/2019      Visit Date: 10/01/2019    Patient Active Problem List   Diagnosis   • Epigastric pain   • Right upper quadrant pain   • Allergic conjunctivitis   • Eczema   • Non-insulin dependent type 2 diabetes mellitus (CMS/HCC)   • Diarrhea   • Dysuria   • Excessive sweating   • Simple obesity   • Steatosis of liver   • Menopausal flushing   • Hyperlipidemia   • Hypothyroidism   • Arthralgia of hip   • Morbid exogenous obesity (CMS/HCC)   • Peritoneal cyst   • Hematuria   • Goiter   • Vitamin D deficiency   • Candidiasis of vagina   • Otitis media of left ear   • Hip pain, left   • Acquired hypothyroidism   • Benign essential hypertension   • Chronic fatigue syndrome   • Intolerant of heat   • Multiple-type hyperlipidemia   • Hypocalcemia   • Insomnia   • Menopausal syndrome   • Thyroid nodule   • Controlled type 2 diabetes mellitus without complication (CMS/HCC)   • Chest pressure   • EKG, abnormal   • Chest pain   • Murmur   • Type 2 diabetes mellitus without complication, without long-term current use of insulin (CMS/HCC)   • Essential hypertension   • Type 2 diabetes mellitus, uncontrolled (CMS/HCC)   • Gastroesophageal reflux disease with esophagitis   • Controlled substance agreement signed   • Health care maintenance   • Vitamin B12 deficiency   • Influenza   • Lumbar radiculopathy        Past Medical History:   Diagnosis Date   • Anxiety    • Diabetes (CMS/HCC)    • GERD (gastroesophageal reflux disease)    • H/O colonoscopy 2012   • Hyperlipidemia    • Hypertension    • Low back pain    • PONV (postoperative nausea and vomiting)    • Thyroid disease         Past Surgical History:   Procedure Laterality Date   • APPENDECTOMY     • BREAST BIOPSY     •  SECTION     • CHOLECYSTECTOMY     • COLONOSCOPY N/A 6/15/2018    Procedure:  COLONOSCOPY INTO CECUM AND T.I. WITH BIOPSIES AND COLD BIOPSY POLYPECTOMY;  Surgeon: Sunil Cardona MD;  Location: [facility] ENDOSCOPY;  Service: Gastroenterology   • DILATATION AND CURETTAGE     • HYSTERECTOMY  2007    OVARIAN MASS/SMALL INTESTINE   • KNEE SURGERY Left 2013   • OVARIAN CYST REMOVAL  2004   • THYROIDECTOMY  07/2016       Visit Dx:     ICD-10-CM ICD-9-CM   1. Chronic pain of left knee M25.562 719.46    G89.29 338.29         Patient History     Row Name 10/01/19 1600             History    Chief Complaint  Pain  -LB      Type of Pain  Knee pain  -LB      Date Current Problem(s) Began  02/01/19  -LB      Brief Description of Current Complaint  Pt reports L knee pain beginning in Feb/March. She had episode of bending forward and back hurting. She felt pain in L knee at this time as well but is unsure if the 2 are related. She has had back pain chronically. She has chronic L knee pain as well with hx of many sports. Pt goes down stairs sideways and has steps all over home (3 stories). Pt reports she feels like she can't walk correctly. Frequent popping in L knee.  -LB      Previous treatment for THIS PROBLEM  Injections  -LB      Patient/Caregiver Goals  Return to prior level of function;Improve mobility;Improve strength;Know what to do to help the symptoms  -LB      Occupation/sports/leisure activities  Pt works as a  in a school.  -LB      Patient seeing anyone else for problem(s)?  yes  -LB      How has patient tried to help current problem?  injections in bursa and joint line did not help  -LB      What clinical tests have you had for this problem?  MRI  -LB      Results of Clinical Tests  degenerative changes  -LB      History of Previous Related Injuries  R knee meniscal surgery 2013  -LB         Pain     Pain Location  Knee  -LB      Pain at Present  4  -LB      Pain at Best  4  -LB      Pain at Worst  7  -LB      Pain Frequency  Constant/continuous  -LB      Pain Description   Aching;Burning;Stabbing  -LB      What Performance Factors Make the Current Problem(s) WORSE?  stairs, standing/walking  -LB      What Performance Factors Make the Current Problem(s) BETTER?  Biofreeze  -LB      Pain Comments  It hurts on the inside.  -LB      Tolerance Time- Standing  pain with standing  -LB      Tolerance Time- Sitting  stiff after 30 minutes  -LB      Tolerance Time- Walking  pain after 5 minutes  -LB      Tolerance Time- Lying  difficulty getting comfortable  -LB      Is your sleep disturbed?  Yes  -LB      What position do you sleep in?  Right sidelying;Left sidelying  -LB      Difficulties at work?  Pain after sitting/with stairs  -LB      Difficulties with ADL's?  Pain with bending, lifting.  -LB      Difficulties with recreational activities?  Unable to walk for exercise.  -LB         Fall Risk Assessment    Any falls in the past year:  No  -LB         Services    Prior Rehab/Home Health Experiences  No  -LB      Are you currently receiving Home Health services  No  -LB      Do you plan to receive Home Health services in the near future  No  -LB         Daily Activities    Primary Language  English  -LB      Pt Participated in POC and Goals  Yes  -LB         Safety    Are you being hurt, hit, or frightened by anyone at home or in your life?  No  -LB      Are you being neglected by a caregiver  No  -LB        User Key  (r) = Recorded By, (t) = Taken By, (c) = Cosigned By    Initials Name Provider Type    Nadya Jeffery PT Physical Therapist          PT Ortho     Row Name 10/01/19 1600       Subjective Comments    Subjective Comments  It pops alot. I had 2 injections and it doesn't help.  -LB       Subjective Pain    Able to rate subjective pain?  yes  -LB    Pre-Treatment Pain Level  6  -LB       Posture/Observations    Posture/Observations Comments  medial/inferior edema L knee, genu varum  -LB       Myotomal Screen- Lower Quarter Clearing    Hip flexion (L2)  Bilateral:;4 (Good)  -LB     Knee extension (L3)  Left:;4- (Good -);Right:;5 (Normal) pain on L  -LB    Ankle DF (L4)  Bilateral:;5 (Normal)  -LB    Ankle PF (S1)  Bilateral:;4 (Good)  -LB    Knee flexion (S2)  Left:;4- (Good -);Right:;5 (Normal)  -LB       Knee Palpation    Patella  Left:;Tender  -LB    Pes Anserine Bursa  Left:;Tender;Swollen  -LB    Medial Joint Line  Left:;Tender  -LB       Patellar Accessory Motions    Superior glide  Left:;Hypomobile  -LB    Inferior glide  Hypomobile  -LB    Medial glide  Hypomobile  -LB    Lateral glide  Hypomobile  -LB       Knee Special Tests    Anterior drawer (ACL lesion)  Negative  -LB    Posterior sag sign (PCL lesion)  Negative  -LB    Valgus stress (MCL lesion)  Negative  -LB    Varus stress (LCL lesion)  Negative  -LB    Samir’s test (meniscal lesion)  Left:;Positive lateral positive  -LB       General ROM    GENERAL ROM COMMENTS  L knee 0-112 deg; R knee 0-124 deg  -LB       MMT (Manual Muscle Testing)    General MMT Comments  B hip abduction 4-/5  -LB       Sensation    Sensation WNL?  WFL  -LB       Lower Extremity Flexibility    Hamstrings  Bilateral:;Mildly limited  -LB    Hip External Rotators  Bilateral:;Mildly limited  -LB    Hip Internal Rotators  WNL  -LB    Gastrocnemius  WNL  -LB       Gait/Stairs Assessment/Training    [place] Level (Gait)  independent  -LB    Distance in Feet (Gait)  50  -LB    Pattern (Gait)  step-through  -LB    Deviations/Abnormal Patterns (Gait)  antalgic;rudy decreased;gait speed decreased;stride length decreased  -LB    Comment (Gait/Stairs)  step to laterally for stair negotiation  -LB      User Key  (r) = Recorded By, (t) = Taken By, (c) = Cosigned By    Initials Name Provider Type    Nadya Jeffery PT Physical Therapist                      Therapy Education  Education Details: issued HEP, discussed role of hip strengthening with stair negotiation, footwear  Given: HEP, Symptoms/condition management, Mobility training  Program: New  How  Provided: Demonstration, Written, Verbal  Provided to: Patient  Level of Understanding: Teach back education performed, Verbalized, Demonstrated     PT OP Goals     Row Name 10/01/19 1600          PT Short Term Goals    STG Date to Achieve  10/15/19  -LB     STG 1  Pt will demonstrate understanding and compliance with initial HEP.  -LB     STG 1 Progress  New  -LB     STG 2  Pt will report 50% improvement in condition at close of work day with use of change of position and AROM during work day.  -LB     STG 2 Progress  New  -LB     STG 3  Pt will demonstrate improved L knee flexion from 112 deg to 116 deg or better.  -LB     STG 3 Progress  New  -LB        Long Term Goals    LTG Date to Achieve  10/31/19  -LB     LTG 1  Pt will report reduced overall disability as evidenced by KOS disability score from 41% to 30% or less.  -LB     LTG 1 Progress  New  -LB     LTG 2  Pt will demonstrate 120 deg AROM flexion of L knee.   -LB     LTG 2 Progress  New  -LB     LTG 3  Pt will demonstrate normal B patellar mobility.   -LB     LTG 3 Progress  New  -LB        Time Calculation    PT Goal Re-Cert Due Date  12/30/19  -LB       User Key  (r) = Recorded By, (t) = Taken By, (c) = Cosigned By    Initials Name Provider Type    Nadya Jeffery, PT Physical Therapist          PT Assessment/Plan     Row Name 10/01/19 1653          PT Assessment    Functional Limitations  Limitation in home management;Performance in leisure activities;Performance in work activities;Impaired gait;Limitations in community activities;Impaired locomotion;Limitations in functional capacity and performance  -LB     Impairments  Edema;Impaired flexibility;Locomotion;Poor body mechanics;Pain;Range of motion;Joint mobility;Gait;Muscle strength  -LB     Assessment Comments  Pt is 58 y.o. female referred to outpatient physical therapy for evaluation and treatment of  evolving  left described as an aching/burning and occasionally stabbing pain medially on L knee  that has worsened over last 6 months.  Patient presents with dec L knee AROM, dec L knee and hip strength, impaired gait, and inability to negotiate stairs reciprocally. PMHx consistent with chronic knee/LBP, DM, multiple knee injections. Personal factors affecting her care include chronic nature of symptoms, sedentary lifestyle, degenerative changes. Pt demonstrates signs and symptoms  consistent with referring diagnosis.  Pt scored 41% disability on the KOS ADL. Pt is limited in their ability to participate in walking for exercise, sleeping, stair negotiation. She will benefit from continued skilled PT services to address functional deficits. Thank you for this referral.  -LB     Please refer to paper survey for additional self-reported information  Yes  -LB     Rehab Potential  Good  -LB     Patient/caregiver participated in establishment of treatment plan and goals  Yes  -LB     Patient would benefit from skilled therapy intervention  Yes  -LB        PT Plan    PT Frequency  2x/week  -LB     Predicted Duration of Therapy Intervention (Therapy Eval)  4 weeks   -LB     Planned CPT's?  PT EVAL LOW COMPLEXITY: 08710;PT THER PROC EA 15 MIN: 96224;PT MANUAL THERAPY EA 15 MIN: 65526;PT GAIT TRAINING EA 15 MIN: 13742;PT HOT OR COLD PACK TREAT MCARE;PT HOT/COLD PACK WC NONMCARE: 96000;PT RE-EVAL: 98608;PT THER ACT EA 15 MIN: 66532;PT NEUROMUSC RE-EDUCATION EA 15 MIN: 40789;PT ELECTRICAL STIM UNATTEND: ;PT ULTRASOUND EA 15 MIN: 99723  -LB     PT Plan Comments  Assess tolerance to HEP, discuss STS mechanics and glute activation, consider CKC, gentle mobility, patellar mobs, L knee stabilization  -LB       User Key  (r) = Recorded By, (t) = Taken By, (c) = Cosigned By    Initials Name Provider Type    Nadya Jeffery, TYS[place] Physical Therapist            OP Exercises     Row Name 10/01/19 1600             Subjective Comments    Subjective Comments  It pops alot. I had 2 injections and it doesn't help.  -LB          Subjective Pain    Able to rate subjective pain?  yes  -LB      Pre-Treatment Pain Level  6  -LB         Total Minutes    67894 - PT Therapeutic Exercise Minutes  15  -LB         Exercise 1    Exercise Name 1  QS  -LB      Reps 1  10  -LB      Time 1  5  -LB         Exercise 2    Exercise Name 2  SAQ with adduction  -LB      Reps 2  10  -LB      Time 2  5  -LB         Exercise 3    Exercise Name 3  glute set  -LB      Reps 3  10  -LB      Time 3  5  -LB         Exercise 4    Exercise Name 4  seated heel slide  -LB      Reps 4  10  -LB         Exercise 5    Exercise Name 5  hip abduction HL  -LB      Reps 5  10  -LB      Additional Comments  RTB  -LB        User Key  (r) = Recorded By, (t) = Taken By, (c) = Cosigned By    Initials Name Provider Type    LB Nadya Wall, PT Physical Therapist                        Outcome Measure Options: Knee Outcome Score- ADL  Knee Outcome Score  Knee Outcome Score Comments: 41% disability       Time Calculation:     Start Time: 1600  Stop Time: 1645  Time Calculation (min): 45 min  Total Timed Code Minutes- PT: 15 minute(s)     Therapy Charges for Today     Code Description Service Date Service Provider Modifiers Qty    12924129539 HC PT EVAL LOW COMPLEXITY 2 10/1/2019 Nadya Wall, PT GP 1    76149342272 HC PT THER PROC EA 15 MIN 10/1/2019 Nadya Wall, PT GP 1          PT G-Codes  Outcome Measure Options: Knee Outcome Score- ADL         Nadya Wall PT  10/1/2019

## 2019-10-04 ENCOUNTER — APPOINTMENT (OUTPATIENT)
Dept: PHYSICAL THERAPY | Facility: HOSPITAL | Age: 59
End: 2019-10-04

## 2019-10-22 ENCOUNTER — HOSPITAL ENCOUNTER (OUTPATIENT)
Dept: PHYSICAL THERAPY | Facility: HOSPITAL | Age: 59
Setting detail: THERAPIES SERIES
Discharge: HOME | End: 2019-10-22

## 2019-10-22 DIAGNOSIS — G89.29 CHRONIC PAIN OF LEFT KNEE: Primary | ICD-10-CM

## 2019-10-22 DIAGNOSIS — M25.562 CHRONIC PAIN OF LEFT KNEE: Primary | ICD-10-CM

## 2019-10-22 PROCEDURE — 97140 MANUAL THERAPY 1/> REGIONS: CPT

## 2019-10-22 PROCEDURE — 97110 THERAPEUTIC EXERCISES: CPT

## 2019-10-22 NOTE — THERAPY TREATMENT NOTE
Outpatient Physical Therapy Ortho Treatment Note  [facility]     Patient Name: Lynda Rueda  : 1960  MRN: [id number]  Today's Date: 10/22/2019      Visit Date: 10/22/2019    Visit Dx:    ICD-10-CM ICD-9-CM   1. Chronic pain of left knee M25.562 719.46    G89.29 338.29       Patient Active Problem List   Diagnosis   • Epigastric pain   • Right upper quadrant pain   • Allergic conjunctivitis   • Eczema   • Non-insulin dependent type 2 diabetes mellitus (CMS/HCC)   • Diarrhea   • Dysuria   • Excessive sweating   • Simple obesity   • Steatosis of liver   • Menopausal flushing   • Hyperlipidemia   • Hypothyroidism   • Arthralgia of hip   • Morbid exogenous obesity (CMS/HCC)   • Peritoneal cyst   • Hematuria   • Goiter   • Vitamin D deficiency   • Candidiasis of vagina   • Otitis media of left ear   • Hip pain, left   • Acquired hypothyroidism   • Benign essential hypertension   • Chronic fatigue syndrome   • Intolerant of heat   • Multiple-type hyperlipidemia   • Hypocalcemia   • Insomnia   • Menopausal syndrome   • Thyroid nodule   • Controlled type 2 diabetes mellitus without complication (CMS/HCC)   • Chest pressure   • EKG, abnormal   • Chest pain   • Murmur   • Type 2 diabetes mellitus without complication, without long-term current use of insulin (CMS/HCC)   • Essential hypertension   • Type 2 diabetes mellitus, uncontrolled (CMS/HCC)   • Gastroesophageal reflux disease with esophagitis   • Controlled substance agreement signed   • Health care maintenance   • Vitamin B12 deficiency   • Influenza   • Lumbar radiculopathy        Past Medical History:   Diagnosis Date   • Anxiety    • Diabetes (CMS/HCC)    • GERD (gastroesophageal reflux disease)    • H/O colonoscopy 2012   • Hyperlipidemia    • Hypertension    • Low back pain    • PONV (postoperative nausea and vomiting)    • Thyroid disease         Past Surgical History:   Procedure Laterality Date   • APPENDECTOMY     • BREAST BIOPSY     •   SECTION     • CHOLECYSTECTOMY     • COLONOSCOPY N/A 6/15/2018    Procedure: COLONOSCOPY INTO CECUM AND T.I. WITH BIOPSIES AND COLD BIOPSY POLYPECTOMY;  Surgeon: Sunil Cardona MD;  Location: [facility] ENDOSCOPY;  Service: Gastroenterology   • DILATATION AND CURETTAGE     • HYSTERECTOMY      OVARIAN MASS/SMALL INTESTINE   • KNEE SURGERY Left    • OVARIAN CYST REMOVAL     • THYROIDECTOMY  2016                       PT Assessment/Plan     Row Name 10/22/19 1659          PT Assessment    Assessment Comments  Pt returns for first follow up since evaluation due to mother passing away last week which delayed return to therapy. Pt reporting inc L knee pain secondary to tripping over rug at  home and catching herself hard on LLE despite not falling. She demonstrates limited tolerance to L knee strengthening exercises with pain in multiple exercises. Focused on gentle stability, hip strengthening, and body mechanics. Pt planning to call MD to schedule cortisone injection when able.   -LB        PT Plan    PT Plan Comments  Assess tolerance to last session, K taping, continue CKC gentle strengthening.   -LB       User Key  (r) = Recorded By, (t) = Taken By, (c) = Cosigned By    Initials Name Provider Type    LB Nadya Wall, PT Physical Therapist            OP Exercises     Row Name 10/22/19 1600 10/22/19 1500          Subjective Comments    Subjective Comments  I haven't done my exercises. My week has been stressful. My mother passed away. I did slip and caught myself with my L knee.  -LB  --        Subjective Pain    Able to rate subjective pain?  yes  -LB  --     Pre-Treatment Pain Level  7  -LB  --        Total Minutes    56784 - PT Therapeutic Exercise Minutes  30  -LB  --     54536 - PT Manual Therapy Minutes  --  8  -LB        Exercise 1    Exercise Name 1  QS  -LB  --     Reps 1  10  -LB  --     Time 1  5  -LB  --        Exercise 2    Exercise Name 2  SAQ with adduction  -LB  --      Reps 2  10  -LB  --     Time 2  5  -LB  --        Exercise 3    Exercise Name 3  glute set  -LB  --     Reps 3  10  -LB  --     Time 3  5  -LB  --        Exercise 4    Exercise Name 4  DKTC on green ball  -LB  --     Reps 4  10  -LB  --     Additional Comments  for L knee flexion  -LB  --        Exercise 5    Exercise Name 5  hip abduction HL  -LB  --     Reps 5  15  -LB  --     Additional Comments  RTB  -LB  --        Exercise 6    Exercise Name 6  Nustep Level 3  -LB  --     Time 6  5 minutes  -LB  --     Additional Comments  UE/LE slow mid range  -LB  --        Exercise 7    Exercise Name 7  seated HS stretch  -LB  --     Reps 7  3  -LB  --     Time 7  20  -LB  --        Exercise 8    Exercise Name 8  seated gastroc stretch with strap  -LB  --     Reps 8  3  -LB  --     Time 8  20  -LB  --        Exercise 9    Exercise Name 9  hip adduction + PPT  -LB  --     Reps 9  10  -LB  --     Time 9  5  -LB  --        Exercise 10    Exercise Name 10  reviewed STS mechanics  -LB  --     Time 10  5 minutes  -LB  --        Exercise 11    Exercise Name 11  HR at ballet bar  -LB  --     Reps 11  10  -LB  --       User Key  (r) = Recorded By, (t) = Taken By, (c) = Cosigned By    Initials Name Provider Type    LB Nadya Wall, PT Physical Therapist                      Manual Rx (last 36 hours)      Manual Treatments     Row Name 10/22/19 1500             Total Minutes    80804 - PT Manual Therapy Minutes  8  -LB         Manual Rx 1    Manual Rx 1 Location  K tape for patellar stabilization  -LB         Manual Rx 2    Manual Rx 2 Location  gentle patellar joint mobs  -LB        User Key  (r) = Recorded By, (t) = Taken By, (c) = Cosigned By    Initials Name Provider Type    LB Nadya Wall PT Physical Therapist          PT OP Goals     Row Name 10/22/19 1600          PT Short Term Goals    STG Date to Achieve  10/15/19  -LB     STG 1  Pt will demonstrate understanding and compliance with initial HEP.  -LB     STG 1 Progress   Ongoing  -LB     STG 2  Pt will report 50% improvement in condition at close of work day with use of change of position and AROM during work day.  -LB     STG 2 Progress  Ongoing  -LB     STG 3  Pt will demonstrate improved L knee flexion from 112 deg to 116 deg or better.  -LB     STG 3 Progress  Ongoing  -LB        Long Term Goals    LTG Date to Achieve  10/31/19  -LB     LTG 1  Pt will report reduced overall disability as evidenced by KOS disability score from 41% to 30% or less.  -LB     LTG 1 Progress  New  -LB     LTG 2  Pt will demonstrate 120 deg AROM flexion of L knee.   -LB     LTG 2 Progress  New  -LB     LTG 3  Pt will demonstrate normal B patellar mobility.   -LB     LTG 3 Progress  New  -LB       User Key  (r) = Recorded By, (t) = Taken By, (c) = Cosigned By    Initials Name Provider Type    Nadya Jeffery, PT Physical Therapist          Therapy Education  Given: Symptoms/condition management, HEP  Program: Reinforced  How Provided: Verbal  Provided to: Patient  Level of Understanding: Teach back education performed, Verbalized, Demonstrated              Time Calculation:   Start Time: 1615  Stop Time: 1700  Time Calculation (min): 45 min  Total Timed Code Minutes- PT: 40 minute(s)  Therapy Charges for Today     Code Description Service Date Service Provider Modifiers Qty    43614300336 HC PT THER PROC EA 15 MIN 10/22/2019 Nadya Wall, PT GP 2    64332106469 HC PT MANUAL THERAPY EA 15 MIN 10/22/2019 Nadya Wall, PT GP 1                    Nadya Wall PT  10/22/2019

## 2019-10-24 ENCOUNTER — APPOINTMENT (OUTPATIENT)
Dept: PHYSICAL THERAPY | Facility: HOSPITAL | Age: 59
End: 2019-10-24

## 2019-10-24 ENCOUNTER — CLINICAL SUPPORT (OUTPATIENT)
Dept: ORTHOPEDIC SURGERY | Facility: CLINIC | Age: 59
End: 2019-10-24

## 2019-10-24 VITALS — BODY MASS INDEX: 37.38 KG/M2 | WEIGHT: 198 LBS | HEIGHT: 61 IN | TEMPERATURE: 98.4 F

## 2019-10-24 DIAGNOSIS — M25.562 LEFT KNEE PAIN, UNSPECIFIED CHRONICITY: Primary | ICD-10-CM

## 2019-10-24 PROCEDURE — 20610 DRAIN/INJ JOINT/BURSA W/O US: CPT | Performed by: ORTHOPAEDIC SURGERY

## 2019-10-24 PROCEDURE — 99212 OFFICE O/P EST SF 10 MIN: CPT | Performed by: ORTHOPAEDIC SURGERY

## 2019-10-24 RX ORDER — METHYLPREDNISOLONE ACETATE 80 MG/ML
80 INJECTION, SUSPENSION INTRA-ARTICULAR; INTRALESIONAL; INTRAMUSCULAR; SOFT TISSUE
Status: COMPLETED | OUTPATIENT
Start: 2019-10-24 | End: 2019-10-24

## 2019-10-24 RX ADMIN — METHYLPREDNISOLONE ACETATE 80 MG: 80 INJECTION, SUSPENSION INTRA-ARTICULAR; INTRALESIONAL; INTRAMUSCULAR; SOFT TISSUE at 16:04

## 2019-10-24 NOTE — PROGRESS NOTES
10/24/2019    Lynda Rueda is here today for worsening knee pain. Pt has undergone injection of the knee in the past with good resolution of symptoms but only for a month or 2.  The pain is fairly severe she has a popping catching on the medial joint.  She is had 2 previous injections.. Pt is requesting a repeat injection.     Knee:  left    ALIGNMENT:     Neutral  ,   Patella tracks   midline    GAIT:    Antalgic    SKIN:    No abnormality    RANGE OF MOTION:   Painful flexion    STRENGTH:   5 / 5    LIGAMENTS:    No varus / valgus instability.   Negative  Lachman.    MENISCUS:     Positive  medial   Samir       DISTAL PULSES:    Paplable    DISTAL SENSATION :   Intact    LYMPHATICS:     No   lymphadenopathy    OTHER:          - Positive  effusion      - No crepitance with ROM       KNEE Injection Procedure Note:    Large Joint Arthrocentesis: L knee  Date/Time: 10/24/2019 4:04 PM  Consent given by: patient  Site marked: site marked  Timeout: Immediately prior to procedure a time out was called to verify the correct patient, procedure, equipment, support staff and site/side marked as required   Supporting Documentation  Indications: pain and joint swelling   Procedure Details  Location: knee - L knee  Preparation: Patient was prepped and draped in the usual sterile fashion  Needle size: 22 G  Approach: anterolateral  Medications administered: 2 mL lidocaine (cardiac); 80 mg methylPREDNISolone acetate 80 MG/ML  Patient tolerance: patient tolerated the procedure well with no immediate complications          At the conclusion of the injection I discussed the importance of continued quad strengthening exercises on a daily basis. I will see the patient back if the symptoms should fail to improve or worsen.    I again reviewed today's x-rays and MRI.  She has no significant narrowing on x-ray she has a meniscus tear on MRI as well as some mild medial chondromalacia more significant in the patellofemoral joint.   This is now her third injection and they have helped for about a month to 2.  If she has persistent symptoms despite this it may be worth considering sending her to Dr. Sindi Jenkins for the possibility of arthroscopy.  She does understand that arthroscopy in the setting of arthritis could result in progression of the disease process however injections are not giving her lasting relief and I am not at this point ready to consider knee replacement with her.  We injected the knee again as above today if not better she will call back and would consider referral to Dr. Jenkins.    Katherine Ruth MA  10/24/2019

## 2019-10-29 ENCOUNTER — HOSPITAL ENCOUNTER (OUTPATIENT)
Dept: PHYSICAL THERAPY | Facility: HOSPITAL | Age: 59
Setting detail: THERAPIES SERIES
Discharge: HOME | End: 2019-10-29

## 2019-10-29 DIAGNOSIS — G89.29 CHRONIC PAIN OF LEFT KNEE: Primary | ICD-10-CM

## 2019-10-29 DIAGNOSIS — M25.562 CHRONIC PAIN OF LEFT KNEE: Primary | ICD-10-CM

## 2019-10-29 PROCEDURE — 97110 THERAPEUTIC EXERCISES: CPT

## 2019-10-29 NOTE — THERAPY TREATMENT NOTE
Outpatient Physical Therapy Ortho Treatment Note  [facility]     Patient Name: Lynda Rueda  : 1960  MRN: [id number]  Today's Date: 10/29/2019      Visit Date: 10/29/2019    Visit Dx:    ICD-10-CM ICD-9-CM   1. Chronic pain of left knee M25.562 719.46    G89.29 338.29       Patient Active Problem List   Diagnosis   • Epigastric pain   • Right upper quadrant pain   • Allergic conjunctivitis   • Eczema   • Non-insulin dependent type 2 diabetes mellitus (CMS/HCC)   • Diarrhea   • Dysuria   • Excessive sweating   • Simple obesity   • Steatosis of liver   • Menopausal flushing   • Hyperlipidemia   • Hypothyroidism   • Arthralgia of hip   • Morbid exogenous obesity (CMS/HCC)   • Peritoneal cyst   • Hematuria   • Goiter   • Vitamin D deficiency   • Candidiasis of vagina   • Otitis media of left ear   • Hip pain, left   • Acquired hypothyroidism   • Benign essential hypertension   • Chronic fatigue syndrome   • Intolerant of heat   • Multiple-type hyperlipidemia   • Hypocalcemia   • Insomnia   • Menopausal syndrome   • Thyroid nodule   • Controlled type 2 diabetes mellitus without complication (CMS/HCC)   • Chest pressure   • EKG, abnormal   • Chest pain   • Murmur   • Type 2 diabetes mellitus without complication, without long-term current use of insulin (CMS/HCC)   • Essential hypertension   • Type 2 diabetes mellitus, uncontrolled (CMS/HCC)   • Gastroesophageal reflux disease with esophagitis   • Controlled substance agreement signed   • Health care maintenance   • Vitamin B12 deficiency   • Influenza   • Lumbar radiculopathy        Past Medical History:   Diagnosis Date   • Anxiety    • Diabetes (CMS/HCC)    • GERD (gastroesophageal reflux disease)    • H/O colonoscopy 2012   • Hyperlipidemia    • Hypertension    • Low back pain    • PONV (postoperative nausea and vomiting)    • Thyroid disease         Past Surgical History:   Procedure Laterality Date   • APPENDECTOMY     • BREAST BIOPSY     •  " SECTION     • CHOLECYSTECTOMY     • COLONOSCOPY N/A 6/15/2018    Procedure: COLONOSCOPY INTO CECUM AND T.I. WITH BIOPSIES AND COLD BIOPSY POLYPECTOMY;  Surgeon: Sunil Cardona MD;  Location: [facility] ENDOSCOPY;  Service: Gastroenterology   • DILATATION AND CURETTAGE     • HYSTERECTOMY      OVARIAN MASS/SMALL INTESTINE   • KNEE SURGERY Left    • OVARIAN CYST REMOVAL  2004   • THYROIDECTOMY  2016                       PT Assessment/Plan     Row Name 10/29/19 1650          PT Assessment    Assessment Comments  Pt reporting good tolerance to recent injection and able to tolerate some progression of strengthening activities today. Pt has difficulty in L SLS to perform HS curl as she \"feels like it is going to buckle.\" Worked on quad activation to improve stability with RLE HS curl. Continued POC for L knee stabilization and strengthening. Plan to progress slowly as pt is easily aggravated.   -LB        PT Plan    PT Plan Comments  Progress LE strengthening and stabilization exercises slowly. Continue to educate pt in joint protection strategies, proper footwear, and body mechanics.   -LB       User Key  (r) = Recorded By, (t) = Taken By, (c) = Cosigned By    Initials Name Provider Type    LB Nadya Wall, PT Physical Therapist            OP Exercises     Row Name 10/29/19 1600             Subjective Comments    Subjective Comments  I got an injection and felt much better but then I ran my knee into the shelf at [employer] and really hurt it. It recovered some.  -LB         Subjective Pain    Able to rate subjective pain?  yes  -LB      Pre-Treatment Pain Level  2  -LB         Total Minutes    54559 - PT Therapeutic Exercise Minutes  30  -LB         Exercise 1    Exercise Name 1  QS  -LB      Reps 1  10  -LB      Time 1  5  -LB      Additional Comments  seated into blue ball  -LB         Exercise 2    Exercise Name 2  SAQ with adduction  -LB      Reps 2  10  -LB      Time 2  5  -LB         Exercise " 3    Exercise Name 3  glute set  -LB      Reps 3  10  -LB      Time 3  5  -LB         Exercise 4    Exercise Name 4  --  -LB      Reps 4  --  -LB         Exercise 5    Exercise Name 5  hip abduction HL  -LB      Reps 5  15  -LB      Additional Comments  GTB  -LB         Exercise 6    Exercise Name 6  Nustep Level 3  -LB      Time 6  5 minutes  -LB      Additional Comments  UE/LE slow mid range  -LB         Exercise 7    Exercise Name 7  seated HS stretch  -LB      Reps 7  3  -LB      Time 7  20  -LB         Exercise 8    Exercise Name 8  seated gastroc stretch with strap  -LB      Reps 8  3  -LB      Time 8  20  -LB         Exercise 9    Exercise Name 9  hip adduction + PPT  -LB      Reps 9  15  -LB      Time 9  5  -LB         Exercise 10    Exercise Name 10  seated HS curl  -LB      Reps 10  12  -LB      Time 10  --  -LB      Additional Comments  RTB each  -LB         Exercise 11    Exercise Name 11  HR at ballet bar  -LB      Reps 11  10  -LB         Exercise 12    Exercise Name 12  standing HS curl  -LB      Reps 12  10  -LB      Additional Comments  each; limited by LLE stance  -LB         Exercise 13    Exercise Name 13  LAQ with adduction  -LB      Reps 13  12  -LB      Additional Comments  B  -LB        User Key  (r) = Recorded By, (t) = Taken By, (c) = Cosigned By    Initials Name Provider Type    Nadya Jeffery, PT Physical Therapist                       PT OP Goals     Row Name 10/29/19 1600          PT Short Term Goals    STG Date to Achieve  10/15/19  -LB     STG 1  Pt will demonstrate understanding and compliance with initial HEP.  -LB     STG 1 Progress  Met  -LB     STG 1 Progress Comments  Pt doing well with compliance.  -LB     STG 2  Pt will report 50% improvement in condition at close of work day with use of change of position and AROM during work day.  -LB     STG 2 Progress  Ongoing  -LB     STG 2 Progress Comments  Continued pain at end of work day.  -LB     STG 3  Pt will demonstrate  improved L knee flexion from 112 deg to 116 deg or better.  -LB     STG 3 Progress  Ongoing  -LB        Long Term Goals    LTG Date to Achieve  10/31/19  -LB     LTG 1  Pt will report reduced overall disability as evidenced by KOS disability score from 41% to 30% or less.  -LB     LTG 1 Progress  Ongoing  -LB     LTG 2  Pt will demonstrate 120 deg AROM flexion of L knee.   -LB     LTG 2 Progress  Ongoing  -LB     LTG 3  Pt will demonstrate normal B patellar mobility.   -LB     LTG 3 Progress  Ongoing  -LB       User Key  (r) = Recorded By, (t) = Taken By, (c) = Cosigned By    Initials Name Provider Type    Nadya Jeffery, PT Physical Therapist          Therapy Education  Given: Symptoms/condition management, HEP  Program: Reinforced  How Provided: Verbal  Provided to: Patient  Level of Understanding: Teach back education performed, Verbalized, Demonstrated              Time Calculation:   Start Time: 1615  Stop Time: 1645  Time Calculation (min): 30 min  Total Timed Code Minutes- PT: 30 minute(s)  Therapy Charges for Today     Code Description Service Date Service Provider Modifiers Qty    52553140341 HC PT THER PROC EA 15 MIN 10/29/2019 Nadya Wall, TYSON GP 2                    Nadya Wall PT  10/29/2019

## 2019-10-31 ENCOUNTER — HOSPITAL ENCOUNTER (OUTPATIENT)
Dept: PHYSICAL THERAPY | Facility: HOSPITAL | Age: 59
Setting detail: THERAPIES SERIES
Discharge: HOME | End: 2019-10-31

## 2019-10-31 DIAGNOSIS — G89.29 CHRONIC PAIN OF LEFT KNEE: Primary | ICD-10-CM

## 2019-10-31 DIAGNOSIS — M25.562 CHRONIC PAIN OF LEFT KNEE: Primary | ICD-10-CM

## 2019-11-05 ENCOUNTER — APPOINTMENT (OUTPATIENT)
Dept: PHYSICAL THERAPY | Facility: HOSPITAL | Age: 59
End: 2019-11-05

## 2019-11-07 ENCOUNTER — HOSPITAL ENCOUNTER (OUTPATIENT)
Dept: PHYSICAL THERAPY | Facility: HOSPITAL | Age: 59
Setting detail: THERAPIES SERIES
Discharge: HOME | End: 2019-11-07

## 2019-11-07 DIAGNOSIS — M25.562 CHRONIC PAIN OF LEFT KNEE: Primary | ICD-10-CM

## 2019-11-07 DIAGNOSIS — G89.29 CHRONIC PAIN OF LEFT KNEE: Primary | ICD-10-CM

## 2019-11-07 PROCEDURE — 97110 THERAPEUTIC EXERCISES: CPT
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
## 2019-12-16 RX ORDER — FENOFIBRATE 160 MG/1
TABLET ORAL
Qty: 90 TABLET | Refills: 1 | OUTPATIENT
Start: 2019-12-16

## 2020-01-10 ENCOUNTER — DOCUMENTATION (OUTPATIENT)
Dept: PHYSICAL THERAPY | Facility: HOSPITAL | Age: 60
End: 2020-01-10

## 2020-01-10 NOTE — THERAPY DISCHARGE NOTE
Outpatient Physical Therapy Discharge Summary         Patient Name: Lynda Rueda  : 1960  MRN: [id number]    Today's Date: 1/10/2020    Visit Dx:  No diagnosis found.    PT OP Goals     Row Name 01/10/20 0800          PT Short Term Goals    STG Date to Achieve  10/15/19  -LB     STG 1  Pt will demonstrate understanding and compliance with initial HEP.  -LB     STG 1 Progress  Met  -LB     STG 2  Pt will report 50% improvement in condition at close of work day with use of change of position and AROM during work day.  -LB     STG 2 Progress  Partially Met  -LB     STG 2 Progress Comments  After injection pt improving pain with work day but continued difficulty by the end of work day.  -LB     STG 3  Pt will demonstrate improved L knee flexion from 112 deg to 116 deg or better.  -LB     STG 3 Progress  Not Met  -LB     STG 3 Progress Comments  Not formally reassessed.  -LB        Long Term Goals    LTG Date to Achieve  10/31/19  -LB     LTG 1  Pt will report reduced overall disability as evidenced by KOS disability score from 41% to 30% or less.  -LB     LTG 1 Progress  Not Met  -LB     LTG 1 Progress Comments  Not formally reassessed.  -LB     LTG 2  Pt will demonstrate 120 deg AROM flexion of L knee.   -LB     LTG 2 Progress  Not Met  -LB     LTG 2 Progress Comments  Not formally reassessed.  -LB     LTG 3  Pt will demonstrate normal B patellar mobility.   -LB     LTG 3 Progress  Not Met  -LB     LTG 3 Progress Comments  Not formally reassessed.  -LB       User Key  (r) = Recorded By, (t) = Taken By, (c) = Cosigned By    Initials Name Provider Type    Nadya Jeffery, PT Physical Therapist          OP PT Discharge Summary  Date of Discharge: 10/12/19  Reason for Discharge: Unable to participate, Non-compliant  Outcomes Achieved: Patient able to partially acheive established goals  Discharge Destination: Home with home program  Discharge Instructions/Additional Comments: Pt did not return after last  visit. Unable to reassess goals.       Time Calculation:                    Nadya Wall, PT  1/10/2020

## 2020-06-02 ENCOUNTER — CONSULT (OUTPATIENT)
Dept: ORTHOPEDIC SURGERY | Facility: CLINIC | Age: 60
End: 2020-06-02

## 2020-06-02 VITALS — WEIGHT: 185 LBS | HEIGHT: 61 IN | BODY MASS INDEX: 34.93 KG/M2 | TEMPERATURE: 98.6 F

## 2020-06-02 DIAGNOSIS — S83.242A OTHER TEAR OF MEDIAL MENISCUS OF LEFT KNEE AS CURRENT INJURY, INITIAL ENCOUNTER: ICD-10-CM

## 2020-06-02 DIAGNOSIS — M25.562 LEFT KNEE PAIN, UNSPECIFIED CHRONICITY: Primary | ICD-10-CM

## 2020-06-02 PROCEDURE — 73562 X-RAY EXAM OF KNEE 3: CPT | Performed by: ORTHOPAEDIC SURGERY

## 2020-06-02 PROCEDURE — 99214 OFFICE O/P EST MOD 30 MIN: CPT | Performed by: ORTHOPAEDIC SURGERY

## 2020-06-02 NOTE — PROGRESS NOTES
New Left Knee      Patient: Lynda Rueda        YOB: 1960    Medical Record Number: [id number]        Chief Complaints: left knee pain      History of Present Illness: This is a 59-year-old female said left knee pain for over 1 year she has seen Dr. Bird for this she currently has swelling has night pain her pain is primarily medial she has seen Dr. Bird and has a he has done an MRI which shows a complex tear of the medial meniscus as well as some degenerative changes.  She has had a scope on the right knee in 2013 by Dr. Prieto so she knows what to expect.  Currently for the last couple weeks her symptoms have calmed down a little bit her symptoms are moderate to severe constant stabbing burning with swelling worse with standing sitting driving walking better with anti-inflammatories ice rest she is a  past medical history is remote for thyroid disease depression anxiety and diabetes        Allergies:   Allergies   Allergen Reactions   • Celecoxib Hives   • Methocarbamol Hives     ROBAXIN    • Montelukast Hives   • Penicillins Hives   • Carbomer Other (See Comments)     UNKNOWN REACTION   • [employer] Other (See Comments)     UNKNOWN PER PT   • Gemifloxacin    • Nickel Other (See Comments)     WHEN PT TOOK THE ALLERGY TEST NICKEL, ACRYLIC AND COBALT CAME UP AS ALLERGIES   • Tamiflu [Oseltamivir Phosphate] Rash       Medications:   Home Medications:  Current Outpatient Medications on File Prior to Visit   Medication Sig   • Empagliflozin (JARDIANCE) 25 MG tablet Take  by mouth Daily.   • fenofibrate 160 MG tablet TAKE 1 TABLET BY MOUTH EVERY DAY   • glipiZIDE (GLUCOTROL) 10 MG tablet Take 1 tablet (10 mg total) by mouth 2 (two) times a day. (Patient taking differently: Take 10 mg by mouth Daily.)   • metFORMIN ER (GLUCOPHAGE-XR) 500 MG 24 hr tablet Take 1,000 mg by mouth 2 (Two) Times a Day.   • metoprolol succinate XL (TOPROL-XL) 50 MG 24 hr tablet TAKE 1 TABLET BY MOUTH EVERY  DAY**NEEDS TO MAKE APPT**   • rosuvastatin (CRESTOR) 20 MG tablet TAKE 1 TABLET BY MOUTH EVERY DAY   • SYNTHROID 125 MCG tablet    • vitamin D (ERGOCALCIFEROL) 46281 UNITS capsule capsule    • acetaminophen (TYLENOL) 500 MG tablet Take 1,000 mg by mouth Every 6 (Six) Hours As Needed for Mild Pain .   • citalopram (CeleXA) 20 MG tablet Take 1 tablet by mouth Daily.   • glucose blood test strip OneTouch Ultra Blue In Vitro Strip; Patient Sig: OneTouch Ultra Blue In Vitro Strip ; 100; 0; 2013; Active   • ibuprofen (ADVIL,MOTRIN) 600 MG tablet Take 600 mg by mouth Every 6 (Six) Hours As Needed for Mild Pain .   • linagliptin (TRADJENTA) 5 MG tablet tablet Take 5 mg by mouth.   • pantoprazole (PROTONIX) 40 MG EC tablet Take 1 tablet by mouth Daily.   • SITagliptin (JANUVIA) 100 MG tablet Take 100 mg by mouth Daily.     No current facility-administered medications on file prior to visit.      Current Medications:  Scheduled Meds:  Continuous Infusions:  No current facility-administered medications for this visit.   PRN Meds:.    Past Medical History:   Diagnosis Date   • Anxiety    • Diabetes (CMS/HCC)    • GERD (gastroesophageal reflux disease)    • H/O colonoscopy 2012   • Hyperlipidemia    • Hypertension    • Low back pain    • PONV (postoperative nausea and vomiting)    • Thyroid disease         Past Surgical History:   Procedure Laterality Date   • APPENDECTOMY     • BREAST BIOPSY     •  SECTION     • CHOLECYSTECTOMY     • COLONOSCOPY N/A 6/15/2018    Procedure: COLONOSCOPY INTO CECUM AND T.I. WITH BIOPSIES AND COLD BIOPSY POLYPECTOMY;  Surgeon: Sunil Cardona MD;  Location: [facility] ENDOSCOPY;  Service: Gastroenterology   • DILATATION AND CURETTAGE     • HYSTERECTOMY  2007    OVARIAN MASS/SMALL INTESTINE   • KNEE SURGERY Left    • OVARIAN CYST REMOVAL     • THYROIDECTOMY  2016        Social History     Occupational History   • Not on file   Tobacco Use   • Smoking status: Never Smoker  "  • Smokeless tobacco: Never Used   Substance and Sexual Activity   • Alcohol use: Yes     Comment: SOCIAL DRINKER    • Drug use: Defer   • Sexual activity: Defer      Social History     Social History Narrative    LIVES WITH SPOUSE        Family History   Problem Relation Age of Onset   • COPD Mother    • Hyperlipidemia Mother    • Hypertension Mother    • Melanoma Mother    • Heart failure Mother    • Transient ischemic attack Mother    • Colon cancer Father    • No Known Problems Sister    • Heart attack Brother              Review of Systems: 14 point review of systems are remarkable for the pertinent positives listed in the chart by the patient the remainder negative    Review of Systems      Physical Exam: 59 y.o. female  General Appearance:    Alert, cooperative, in no acute distress                   Vitals:    06/02/20 1507   Temp: 98.6 °F (37 °C)   Weight: 83.9 kg (185 lb)   Height: 154.9 cm (61\")   PainSc:   3      Patient is alert and read ×3 no acute distress appears her above-listed at height weight and age.  Affect is normal respiratory rate is normal unlabored. Heart rate regular rate rhythm, sclera, dentition and hearing are normal for the purpose of this exam.        Ortho Exam Physical exam of the left knee reveals no effusion no redness.  The patient does have tenderness about the medial joint line.  No tenderness about the lateral joint line.  A negative bounce home and a positive medial Samir.  There is some pain medially  with a lateral Samir.  Patient has a stable ligamentous exam.  Quads are reasonable and symmetric bilaterally.  Calf is soft and nontender.  There is no overlying skin changes no lymphedema lymphadenopathy.  Patient has good hip range of motion full symmetric and asymptomatic and a normal ankle exam.    Procedures             Radiology:   AP, Lateral and merchant views of the left knee  were ordered/reviewed to evauateknee pain.  I have compared to previous films she " does have some narrowing her medial joint space on the right left looks good she has slightly lateral riding patellas bilaterally with some patellofemoral OA bilaterally  Imaging Results (Most Recent)     Procedure Component Value Units Date/Time    XR Knee 3 View Left [[id number]] Resulted:  06/02/20 1456     Updated:  06/02/20 1456    Impression:       Ordering physician's impression is located in the Encounter Note dated 06/02/20. X-ray performed in the DR room.          Assessment/Plan:      Left knee pain ongoing for 1 year she does have meniscus tear on MRI we talked about arthroscopy and what that entails she wants to think about that and will give me a call

## 2020-07-17 ENCOUNTER — APPOINTMENT (OUTPATIENT)
Dept: WOMENS IMAGING | Facility: HOSPITAL | Age: 60
End: 2020-07-17

## 2020-07-17 PROCEDURE — 77063 BREAST TOMOSYNTHESIS BI: CPT | Performed by: RADIOLOGY

## 2020-07-17 PROCEDURE — 77067 SCR MAMMO BI INCL CAD: CPT | Performed by: RADIOLOGY

## 2020-09-14 ENCOUNTER — OFFICE VISIT (OUTPATIENT)
Dept: ORTHOPEDIC SURGERY | Facility: CLINIC | Age: 60
End: 2020-09-14

## 2020-09-14 VITALS — WEIGHT: 190 LBS | HEIGHT: 61 IN | BODY MASS INDEX: 35.87 KG/M2 | TEMPERATURE: 96.9 F

## 2020-09-14 DIAGNOSIS — S49.91XA INJURY OF RIGHT SHOULDER, INITIAL ENCOUNTER: Primary | ICD-10-CM

## 2020-09-14 PROCEDURE — 99214 OFFICE O/P EST MOD 30 MIN: CPT | Performed by: NURSE PRACTITIONER

## 2020-09-14 PROCEDURE — 73030 X-RAY EXAM OF SHOULDER: CPT | Performed by: NURSE PRACTITIONER

## 2020-09-14 PROCEDURE — 20610 DRAIN/INJ JOINT/BURSA W/O US: CPT | Performed by: NURSE PRACTITIONER

## 2020-09-14 RX ORDER — MELOXICAM 15 MG/1
15 TABLET ORAL DAILY
Qty: 30 TABLET | Refills: 1 | Status: SHIPPED | OUTPATIENT
Start: 2020-09-14 | End: 2021-01-27

## 2020-09-14 RX ADMIN — METHYLPREDNISOLONE ACETATE 80 MG: 80 INJECTION, SUSPENSION INTRA-ARTICULAR; INTRALESIONAL; INTRAMUSCULAR; SOFT TISSUE at 10:53

## 2020-09-14 NOTE — PROGRESS NOTES
Patient: Lynda Rueda    YOB: 1960    Medical Record Number: [id number]    Chief Complaints:  Right shoulder pain    History of Present Illness:     59 y.o. female patient who presents with a complaint of right shoulder pain.  Injury occurred 2 days ago.  She was hiking in [place] when she tripped over a tree root.  Reports she fell forward, sliding with arms outstretched.  She experienced immediate pain.  Pain is moderate, constant, and stabbing.  The pain is worse with sitting as well as certain reaching and lifting movements.  She has tried ice, rest, Aleve, and Tylenol without relief.  She denies any shooting pain down the arm, distal weakness, numbness or paresthesias.  Patient is left hand dominant.    Allergies:   Allergies   Allergen Reactions   • Celecoxib Hives   • Methocarbamol Hives     ROBAXIN    • Montelukast Hives   • Penicillins Hives   • Carbomer Other (See Comments)     UNKNOWN REACTION   • [place] Other (See Comments)     UNKNOWN PER PT   • Gemifloxacin    • Nickel Other (See Comments)     WHEN PT TOOK THE ALLERGY TEST NICKEL, ACRYLIC AND COBALT CAME UP AS ALLERGIES   • Tamiflu [Oseltamivir Phosphate] Rash     Home Medications:    Current Outpatient Medications:   •  acetaminophen (TYLENOL) 500 MG tablet, Take 1,000 mg by mouth Every 6 (Six) Hours As Needed for Mild Pain ., Disp: , Rfl:   •  citalopram (CeleXA) 20 MG tablet, Take 1 tablet by mouth Daily., Disp: 90 tablet, Rfl: 0  •  Empagliflozin (JARDIANCE) 25 MG tablet, Take  by mouth Daily., Disp: , Rfl:   •  fenofibrate 160 MG tablet, TAKE 1 TABLET BY MOUTH EVERY DAY, Disp: 90 tablet, Rfl: 1  •  glipiZIDE (GLUCOTROL) 10 MG tablet, Take 1 tablet (10 mg total) by mouth 2 (two) times a day. (Patient taking differently: Take 10 mg by mouth Daily.), Disp: 180 tablet, Rfl: 2  •  glucose blood test strip, OneTouch Ultra Blue In Vitro Strip; Patient Sig: OneTouch Ultra Blue In Vitro Strip ; 100; 0; 11-Nov-2013; Active,  Disp: , Rfl:   •  linagliptin (TRADJENTA) 5 MG tablet tablet, Take 5 mg by mouth., Disp: , Rfl:   •  metFORMIN ER (GLUCOPHAGE-XR) 500 MG 24 hr tablet, Take 1,000 mg by mouth 2 (Two) Times a Day., Disp: , Rfl:   •  metoprolol succinate XL (TOPROL-XL) 50 MG 24 hr tablet, TAKE 1 TABLET BY MOUTH EVERY DAY**NEEDS TO MAKE APPT**, Disp: 90 tablet, Rfl: 0  •  pantoprazole (PROTONIX) 40 MG EC tablet, Take 1 tablet by mouth Daily., Disp: 90 tablet, Rfl: 1  •  rosuvastatin (CRESTOR) 20 MG tablet, TAKE 1 TABLET BY MOUTH EVERY DAY, Disp: 30 tablet, Rfl: 0  •  SITagliptin (JANUVIA) 100 MG tablet, Take 100 mg by mouth Daily., Disp: , Rfl:   •  SYNTHROID 125 MCG tablet, , Disp: , Rfl:   •  vitamin D (ERGOCALCIFEROL) 90674 UNITS capsule capsule, , Disp: , Rfl:   •  ibuprofen (ADVIL,MOTRIN) 600 MG tablet, Take 600 mg by mouth Every 6 (Six) Hours As Needed for Mild Pain ., Disp: , Rfl:   •  meloxicam (Mobic) 15 MG tablet, Take 1 tablet by mouth Daily., Disp: 30 tablet, Rfl: 1    Past Medical History:   Diagnosis Date   • Anxiety    • Diabetes (CMS/HCC)    • GERD (gastroesophageal reflux disease)    • H/O colonoscopy 2012   • Hyperlipidemia    • Hypertension    • Low back pain    • PONV (postoperative nausea and vomiting)    • Thyroid disease        Past Surgical History:   Procedure Laterality Date   • APPENDECTOMY     • BREAST BIOPSY     •  SECTION     • CHOLECYSTECTOMY     • COLONOSCOPY N/A 6/15/2018    Procedure: COLONOSCOPY INTO CECUM AND T.I. WITH BIOPSIES AND COLD BIOPSY POLYPECTOMY;  Surgeon: Sunil Cardona MD;  Location: [facility] ENDOSCOPY;  Service: Gastroenterology   • DILATATION AND CURETTAGE     • HYSTERECTOMY  2007    OVARIAN MASS/SMALL INTESTINE   • KNEE SURGERY Left    • OVARIAN CYST REMOVAL     • THYROIDECTOMY  2016       Social History     Occupational History   • Not on file   Tobacco Use   • Smoking status: Never Smoker   • Smokeless tobacco: Never Used   Substance and Sexual Activity   •  "Alcohol use: Yes     Comment: SOCIAL DRINKER    • Drug use: Defer   • Sexual activity: Defer      Social History     Social History Narrative    LIVES WITH SPOUSE       Family History   Problem Relation Age of Onset   • COPD Mother    • Hyperlipidemia Mother    • Hypertension Mother    • Melanoma Mother    • Heart failure Mother    • Transient ischemic attack Mother    • Colon cancer Father    • No Known Problems Sister    • Heart attack Brother      Review of Systems:      Constitutional: Denies fever, shaking or chills   Eyes: Denies change in visual acuity   HEENT: Denies nasal congestion or sore throat   Respiratory: Denies cough or shortness of breath   Cardiovascular: Denies chest pain or edema  Endocrine: Denies tremors, palpitations, intolerance of heat or cold, polyuria, polydipsia.  GI: Denies abdominal pain, nausea, vomiting, bloody stools or diarrhea  : Denies frequency, urgency, incontinence, retention, or nocturia.  Musculoskeletal: Denies numbness, tingling or loss of motor function except as above  Integument: Denies rash, lesion or ulceration   Neurologic: Denies headache or focal weakness, deficits  Heme: Denies spontaneous or excessive bleeding, epistaxis, hematuria, melena, fatigue, enlarged or tender lymph nodes.      All other pertinent positives and negatives as noted above in HPI.    Physical Exam:   59 y.o. female    Vitals:    09/14/20 1013   Temp: 96.9 °F (36.1 °C)   Weight: 86.2 kg (190 lb)   Height: 154.9 cm (61\")     General:  Patient is awake and alert.  Appears in no acute distress or discomfort.    Psych:  Affect and demeanor are appropriate.    Eyes:  Conjunctiva and sclera appear grossly normal.  Eyes track well and EOM seem to be intact.    Ears:  No gross abnormalities.  Hearing adequate for the exam.    Cardiovascular:  Regular rate and rhythm.    Lungs:  Good chest expansion.  Breathing unlabored.    Lymph:  No palpable adenopathy about neck or axilla.    Neck:  Supple.  " Normal ROM.  Negative Spurling's for shoulder or arm pain.    Right upper extremity:  Skin is benign over the shoulder.  Abrasion posterior elbow region.  No gross abnormalities on inspection including any atrophy, swellings, or masses.  No palpable masses or adenopathy.  Focal tenderness over the anterior region.  Crepitus with passive range of motion.  Full shoulder motion albeit with discomfort.  No evident instability or apprehension.  Positive Neer, Lewis, Bear Hugger.  Weakness with forward elevation in the scapular plane.  Good strength with internal and external rotation.  Good strength in the deltoid, biceps, triceps, and .  Intact sensation throughout the arm.  Brisk cap refill.  Palpable radial pulse.  Good skin turgor.         Radiology:   Dr. Major and I reviewed the x-rays together.  AP, scapular Y, and axillary views of the right shoulder are ordered by myself and reviewed to evaluate the patient's complaint.  No comparison films are immediately available.  The x-rays show no obvious acute abnormalities, lesions, masses, significant degenerative changes, or other concerning findings.  The acromiohumeral interval is normal.  Glenoid version appears normal as well.  Glucose monitor noted upper arm.    Assessment/Plan:   Right shoulder injury, possible rotator cuff tear    We discussed the natural history of rotator cuff tears and risk of potential tear progression.  We thoroughly discussed options in detail including a trial of conservative treatment versus further work-up and potential surgical options. She has acknowledged understanding of this information.  The patient would prefer to pursue nonsurgical management at this time.  The risk, benefits and alternatives to an injection were thoroughly discussed.  She consented and the injection was performed as described below.  I have entered a referral to physical therapy.  I have given her a prescription for meloxicam.  The risks of this  medication were discussed.  She may return to work immediately with the following restrictions:   no repetitive motions, no pushing, no pulling, or lifting greater than 5 lbs. with right arm.  She will follow-up with me in 6 weeks.     CHANTALE Potts    09/14/2020    CC to Renzo Avila MD       Large Joint Arthrocentesis: R subacromial bursa  Date/Time: 9/14/2020 10:53 AM  Consent given by: patient  Site marked: site marked  Timeout: Immediately prior to procedure a time out was called to verify the correct patient, procedure, equipment, support staff and site/side marked as required   Supporting Documentation  Indications: pain   Procedure Details  Location: shoulder - R subacromial bursa  Preparation: Patient was prepped and draped in the usual sterile fashion  Needle gauge: 21 G.  Approach: posterior  Medications administered: 2 mL lidocaine (cardiac); 80 mg methylPREDNISolone acetate 80 MG/ML  Patient tolerance: patient tolerated the procedure well with no immediate complications

## 2020-09-16 ENCOUNTER — TREATMENT (OUTPATIENT)
Dept: PHYSICAL THERAPY | Facility: CLINIC | Age: 60
End: 2020-09-16

## 2020-09-16 DIAGNOSIS — M25.511 ACUTE PAIN OF RIGHT SHOULDER: Primary | ICD-10-CM

## 2020-09-16 PROCEDURE — 97161 PT EVAL LOW COMPLEX 20 MIN: CPT | Performed by: PHYSICAL THERAPIST

## 2020-09-16 PROCEDURE — 97110 THERAPEUTIC EXERCISES: CPT | Performed by: PHYSICAL THERAPIST

## 2020-09-16 RX ORDER — METHYLPREDNISOLONE ACETATE 80 MG/ML
80 INJECTION, SUSPENSION INTRA-ARTICULAR; INTRALESIONAL; INTRAMUSCULAR; SOFT TISSUE
Status: COMPLETED | OUTPATIENT
Start: 2020-09-14 | End: 2020-09-14

## 2020-09-17 NOTE — PROGRESS NOTES
Physical Therapy Initial Evaluation and Plan of Care      Patient: Lynda Rueda   : 1960  Diagnosis/ICD-10 Code:  Acute pain of right shoulder [M25.511]  Referring practitioner: CHANTALE Potts  Date of Initial Visit: 2020  Today's Date: 2020  Patient seen for 1 sessions           Subjective Evaluation    History of Present Illness    Subjective comment: pt reports FOSH while hiking 6 days ago.  pt had injection in (R) shoulder with benefit.  reports 6/ 10 (R) shoulder painPain  Current pain ratin  At best pain ratin  At worst pain ratin  Quality: tight, throbbing, sharp and dull ache  Aggravating factors: movement, lifting, outstretched reach and repetitive movement    Patient Goals  Patient goals for therapy: increased motion, decreased pain, increased strength, independence with ADLs/IADLs, return to sport/leisure activities and return to work             Objective           General Comments     Shoulder Comments   Pt reports FOSH (R) shoulder 5-6 days ago while hiking.  Pt reports 6/ 10 (R) shoulder pain. Pt had a injection into shoulder with benefit. (-) xray (R) shoulder.  (R) shoulder PROM in flexion 95/ ab 75; severe ttp (R) supraspinatus/ bicipital groove. (+) speeds/ HK test;  MMT (R) shoulder in flexion 3+/5, ab 3+/5, scap 3+/5, ER 4-/5, bicep 3+/5 all painful.  Pt denies acute pain at night.  All dermatomes/ myotomes are intact and symmetrical .  Pt reports poor tolerance with reaching/ lifting/ dressing-grooming activities.        See Exercise, Manual, and Modality Logs for complete treatment.   Functional outcome score: 37% spadi            Assessment & Plan     Assessment  Impairments: abnormal gait, abnormal muscle firing, abnormal or restricted ROM, activity intolerance, impaired balance, impaired physical strength, pain with function and safety issue  Assessment details: Lynda Rueda is a 59 y.o. year-old female referred to physical therapy for (R)  shoulder pain. She presents with a stable clinical presentation.  She has no comorbidities and no personal factors  that may affect her progress in the plan of care.  Pt reports FOSH (R) shoulder 5-6 days ago while hiking.  Pt reports 6/ 10 (R) shoulder pain. Pt had a injection into shoulder with benefit. (-) xray (R) shoulder.  (R) shoulder PROM in flexion 95/ ab 75; severe ttp (R) supraspinatus/ bicipital groove. (+) speeds/ HK test;  MMT (R) shoulder in flexion 3+/5, ab 3+/5, scap 3+/5, ER 4-/5, bicep 3+/5 all painful.  Pt denies acute pain at night.  All dermatomes/ myotomes are intact and symmetrical .  Pt reports poor tolerance with reaching/ lifting/ dressing-grooming activities.   Signs and symptoms are consistent with physical therapy diagnosis of (R) shoulder pain  Prognosis: good  Functional Limitations: carrying objects, lifting, walking, pulling, uncomfortable because of pain, standing, stooping and reaching behind back  Goals  Plan Goals: stg 6 wks    Pt to be educated/ independent with precautions/ restrictions.     Increase (R) shoulder PROM in flexion 160 / abduction 160 to allow for increased ease with dressing/grooming/bathing activities.    Pt to deny pain at rest in (R) shoulder    ltg    Decreased ttp (R) supraspinatus/ bicipital groove from severe to minimal to allow for increased ease with dressing/ grooming / bathing activities.      Increased (R) shoulder AROM 90 degrees in flexion/ abduction without apprehension to allow for increased ease with modified reaching/ dressing bathing activities.    Increased (R) shoulder MMT to 4-/5 at 90 degrees to allow for increased ease with modified lifting task without exacerbation of pain greater than 3/ 10 consistently.     Plan  Therapy options: will be seen for skilled physical therapy services  Planned modality interventions: cryotherapy, electrical stimulation/Russian stimulation, TENS, ultrasound and thermotherapy (hydrocollator packs)  Planned  therapy interventions: abdominal trunk stabilization, manual therapy, motor coordination training, neuromuscular re-education, balance/weight-bearing training, ADL retraining, flexibility, functional ROM exercises, gait training, home exercise program, joint mobilization, transfer training, therapeutic activities, strengthening, stretching and soft tissue mobilization  Duration in weeks: 12  Treatment plan discussed with: patient        Timed:  Manual Therapy:         mins  09731;  Therapeutic Exercise:   20      mins  97774;     Neuromuscular Erica:        mins  19833;    Therapeutic Activity:          mins  09865;     Gait Training:           mins  48494;     Ultrasound:          mins  98501;    Electrical Stimulation:         mins  01320 ( );  Iontophoresis         mins 54627  Dry Needling        mins      Untimed:  Electrical Stimulation:         mins  50809 ( );  Mechanical Traction:         mins  27015;     Timed Treatment:    20  mins   Total Treatment:    40    mins    PT SIGNATURE: Joe Carey, PT   DATE TREATMENT INITIATED: 9/17/2020    Initial Certification Period: 12/16/2020  I certify that the therapy services are furnished while this patient is under my care.  The services outlined above are required by this patient, and will be reviewed every 90 days.     PHYSICIAN: Radha Allen, CHANTALE      DATE:     Please sign and return via fax to [phone].. Thank you, [facility].

## 2020-09-18 ENCOUNTER — TREATMENT (OUTPATIENT)
Dept: PHYSICAL THERAPY | Facility: CLINIC | Age: 60
End: 2020-09-18

## 2020-09-18 DIAGNOSIS — M25.511 ACUTE PAIN OF RIGHT SHOULDER: Primary | ICD-10-CM

## 2020-09-18 PROCEDURE — 97110 THERAPEUTIC EXERCISES: CPT | Performed by: PHYSICAL THERAPIST

## 2020-09-18 NOTE — PROGRESS NOTES
Physical Therapy Daily Progress Note    Patient: Lynda Rueda   : 1960  Diagnosis/ICD-10 Code:  Acute pain of right shoulder [M25.511]  Referring practitioner: CHANTALE Potts  Date of Initial Visit: Type: THERAPY  Noted: 2020  Today's Date: 2020  Patient seen for 2 sessions           Subjective compliant with shoulder precautions    Objective   See Exercise, Manual, and Modality Logs for complete treatment.       Assessment/Plan       progressed with (R) shoulder PROM/ AAROM per tolerance. 100 flexion PROM/ 80 degrees ab    Timed:    Manual Therapy:        mins  01638;  Therapeutic Exercise:   40      mins  97492;     Neuromuscular Erica:        mins  05953;    Therapeutic Activity:          mins  53133;     Gait Training:           mins  68114;     Ultrasound:          mins  28678;    Electrical Stimulation:         mins  47677 ( );  Iontophoresis         mins 01174;  Aquatic Therapy         mins 87182;  Dry Needling                   mins    Untimed:  Electrical Stimulation:         mins  45519 ( );  Mechanical Traction:         mins  83028;     Timed Treatment:  40    mins   Total Treatment:    40    mins  Joe Carey, PT  Physical Therapist

## 2020-09-21 ENCOUNTER — TREATMENT (OUTPATIENT)
Dept: PHYSICAL THERAPY | Facility: CLINIC | Age: 60
End: 2020-09-21

## 2020-09-21 DIAGNOSIS — M25.511 ACUTE PAIN OF RIGHT SHOULDER: Primary | ICD-10-CM

## 2020-09-21 PROCEDURE — 97140 MANUAL THERAPY 1/> REGIONS: CPT | Performed by: PHYSICAL THERAPIST

## 2020-09-21 PROCEDURE — 97110 THERAPEUTIC EXERCISES: CPT | Performed by: PHYSICAL THERAPIST

## 2020-09-22 NOTE — PROGRESS NOTES
Physical Therapy Daily Progress Note    Patient: Lynda Rueda   : 1960  Diagnosis/ICD-10 Code:  Acute pain of right shoulder [M25.511]  Referring practitioner: CHANTALE Potts  Date of Initial Visit: Type: THERAPY  Noted: 2020  Today's Date: 2020  Patient seen for 3 sessions           Subjective I tried to use it too much today.  Sore. (+) night pain  Objective   See Exercise, Manual, and Modality Logs for complete treatment.       Assessment/Plan    Implemented manual to improved shoulder flexion.  PROM in flex post / ab 115; decreased guarding this visit.  Issued home shoulder pulleys.  [facility]- precautions/ restrictions.        Timed:    Manual Therapy:   8     mins  57042;  Therapeutic Exercise:       25  mins  37760;     Neuromuscular Erica:        mins  13544;    Therapeutic Activity:          mins  33975;     Gait Training:           mins  90464;     Ultrasound:          mins  05797;    Electrical Stimulation:         mins  38242 ( );  Iontophoresis         mins 42290;  Aquatic Therapy         mins 43684;  Dry Needling                   mins    Untimed:  Electrical Stimulation:         mins  17325 (MC );  Mechanical Traction:         mins  96333;     Timed Treatment: 35     mins   Total Treatment:       35 mins  Joe Carey, PT  Physical Therapist

## 2020-09-24 ENCOUNTER — TREATMENT (OUTPATIENT)
Dept: PHYSICAL THERAPY | Facility: CLINIC | Age: 60
End: 2020-09-24

## 2020-09-24 DIAGNOSIS — M25.511 ACUTE PAIN OF RIGHT SHOULDER: Primary | ICD-10-CM

## 2020-09-24 PROCEDURE — 97140 MANUAL THERAPY 1/> REGIONS: CPT | Performed by: PHYSICAL THERAPIST

## 2020-09-24 PROCEDURE — 97110 THERAPEUTIC EXERCISES: CPT | Performed by: PHYSICAL THERAPIST

## 2020-09-24 NOTE — PROGRESS NOTES
Physical Therapy Daily Progress Note    Patient: Lynda Rueda   : 1960  Diagnosis/ICD-10 Code:  Acute pain of right shoulder [M25.511]  Referring practitioner: CHANTALE Potts  Date of Initial Visit: Type: THERAPY  Noted: 2020  Today's Date: 2020  Patient seen for 4 sessions           Subjective   Shoulder is sore today  Objective   See Exercise, Manual, and Modality Logs for complete treatment.       Assessment/Plan    Progressed with (R) shoulder PROM/ AAROM per tolerance.  Implemented manual PT To decreased shoulder pain improve ROM.  Increased ms guarding this visit unable to improve ROM secondary to pain/ revisited precautions/ restrictions.        Timed:    Manual Therapy:   10     mins  85154;  Therapeutic Exercise:     20    mins  41485;     Neuromuscular Erica:        mins  38950;    Therapeutic Activity:          mins  81224;     Gait Training:           mins  59227;     Ultrasound:          mins  38436;    Electrical Stimulation:         mins  29916 ( );  Iontophoresis         mins 69546;  Aquatic Therapy         mins 93843;  Dry Needling                   mins    Untimed:  Electrical Stimulation:         mins  40895 (MC );  Mechanical Traction:         mins  89535;     Timed Treatment:  30    mins   Total Treatment:     30  mins  Joe Carey, PT  Physical Therapist

## 2020-10-01 ENCOUNTER — TREATMENT (OUTPATIENT)
Dept: PHYSICAL THERAPY | Facility: CLINIC | Age: 60
End: 2020-10-01

## 2020-10-01 DIAGNOSIS — M25.511 ACUTE PAIN OF RIGHT SHOULDER: Primary | ICD-10-CM

## 2020-10-01 PROCEDURE — 97140 MANUAL THERAPY 1/> REGIONS: CPT | Performed by: PHYSICAL THERAPIST

## 2020-10-01 PROCEDURE — 97530 THERAPEUTIC ACTIVITIES: CPT | Performed by: PHYSICAL THERAPIST

## 2020-10-01 PROCEDURE — 97110 THERAPEUTIC EXERCISES: CPT | Performed by: PHYSICAL THERAPIST

## 2020-10-01 NOTE — PROGRESS NOTES
Physical Therapy Daily Progress Note    Patient: Lynda Rueda   : 1960  Diagnosis/ICD-10 Code:  Acute pain of right shoulder [M25.511]  Referring practitioner: CHANTALE Potts  Date of Initial Visit: Type: THERAPY  Noted: 2020  Today's Date: 10/1/2020  Patient seen for 5 sessions           Subjective I am able to move it better.     Objective   See Exercise, Manual, and Modality Logs for complete treatment.       Assessment/Plan    Full AAROM (R) shoulder in flexion; cuing with to prevent UT compensation. Progressed with AROM per tolerance.         Timed:    Manual Therapy:   15     mins  36969;  Therapeutic Exercise:      15   mins  86494;     Neuromuscular Erica:        mins  78134;    Therapeutic Activity:      15    mins  47810;     Gait Training:           mins  44897;     Ultrasound:          mins  59602;    Electrical Stimulation:         mins  03728 ( );  Iontophoresis         mins 20117;  Aquatic Therapy         mins 42548;  Dry Needling                   mins    Untimed:  Electrical Stimulation:         mins  00654 ( );  Mechanical Traction:         mins  71959;     Timed Treatment:  45    mins   Total Treatment:     45   mins  Joe Carey, PT  Physical Therapist

## 2020-10-07 ENCOUNTER — TREATMENT (OUTPATIENT)
Dept: PHYSICAL THERAPY | Facility: CLINIC | Age: 60
End: 2020-10-07

## 2020-10-07 DIAGNOSIS — M25.511 ACUTE PAIN OF RIGHT SHOULDER: Primary | ICD-10-CM

## 2020-10-07 PROCEDURE — 97110 THERAPEUTIC EXERCISES: CPT | Performed by: PHYSICAL THERAPIST

## 2020-10-07 PROCEDURE — 97140 MANUAL THERAPY 1/> REGIONS: CPT | Performed by: PHYSICAL THERAPIST

## 2020-10-08 NOTE — PROGRESS NOTES
Physical Therapy Daily Progress Note    Patient: Lynda Rueda   : 1960  Diagnosis/ICD-10 Code:  No primary diagnosis found.  Referring practitioner: CHANTALE Potts  Date of Initial Visit: Type: THERAPY  Noted: 2020  Today's Date: 10/8/2020  Patient seen for 6 sessions           Subjective shoulder is really sore today    Objective   See Exercise, Manual, and Modality Logs for complete treatment.       Assessment/Plan    Decreased tolerance with AAROM/ AROM ; increased (R) shoulder ms guarding.  Implemented manual therapy.  Reviewed precautions/ restrictions.        Timed:    Manual Therapy:   10     mins  37277;  Therapeutic Exercise:     35  mins  97172;     Neuromuscular Erica:        mins  74830;    Therapeutic Activity:          mins  42206;     Gait Training:           mins  77942;     Ultrasound:          mins  39091;    Electrical Stimulation:         mins  42929 ( );  Iontophoresis         mins 81130;  Aquatic Therapy         mins 82697;  Dry Needling                   mins    Untimed:  Electrical Stimulation:         mins  47391 ( );  Mechanical Traction:         mins  18729;     Timed Treatment:   45   mins   Total Treatment:    45    mins  Joe Carey, PT  Physical Therapist

## 2020-10-09 ENCOUNTER — TREATMENT (OUTPATIENT)
Dept: PHYSICAL THERAPY | Facility: CLINIC | Age: 60
End: 2020-10-09

## 2020-10-09 DIAGNOSIS — M25.511 ACUTE PAIN OF RIGHT SHOULDER: Primary | ICD-10-CM

## 2020-10-09 PROCEDURE — 97110 THERAPEUTIC EXERCISES: CPT | Performed by: PHYSICAL THERAPIST

## 2020-10-09 PROCEDURE — 97530 THERAPEUTIC ACTIVITIES: CPT | Performed by: PHYSICAL THERAPIST

## 2020-10-11 NOTE — PROGRESS NOTES
Physical Therapy Daily Progress Note    Patient: Lynda Rueda   : 1960  Diagnosis/ICD-10 Code:  Acute pain of right shoulder [M25.511]  Referring practitioner: CHANTALE Potts  Date of Initial Visit: Type: THERAPY  Noted: 2020  Today's Date: 10/11/2020  Patient seen for 7 sessions           Subjective shoulder is feeling better. Catching less.     Objective   See Exercise, Manual, and Modality Logs for complete treatment.       Assessment/Plan    Improved (R) shoulder PROM 150 in flexion/ abduction 160 degrees.  Pain with (R) shoulder AROM above 85 degrees in flexion.  Decreased ms guarding with AAROM.          Timed:    Manual Therapy:         mins  22389;  Therapeutic Exercise:      35   mins  80401;     Neuromuscular Erica:        mins  27680;    Therapeutic Activity:    10      mins  67436;     Gait Training:           mins  10163;     Ultrasound:          mins  19186;    Electrical Stimulation:         mins  24543 ( );  Iontophoresis         mins 59883;  Aquatic Therapy         mins 77164;  Dry Needling                   mins    Untimed:  Electrical Stimulation:         mins  36571 ( );  Mechanical Traction:         mins  08724;     Timed Treatment:   45   mins   Total Treatment:    45    mins  Joe Carey, PT  Physical Therapist

## 2020-10-12 ENCOUNTER — TREATMENT (OUTPATIENT)
Dept: PHYSICAL THERAPY | Facility: CLINIC | Age: 60
End: 2020-10-12

## 2020-10-12 DIAGNOSIS — M25.511 ACUTE PAIN OF RIGHT SHOULDER: Primary | ICD-10-CM

## 2020-10-12 PROCEDURE — 97530 THERAPEUTIC ACTIVITIES: CPT | Performed by: PHYSICAL THERAPIST

## 2020-10-12 PROCEDURE — 97110 THERAPEUTIC EXERCISES: CPT | Performed by: PHYSICAL THERAPIST

## 2020-10-19 ENCOUNTER — TREATMENT (OUTPATIENT)
Dept: PHYSICAL THERAPY | Facility: CLINIC | Age: 60
End: 2020-10-19

## 2020-10-19 DIAGNOSIS — M25.511 ACUTE PAIN OF RIGHT SHOULDER: Primary | ICD-10-CM

## 2020-10-19 PROCEDURE — 97110 THERAPEUTIC EXERCISES: CPT | Performed by: PHYSICAL THERAPIST

## 2020-10-19 PROCEDURE — 97140 MANUAL THERAPY 1/> REGIONS: CPT | Performed by: PHYSICAL THERAPIST

## 2020-10-20 NOTE — PROGRESS NOTES
Physical Therapy Daily Progress Note    Patient: Lynda Rueda   : 1960  Diagnosis/ICD-10 Code:  Acute pain of right shoulder [M25.511]  Referring practitioner: CHANTALE Potts  Date of Initial Visit: Type: THERAPY  Noted: 2020  Today's Date: 10/20/2020  Patient seen for 9 sessions           Subjective shoulder hurting all day.  (+) night pain.    Objective   See Exercise, Manual, and Modality Logs for complete treatment.       Assessment/Plan  Pt follows up with ortho next week. Symptoms appear to be unrelenting.  Difficulty with PROM/ AAROM/ AROM possible internal derangement         Timed:    Manual Therapy:    10     mins  39684;  Therapeutic Exercise:       35  mins  52334;     Neuromuscular Erica:        mins  31707;    Therapeutic Activity:          mins  83302;     Gait Training:           mins  14766;     Ultrasound:          mins  66600;    Electrical Stimulation:         mins  93312 ( );  Iontophoresis         mins 68651;  Aquatic Therapy         mins 04552;  Dry Needling                   mins    Untimed:  Electrical Stimulation:         mins  94466 ( );  Mechanical Traction:         mins  59705;     Timed Treatment:   45   mins   Total Treatment:    45    mins  Joe Carey, PT  Physical Therapist

## 2020-10-23 ENCOUNTER — TREATMENT (OUTPATIENT)
Dept: PHYSICAL THERAPY | Facility: CLINIC | Age: 60
End: 2020-10-23

## 2020-10-23 DIAGNOSIS — M25.511 ACUTE PAIN OF RIGHT SHOULDER: Primary | ICD-10-CM

## 2020-10-23 PROCEDURE — 97110 THERAPEUTIC EXERCISES: CPT | Performed by: PHYSICAL THERAPIST

## 2020-10-25 ENCOUNTER — TELEPHONE (OUTPATIENT)
Dept: ORTHOPEDIC SURGERY | Facility: CLINIC | Age: 60
End: 2020-10-25

## 2020-10-25 NOTE — PROGRESS NOTES
30-Day / 10-Visit Progress Note         Patient: Lynda Rueda   : 1960  Diagnosis/ICD-10 Code:  Acute pain of right shoulder [M25.511]  Referring practitioner: CHANTALE Potts  Date of Initial Visit: Type: THERAPY  Noted: 2020  Today's Date: 10/25/2020  Patient seen for 10 sessions      Subjective:     Clinical Progress:   Home Program Compliance: good  Treatment has included:  therapeutic exercise    Subjective Evaluation    History of Present Illness    Subjective comment: my shoulder hurts when i tried to reach / lift anything with (R) side.  (+) night pain     Objective   See Exercise, Manual, and Modality Logs for complete treatm      Assessment & Plan     Assessment  Impairments: abnormal gait, abnormal muscle firing, abnormal or restricted ROM, activity intolerance, impaired balance, impaired physical strength, pain with function and safety issue  Assessment details: Lynda Rueda is a 59 y.o. year-old female referred to physical therapy for (R) shoulder pain. She presents with a stable clinical presentation.  She has no comorbidities and no personal factors  that may affect her progress in the plan of care.  Pt has been seen in PT For 4 wks for the evaluation/ treatment of (R) shoudler pain 5 wks post (R)  [facility] .  Pt reports 6/ 10 (R) shoulder pain. Pt had a injection into shoulder with minimal benefit. (-) xray (R) shoulder.  Pt demonstrates poor tolerance with (R) shoulder AROM with apprehension;  Pain with IR AROM.(R) shoulder PROM in flexion 105/ ab 75; severe ttp (R) supraspinatus/ bicipital groove. (+) speeds/ (+)Obrient;  MMT (R) shoulder in flexion 3+/5, ab 3+/5, scap 3+/5, ER 4-/5, bicep 3+/5 all painful.  Pt denies acute pain at night.  Pt has not responded well with PT services and is appropriate for reassessment per orthopedic consult to determine appropriate POC.  PT to be placed on hold at this time till further orders.  Pt agrees with POC and has been compliant  with all PT services/ along with precautions/ restrictions.  Pt reports poor tolerance with reaching/ lifting/ dressing-grooming activities. Signs and symptoms are consistent with physical therapy diagnosis of (R) shoulder pain  Prognosis: good  Functional Limitations: carrying objects, lifting, walking, pulling, uncomfortable because of pain, standing, stooping and reaching behind back    Prognosis: good  Functional Limitations: carrying objects, lifting, sleeping, walking, uncomfortable because of pain, standing, stooping, reaching behind back, reaching overhead and unable to perform repetitive tasks  Goals  Plan Goals: Goals  Plan Goals: stg 6 wks    Pt to be educated/ independent with precautions/ restrictions. met    Increase (R) shoulder PROM in flexion 160 / abduction 160 to allow for increased ease with dressing/grooming/bathing activities. Not met    Pt to deny pain at rest in (R) shoulder ongoing    ltg    Decreased ttp (R) supraspinatus/ bicipital groove from severe to minimal to allow for increased ease with dressing/ grooming / bathing activities. ongoing      Increased (R) shoulder AROM 90 degrees in flexion/ abduction without apprehension to allow for increased ease with modified reaching/ dressing bathing activities not met    Increased (R) shoulder MMT to 4-/5 at 90 degrees to allow for increased ease with modified lifting task without exacerbation of pain greater than 3/ 10 consistently. Not met    Plan  Therapy options: will be seen for skilled physical therapy services  Planned modality interventions: cryotherapy, electrical stimulation/Russian stimulation, TENS, ultrasound and thermotherapy (hydrocollator packs)  Planned therapy interventions: abdominal trunk stabilization, manual therapy, motor coordination training, neuromuscular re-education, balance/weight-bearing training, ADL retraining, flexibility, functional ROM exercises, gait training, home exercise program, joint mobilization,  transfer training, therapeutic activities, strengthening, stretching and soft tissue mobilization  Frequency: 2x week  Duration in weeks: 8  Treatment plan discussed with: patient           Recommendations: reassessment per orthopeuticconsult  Timeframe: 1 month  Prognosis to achieve goals: good    PT Signature: Joe Carey, PT      Based upon review of the patient's progress and continued therapy plan, it is my medical opinion that Lynda Rueda should continue physical therapy treatment at [facility]  [address] [place] [address] [phone].    Signature: __________________________________  Radha Allen APRN    Timed:  Manual Therapy:         mins  61816;  Therapeutic Exercise:       30  mins  37901;     Neuromuscular Erica:        mins  41082;    Therapeutic Activity:          mins  94288;     Gait Training:           mins  65695;     Ultrasound:          mins  00760;    Electrical Stimulation:         mins  46342 ( );  Iontophoresis         mins 46449;  Dry Needling                   mins    Untimed:  Electrical Stimulation:         mins  98133 ( );  Mechanical Traction:         mins  39437;     Timed Treatment:30      mins   Total Treatment:     30   mins

## 2020-10-26 ENCOUNTER — OFFICE VISIT (OUTPATIENT)
Dept: ORTHOPEDIC SURGERY | Facility: CLINIC | Age: 60
End: 2020-10-26

## 2020-10-26 VITALS — TEMPERATURE: 95.9 F | BODY MASS INDEX: 36.82 KG/M2 | HEIGHT: 61 IN | WEIGHT: 195 LBS

## 2020-10-26 DIAGNOSIS — S49.91XD INJURY OF RIGHT SHOULDER, SUBSEQUENT ENCOUNTER: Primary | ICD-10-CM

## 2020-10-26 PROCEDURE — 99212 OFFICE O/P EST SF 10 MIN: CPT | Performed by: NURSE PRACTITIONER

## 2020-10-26 RX ORDER — CITALOPRAM 40 MG/1
40 TABLET ORAL EVERY MORNING
COMMUNITY
Start: 2020-10-22 | End: 2023-02-03 | Stop reason: SDUPTHER

## 2020-10-26 RX ORDER — ESTRADIOL 10 UG/1
TABLET VAGINAL
COMMUNITY
Start: 2020-10-09 | End: 2021-01-27

## 2020-10-26 NOTE — PROGRESS NOTES
"Chief Complaint:  Follow up right shoulder pain    HPI:  Ms. Rueda returns today for follow up of right shoulder pain.  She continues to have significant pain.  Reports the injection and meloxicam did not help.  She has tried 5 weeks of PT without improvement.    Vitals:    10/26/20 1608   Temp: 95.9 °F (35.5 °C)   Weight: 88.5 kg (195 lb)   Height: 154.9 cm (61\")     Exam:  The right shoulder is briefly examined.  Focal tenderness to palpation over the anterior shoulder and rotator cuff interval.  ROM is limited due to pain.  Her exam is very guarded.  Crepitus with passive range of motion.  Weakness with forward elevation.          Imaging:  None taken    Assessment:  Right shoulder injury, possible rotator cuff tear    Plan:  Again, we discussed the natural history of rotator cuff tears and risk of potential tear progression.  She has tried and failed conservative treatment.  I recommended getting a MRI for further evaluation.  She agreed.  I have given her a referral for this.  I will call her with the results and come up with a plan at that time.      Radha Allen, APRN     10/26/2020    "

## 2020-11-05 ENCOUNTER — HOSPITAL ENCOUNTER (OUTPATIENT)
Dept: MRI IMAGING | Facility: HOSPITAL | Age: 60
Discharge: HOME | End: 2020-11-05
Admitting: NURSE PRACTITIONER

## 2020-11-05 DIAGNOSIS — S49.91XD INJURY OF RIGHT SHOULDER, SUBSEQUENT ENCOUNTER: ICD-10-CM

## 2020-11-05 PROCEDURE — 73221 MRI JOINT UPR EXTREM W/O DYE: CPT

## 2020-11-06 ENCOUNTER — TELEPHONE (OUTPATIENT)
Dept: ORTHOPEDIC SURGERY | Facility: CLINIC | Age: 60
End: 2020-11-06

## 2020-11-06 NOTE — TELEPHONE ENCOUNTER
Left message requesting a return call.  Attempting to provide patient with right shoulder MRI results.  I will be out of the office next week.  She may speak to Dr. Major if she wants the results before I return.

## 2020-11-09 ENCOUNTER — TELEPHONE (OUTPATIENT)
Dept: ORTHOPEDIC SURGERY | Facility: CLINIC | Age: 60
End: 2020-11-09

## 2020-11-09 ENCOUNTER — APPOINTMENT (OUTPATIENT)
Dept: MRI IMAGING | Facility: HOSPITAL | Age: 60
End: 2020-11-09

## 2020-11-09 NOTE — TELEPHONE ENCOUNTER
Caller: PATIENT    Relationship: SELF    Best call back number: [phone]    Caller requesting test results: PATIENT    What test was performed: MRI    When was the test performed: 11.05.20    Where was the test performed: [facility]    Additional notes: PATIENT SAW GABRIELLE MANDUJANO AT [place], HAD MRI ON RIGHT SHOULDER - WAS TOLD GABRIELLE WOULD BE OUT OF THE OFFICE THIS WEEK AND TO ASK FOR DR ORTIZ TO SHARE THE MRI RESULTS, PT IS WANTING TO KNOW WHAT THE RESULTS ARE OR WHEN THEY'LL BE AVAILABLE. PLEASE CALL HER BACK AT THE NUMBER ABOVE.

## 2020-11-09 NOTE — TELEPHONE ENCOUNTER
I spoke with her and gave her the MRI results.  I do not see anything that would necessarily need surgery at this time.  She insists that she has already tried an injection and some therapy.  I would like to see her and examine her to get a better idea of where her pain is coming from.  I will have the office call her to set this up.

## 2020-11-11 ENCOUNTER — OFFICE VISIT (OUTPATIENT)
Dept: ORTHOPEDIC SURGERY | Facility: CLINIC | Age: 60
End: 2020-11-11

## 2020-11-11 VITALS — HEIGHT: 61 IN | WEIGHT: 195 LBS | BODY MASS INDEX: 36.82 KG/M2 | TEMPERATURE: 97.5 F

## 2020-11-11 DIAGNOSIS — M25.511 CHRONIC RIGHT SHOULDER PAIN: Primary | ICD-10-CM

## 2020-11-11 DIAGNOSIS — G89.29 CHRONIC RIGHT SHOULDER PAIN: Primary | ICD-10-CM

## 2020-11-11 PROCEDURE — 20610 DRAIN/INJ JOINT/BURSA W/O US: CPT | Performed by: ORTHOPAEDIC SURGERY

## 2020-11-11 PROCEDURE — 99214 OFFICE O/P EST MOD 30 MIN: CPT | Performed by: ORTHOPAEDIC SURGERY

## 2020-11-11 RX ORDER — METHYLPREDNISOLONE ACETATE 80 MG/ML
80 INJECTION, SUSPENSION INTRA-ARTICULAR; INTRALESIONAL; INTRAMUSCULAR; SOFT TISSUE
Status: COMPLETED | OUTPATIENT
Start: 2020-11-11 | End: 2020-11-11

## 2020-11-11 RX ADMIN — METHYLPREDNISOLONE ACETATE 80 MG: 80 INJECTION, SUSPENSION INTRA-ARTICULAR; INTRALESIONAL; INTRAMUSCULAR; SOFT TISSUE at 09:59

## 2020-11-11 NOTE — PROGRESS NOTES
Patient: Lynda Rueda    YOB: 1960    Medical Record Number: [id number]    Chief Complaints:   Referral for right shoulder pain    History of Present Illness:     60 y.o. female patient who presents with a complaint of right shoulder pain.  She reports that the symptoms first started about 2-1/2 months ago.  She apparently fell while hiking in [place] and aggravated the shoulder.  She saw Radha for this issue.  She had an injection.  The injection did not really help all that much.  She tried roughly 6 weeks of therapy and a prescription strength anti-inflammatory.  Neither of these really seem to help all that much either.  Pain is moderate, constant and aching.  The pain is worse with certain reaching and lifting movements, particularly overhead.  She denies any alleviating factors.  She denies any shooting pain down the arm, weakness, numbness or paresthesias.    Allergies:   Allergies   Allergen Reactions   • Celecoxib Hives   • Methocarbamol Hives     ROBAXIN    • Montelukast Hives   • Penicillins Hives   • Carbomer Other (See Comments)     UNKNOWN REACTION   • [place] Other (See Comments)     UNKNOWN PER PT   • Gemifloxacin    • Nickel Other (See Comments)     WHEN PT TOOK THE ALLERGY TEST NICKEL, ACRYLIC AND COBALT CAME UP AS ALLERGIES   • Tamiflu [Oseltamivir Phosphate] Rash       Medications:   Home Medications:    Current Outpatient Medications:   •  acetaminophen (TYLENOL) 500 MG tablet, Take 1,000 mg by mouth Every 6 (Six) Hours As Needed for Mild Pain ., Disp: , Rfl:   •  citalopram (CeleXA) 20 MG tablet, Take 1 tablet by mouth Daily., Disp: 90 tablet, Rfl: 0  •  citalopram (CeleXA) 40 MG tablet, TAKE 1 TABLET BY MOUTH EVERY DAY, Disp: , Rfl:   •  Empagliflozin (JARDIANCE) 25 MG tablet, Take  by mouth Daily., Disp: , Rfl:   •  fenofibrate 160 MG tablet, TAKE 1 TABLET BY MOUTH EVERY DAY, Disp: 90 tablet, Rfl: 1  •  glipiZIDE (GLUCOTROL) 10 MG tablet, Take 1 tablet (10 mg  total) by mouth 2 (two) times a day. (Patient taking differently: Take 10 mg by mouth Daily.), Disp: 180 tablet, Rfl: 2  •  glucose blood test strip, OneTouch Ultra Blue In Vitro Strip; Patient Sig: OneTouch Ultra Blue In Vitro Strip ; 100; 0; 2013; Active, Disp: , Rfl:   •  ibuprofen (ADVIL,MOTRIN) 600 MG tablet, Take 600 mg by mouth Every 6 (Six) Hours As Needed for Mild Pain ., Disp: , Rfl:   •  linagliptin (TRADJENTA) 5 MG tablet tablet, Take 5 mg by mouth., Disp: , Rfl:   •  meloxicam (Mobic) 15 MG tablet, Take 1 tablet by mouth Daily., Disp: 30 tablet, Rfl: 1  •  metFORMIN ER (GLUCOPHAGE-XR) 500 MG 24 hr tablet, Take 1,000 mg by mouth 2 (Two) Times a Day., Disp: , Rfl:   •  metoprolol succinate XL (TOPROL-XL) 50 MG 24 hr tablet, TAKE 1 TABLET BY MOUTH EVERY DAY**NEEDS TO MAKE APPT**, Disp: 90 tablet, Rfl: 0  •  pantoprazole (PROTONIX) 40 MG EC tablet, Take 1 tablet by mouth Daily., Disp: 90 tablet, Rfl: 1  •  rosuvastatin (CRESTOR) 20 MG tablet, TAKE 1 TABLET BY MOUTH EVERY DAY, Disp: 30 tablet, Rfl: 0  •  SITagliptin (JANUVIA) 100 MG tablet, Take 100 mg by mouth Daily., Disp: , Rfl:   •  SYNTHROID 125 MCG tablet, , Disp: , Rfl:   •  vitamin D (ERGOCALCIFEROL) 67824 UNITS capsule capsule, , Disp: , Rfl:   •  Yuvafem 10 MCG tablet vaginal tablet, , Disp: , Rfl:     Past Medical History:   Diagnosis Date   • Anxiety    • Diabetes (CMS/HCC)    • GERD (gastroesophageal reflux disease)    • H/O colonoscopy 2012   • Hyperlipidemia    • Hypertension    • Low back pain    • PONV (postoperative nausea and vomiting)    • Thyroid disease        Past Surgical History:   Procedure Laterality Date   • APPENDECTOMY     • BREAST BIOPSY     •  SECTION     • CHOLECYSTECTOMY     • COLONOSCOPY N/A 6/15/2018    Procedure: COLONOSCOPY INTO CECUM AND T.I. WITH BIOPSIES AND COLD BIOPSY POLYPECTOMY;  Surgeon: Sunil Cardona MD;  Location: [facility] ENDOSCOPY;  Service: Gastroenterology   • DILATATION AND CURETTAGE   "   • HYSTERECTOMY  2007    OVARIAN MASS/SMALL INTESTINE   • KNEE SURGERY Left 2013   • OVARIAN CYST REMOVAL  2004   • THYROIDECTOMY  07/2016       Social History     Occupational History   • Not on file   Tobacco Use   • Smoking status: Never Smoker   • Smokeless tobacco: Never Used   Substance and Sexual Activity   • Alcohol use: Yes     Comment: SOCIAL DRINKER    • Drug use: Defer   • Sexual activity: Defer      Social History     Social History Narrative    LIVES WITH SPOUSE       Family History   Problem Relation Age of Onset   • COPD Mother    • Hyperlipidemia Mother    • Hypertension Mother    • Melanoma Mother    • Heart failure Mother    • Transient ischemic attack Mother    • Colon cancer Father    • No Known Problems Sister    • Heart attack Brother        Review of Systems:      Constitutional: Denies fever, shaking or chills   Eyes: Denies change in visual acuity   HEENT: Denies nasal congestion or sore throat   Respiratory: Denies cough or shortness of breath   Cardiovascular: Denies chest pain or edema  Endocrine: Denies tremors, palpitations, intolerance of heat or cold, polyuria, polydipsia.  GI: Denies abdominal pain, nausea, vomiting, bloody stools or diarrhea  : Denies frequency, urgency, incontinence, retention, or nocturia.  Musculoskeletal: Denies numbness, tingling or loss of motor function except as above  Integument: Denies rash, lesion or ulceration   Neurologic: Denies headache or focal weakness, deficits  Heme: Denies spontaneous or excessive bleeding, epistaxis, hematuria, melena, fatigue, enlarged or tender lymph nodes.      All other pertinent positives and negatives as noted above in HPI.    Physical Exam:     60 y.o. female  Vitals:    11/11/20 0940   Temp: 97.5 °F (36.4 °C)   TempSrc: Temporal   Weight: 88.5 kg (195 lb)   Height: 154.9 cm (61\")     General:  Patient is awake and alert.  Appears in no acute distress or discomfort.    Psych:  Affect and demeanor are " appropriate.    Eyes:  Conjunctiva and sclera appear grossly normal.  Eyes track well and EOM seem to be intact.    Ears:  No gross abnormalities.  Hearing adequate for the exam.    Dentition:  No gross abnormalities noted.    Cardiovascular:  Regular rate and rhythm.    Lungs:  Good chest expansion.  Breathing unlabored.    Lymph:  No palpable adenopathy about neck or axilla.    Neck:  Supple.  Normal ROM.  Negative Spurling's for shoulder or arm pain.    Right shoulder is examined.  Skin is benign.  No gross abnormalities on inspection including any atrophy, swellings, or masses.  No palpable masses or adenopathy. No focal tenderness.  Full shoulder motion.  No evident instability or apprehension.  Markedly positive Neer and Lewis manuevers.  5- out of 5 strength with elevation in the scapular plane and abduction.  Internal and external rotation with the arm at the side are well-tolerated and she has excellent strength.  Good strength in her biceps, triceps, and .  Intact sensation throughout the arm.  Palpable radial pulse.  Good skin turgor.  Brisk cap refill.         Radiology:  Her previous AP, scapular Y, and axillary views of the right shoulder are reviewed to evaluate the patient's complaint.  No comparison films are immediately available.  The x-rays show no obvious acute abnormalities, lesions, masses, significant glenohumeral degenerative changes, or other concerning findings.  The acromiohumeral interval is normal.  Glenoid version appears normal as well.    MRI of her right shoulder is reviewed along with the report.  Findings are listed below.    IMPRESSION:  1. Small partial-thickness bursal surface tear of the supraspinatus  tendon 15 mm proximal to its insertion with fraying of bursal surface  fibers. Supraspinatus and infraspinatus tendinopathy. No full-thickness  rotator cuff tear or retraction.  2. Fluid distention subacromial-subdeltoid bursa representing bursitis.  Mild AC joint  arthritis. Small acromial spur    Assessment/Plan:   Right shoulder bursitis and partial thickness rotator cuff tear    It looks like she has a low-grade partial-thickness bursal sided tear.  I told her that I think her prognosis is good with conservative treatment.  Certainly if that fails, we can always consider surgical options but I do not see any absolute indication for surgery based on her MRI.  I told her I would recommend trying 1 more injection and a little more time with the therapy.  If that fails, then we may have to consider an arthroscopy for her.  We discussed it and she is in agreement with that plan.  The risks, benefits and alternatives to the injection were thoroughly discussed.  She acknowledged understanding and consented.  The injection was performed as described below.  I have entered another referral to physical therapy.  She will follow-up with me if the symptoms persist or recur.    Juan Pablo Major MD    11/11/2020    CC to Renoz Avila MD     Large Joint Arthrocentesis: R subacromial bursa  Date/Time: 11/11/2020 9:59 AM  Consent given by: patient  Site marked: site marked  Timeout: Immediately prior to procedure a time out was called to verify the correct patient, procedure, equipment, support staff and site/side marked as required   Supporting Documentation  Indications: pain   Procedure Details  Location: shoulder - R subacromial bursa  Preparation: Patient was prepped and draped in the usual sterile fashion  Needle gauge: 21 G.  Approach: posterior  Medications administered: 2 mL lidocaine (cardiac); 80 mg methylPREDNISolone acetate 80 MG/ML  Patient tolerance: patient tolerated the procedure well with no immediate complications

## 2020-11-13 ENCOUNTER — TREATMENT (OUTPATIENT)
Dept: PHYSICAL THERAPY | Facility: CLINIC | Age: 60
End: 2020-11-13

## 2020-11-13 DIAGNOSIS — M25.511 ACUTE PAIN OF RIGHT SHOULDER: Primary | ICD-10-CM

## 2020-11-13 PROCEDURE — 97164 PT RE-EVAL EST PLAN CARE: CPT | Performed by: PHYSICAL THERAPIST

## 2020-11-13 PROCEDURE — 97110 THERAPEUTIC EXERCISES: CPT | Performed by: PHYSICAL THERAPIST

## 2020-11-15 NOTE — PROGRESS NOTES
Re-Assessment / Re-Certification        Patient: Lynda Rueda   : 1960  Diagnosis/ICD-10 Code:  Acute pain of right shoulder [M25.511]  Referring practitioner: Juan Pablo Major MD  Date of Initial Visit: Type: THERAPY  Noted: 2020  Today's Date: 11/15/2020  Patient seen for 11 sessions      Subjective:     Clinical Progress: unchanged  Home Program Compliance: Yes  Treatment has included:  therapeutic exercise    Subjective Evaluation    History of Present Illness    Subjective comment: pt reporting progressive (R) shoulder pain from a FOSH 3-4 months ago.  pt recently had 2 steroid injection with mild-moderate benefit.  (+) MRI 30% suprapsinatus tear.Quality of life: good    Pain  Current pain ratin  At best pain ratin  At worst pain ratin  Quality: tight, sharp, radiating and dull ache  Aggravating factors: outstretched reach, repetitive movement and movement    Patient Goals  Patient goals for therapy: decreased pain, increased strength, independence with ADLs/IADLs, improved balance and increased motion         Objective   See Exercise, Manual, and Modality Logs for complete treatment.       Assessment & Plan     Assessment  Impairments: abnormal gait, abnormal or restricted ROM, activity intolerance, impaired balance, impaired physical strength, pain with function and safety issue  Assessment details: Lynda Rueda is a 59 y.o. year-old female referred to physical therapy for (R) shoulder pain. She presents with a stable clinical presentation.  She has no comorbidities and no personal factors  that may affect her progress in the plan of care.  Pt has been seen in PT For 8 wks for the evaluation/ treatment of (R) shoudler pain 12 wks post (R)  FOSH .  Pt reports 6/ 10 (R) shoulder pain. Pt had a injection into shoulder x2 with minimal- moderate benefit. (-) xray (R) shoulder/ (+) MRI 30% supraspinatus tear.  Pt demonstrates poor tolerance with (R) shoulder AROM with  apprehension;  Pain with IR AROM.(R) shoulder PROM in flexion 145/ ab 115 severe ttp (R) supraspinatus/ bicipital groove. (+) speeds/ (+)Obrient;  MMT (R) shoulder in flexion 3+/5, ab 3+/5, scap 3+/5, ER 4-/5, bicep 3+/5 all painful.  Pt denies acute pain at night.  Pt has not responded well with PT services and is appropriate for reassessment per orthopedic continue   PT .Pt agrees with POC and has been compliant with all PT services/ along with precautions/ restrictions.  Pt reports poor tolerance with reaching/ lifting/ dressing-grooming activities. Signs and symptoms are consistent with physical therapy diagnosis of (R) shoulder pain/ internal derrangement  Prognosis: good  Functional Limitations: carrying objects, lifting, walking, pulling, uncomfortable because of pain, standing, stooping and reaching behind back    Prognosis: good  Functional Limitations: carrying objects, lifting, sleeping, walking, uncomfortable because of pain, standing, stooping, reaching behind back, reaching overhead and unable to perform repetitive tasks    Prognosis: good  Functional Limitations: lifting, walking, uncomfortable because of pain, standing, stooping and reaching behind back  Goals  Plan Goals: Goals  Plan Goals: Goals  Plan Goals: stg 6 wks    Pt to be educated/ independent with precautions/ restrictions.     Increase (R) shoulder PROM in flexion 170 / abduction 170 to allow for increased ease with dressing/grooming/bathing activities. Not met    Pt to deny pain at rest in (R) shoulder     ltg    Decreased ttp (R) supraspinatus/ bicipital groove from severe to minimal to allow for increased ease with dressing/ grooming / bathing activities.       Increased (R) shoulder AROM 160 degrees in flexion/ abduction without apprehension to allow for increased ease with modified reaching/ dressing bathing activities     Increased (R) shoulder MMT to 4/5 at 90 degrees to allow for increased ease with modified lifting task without  exacerbation of pain greater than 3/ 10 consistently.      Plan  Therapy options: will be seen for skilled physical therapy services  Planned modality interventions: cryotherapy, electrical stimulation/Russian stimulation, TENS, ultrasound and thermotherapy (hydrocollator packs)  Planned therapy interventions: abdominal trunk stabilization, manual therapy, motor coordination training, neuromuscular re-education, balance/weight-bearing training, ADL retraining, flexibility, functional ROM exercises, gait training, home exercise program, joint mobilization, transfer training, therapeutic activities, strengthening, stretching and soft tissue mobilization  Frequency: 2x week  Duration in weeks: 12  Treatment plan discussed with: patient           Recommendations: Continue as planned  Timeframe: 1 month  Prognosis to achieve goals: good    PT Signature: Joe Carey, PT      Based upon review of the patient's progress and continued therapy plan, it is my medical opinion that Lynda Rueda should continue physical therapy treatment at [facility]  [address]   [place] [address]  [phone].    Signature: __________________________________  Juan Pablo Major MD    Timed:  Manual Therapy:        mins  23119;  Therapeutic Exercise:   35     mins  24969;     Neuromuscular Erica:        mins  02266;    Therapeutic Activity:          mins  30408;     Gait Training:           mins  79104;     Ultrasound:          mins  54890;    Electrical Stimulation:         mins  85125 ( );  Iontophoresis         mins 37622;  Dry Needling                   mins    Untimed:  Electrical Stimulation:         mins  23866 ( );  Mechanical Traction:         mins  58400;     Timed Treatment: 35    mins   Total Treatment:    45    mins

## 2020-11-27 ENCOUNTER — TREATMENT (OUTPATIENT)
Dept: PHYSICAL THERAPY | Facility: CLINIC | Age: 60
End: 2020-11-27

## 2020-11-27 DIAGNOSIS — M25.511 ACUTE PAIN OF RIGHT SHOULDER: Primary | ICD-10-CM

## 2020-11-27 PROCEDURE — 97110 THERAPEUTIC EXERCISES: CPT | Performed by: PHYSICAL THERAPIST

## 2020-11-27 PROCEDURE — 97530 THERAPEUTIC ACTIVITIES: CPT | Performed by: PHYSICAL THERAPIST

## 2020-11-27 NOTE — PROGRESS NOTES
Physical Therapy Daily Progress Note    Patient: Lynda Rueda   : 1960  Diagnosis/ICD-10 Code:  Acute pain of right shoulder [M25.511]  Referring practitioner: Juan Pablo Major MD  Date of Initial Visit: Type: THERAPY  Noted: 2020  Today's Date: 2020  Patient seen for 12 sessions           Subjective   Shot finally kicked in feel like im 70% improved  Objective   See Exercise, Manual, and Modality Logs for complete treatment.       Assessment/Plan    PROM (R) shoulder flexion 165/ ab 165;  Decreased apprehension with AROM below 90 degrees.   Implemented mild RC-scapular stabilization specific to pulling .  Improved PROM/ AAROM/ decreased pain. cautiously optimistic.        Timed:    Manual Therapy:        mins  02563;  Therapeutic Exercise:   10      mins  99739;     Neuromuscular Erica:        mins  42373;    Therapeutic Activity:      15    mins  45782;     Gait Training:           mins  21800;     Ultrasound:          mins  60117;    Electrical Stimulation:         mins  09698 ( );  Iontophoresis         mins 48567;  Aquatic Therapy         mins 90898;  Dry Needling                   mins    Untimed:  Electrical Stimulation:         mins  09010 ( );  Mechanical Traction:         mins  52441;     Timed Treatment:   25   mins   Total Treatment:     25   mins  Joe Carey PT  Physical Therapist

## 2020-11-30 ENCOUNTER — TREATMENT (OUTPATIENT)
Dept: PHYSICAL THERAPY | Facility: CLINIC | Age: 60
End: 2020-11-30

## 2020-11-30 DIAGNOSIS — M25.511 ACUTE PAIN OF RIGHT SHOULDER: Primary | ICD-10-CM

## 2020-11-30 PROCEDURE — 97530 THERAPEUTIC ACTIVITIES: CPT | Performed by: PHYSICAL THERAPIST

## 2020-11-30 PROCEDURE — 97110 THERAPEUTIC EXERCISES: CPT | Performed by: PHYSICAL THERAPIST

## 2020-11-30 NOTE — PROGRESS NOTES
Physical Therapy Daily Progress Note    Patient: Lynda Rueda   : 1960  Diagnosis/ICD-10 Code:  Acute pain of right shoulder [M25.511]  Referring practitioner: Juan Pablo Major MD  Date of Initial Visit: Type: THERAPY  Noted: 2020  Today's Date: 2020  Patient seen for 13 sessions           Subjective feels like im using it more with much less pain. Encouraged with progress in the last week.     Objective   See Exercise, Manual, and Modality Logs for complete treatment.       Assessment/Plan  Progressed with shoulder AROM/ AAROM; implemented RC-scapular stabilization below 90 degrees.  No pain with pre/ fatigued at conclusion.  Encourage with progressed specific to the last two weeks.  Mild cuing with HEP.          Timed:    Manual Therapy:       mins  94400;  Therapeutic Exercise:       10  mins  79600;     Neuromuscular Erica:        mins  56964;    Therapeutic Activity:   30       mins  14668;     Gait Training:           mins  94767;     Ultrasound:          mins  09735;    Electrical Stimulation:         mins  19203 (MC );  Iontophoresis         mins 90798;  Aquatic Therapy         mins 96963;  Dry Needling                   mins    Untimed:  Electrical Stimulation:         mins  69157 (MC );  Mechanical Traction:         mins  62668;     Timed Treatment: 40     mins   Total Treatment:   40     mins  Joe Carey PT  Physical Therapist

## 2020-12-04 ENCOUNTER — TREATMENT (OUTPATIENT)
Dept: PHYSICAL THERAPY | Facility: CLINIC | Age: 60
End: 2020-12-04

## 2020-12-04 DIAGNOSIS — M25.511 ACUTE PAIN OF RIGHT SHOULDER: Primary | ICD-10-CM

## 2020-12-04 PROCEDURE — 97110 THERAPEUTIC EXERCISES: CPT | Performed by: PHYSICAL THERAPIST

## 2020-12-04 NOTE — PROGRESS NOTES
Physical Therapy Daily Progress Note    Patient: Lynda Rueda   : 1960  Diagnosis/ICD-10 Code:  Acute pain of right shoulder [M25.511]  Referring practitioner: Juan Pablo Major MD  Date of Initial Visit: Type: THERAPY  Noted: 2020  Today's Date: 2020  Patient seen for 14 sessions           Subjective shoulder is hurting today    Objective   See Exercise, Manual, and Modality Logs for complete treatment.       Assessment/Plan  decxreased tolerance with PROM/ AAROM this session.  110 flexion PROM/ 85 abduction. Reassess next week to determine appropriate poc. (+) night pain.          Timed:    Manual Therapy:       mins  85961;  Therapeutic Exercise:   35      mins  18051;     Neuromuscular Erica:        mins  90179;    Therapeutic Activity:          mins  63880;     Gait Training:           mins  92590;     Ultrasound:          mins  66826;    Electrical Stimulation:         mins  33698 ( );  Iontophoresis         mins 30236;  Aquatic Therapy         mins 94966;  Dry Needling                   mins    Untimed:  Electrical Stimulation:         mins  82627 ( );  Mechanical Traction:         mins  06869;     Timed Treatment:    35  mins   Total Treatment:   35     mins  Joe Carey, PT  Physical Therapist

## 2020-12-07 ENCOUNTER — TREATMENT (OUTPATIENT)
Dept: PHYSICAL THERAPY | Facility: CLINIC | Age: 60
End: 2020-12-07

## 2020-12-07 DIAGNOSIS — M25.511 ACUTE PAIN OF RIGHT SHOULDER: Primary | ICD-10-CM

## 2020-12-07 PROCEDURE — 97530 THERAPEUTIC ACTIVITIES: CPT | Performed by: PHYSICAL THERAPIST

## 2020-12-07 PROCEDURE — 97110 THERAPEUTIC EXERCISES: CPT | Performed by: PHYSICAL THERAPIST

## 2020-12-07 NOTE — PROGRESS NOTES
Physical Therapy Daily Progress Note    Patient: Lynda Rueda   : 1960  Diagnosis/ICD-10 Code:  Acute pain of right shoulder [M25.511]  Referring practitioner: Juan Pablo Major MD  Date of Initial Visit: Type: THERAPY  Noted: 2020  Today's Date: 2020  Patient seen for 15 sessions           Subjective should hurts taking pain medication. I feel like shot is wearing off.    Objective   See Exercise, Manual, and Modality Logs for complete treatment.       Assessment/Plan    (+) night pain. (+) speeds/ (+)/ webb/ (+) HK; moderate ttp bicipital groove/ supraspinatus.  (+) painful arc;  Unable to progress pt secondary to pain. Functional of shoulder doesn't seem to be improving in a convincing  manner in the last 2-3 wks.  Encouraged pt to follow up with ortho for further assessment; s/s consistent with internal derangement .  Possible sx candididate per MD discretion.        Timed:    Manual Therapy:       mins  97631;  Therapeutic Exercise:15         mins  29612;     Neuromuscular Erica:        mins  94586;    Therapeutic Activity:  15        mins  00922;     Gait Training:           mins  07116;     Ultrasound:          mins  97128;    Electrical Stimulation:         mins  19561 ( );  Iontophoresis         mins 63533;  Aquatic Therapy         mins 42191;  Dry Needling                   mins    Untimed:  Electrical Stimulation:         mins  92828 ( );  Mechanical Traction:         mins  34145;     Timed Treatment:  30    mins   Total Treatment:     30   mins  Joe Carey, PT  Physical Therapist

## 2021-01-18 ENCOUNTER — OFFICE VISIT (OUTPATIENT)
Dept: ORTHOPEDIC SURGERY | Facility: CLINIC | Age: 61
End: 2021-01-18

## 2021-01-18 VITALS — WEIGHT: 195 LBS | TEMPERATURE: 97.2 F | HEIGHT: 61 IN | BODY MASS INDEX: 36.82 KG/M2

## 2021-01-18 DIAGNOSIS — M25.511 CHRONIC RIGHT SHOULDER PAIN: Primary | ICD-10-CM

## 2021-01-18 DIAGNOSIS — G89.29 CHRONIC RIGHT SHOULDER PAIN: Primary | ICD-10-CM

## 2021-01-18 PROCEDURE — 99214 OFFICE O/P EST MOD 30 MIN: CPT | Performed by: ORTHOPAEDIC SURGERY

## 2021-01-18 RX ORDER — CEFAZOLIN SODIUM 2 G/100ML
2 INJECTION, SOLUTION INTRAVENOUS ONCE
Status: CANCELLED | OUTPATIENT
Start: 2021-02-09 | End: 2021-01-18

## 2021-01-18 NOTE — PROGRESS NOTES
Medical Record Number: [id number]    Chief Complaints: Follow-up regarding right shoulder pain    History of Present Illness:     60 y.o. female patient who presents for follow-up of the right shoulder.  The last injection helped but it wore off after about 3 weeks.  She tried therapy but did not really feel like she was making any progress.  She reports persistent pain and weakness.  She reports night pain and difficulty sleeping.  She is very frustrated.  She comes in today wanting to discuss surgical options.    Allergies:   Allergies   Allergen Reactions   • Celecoxib Hives   • Methocarbamol Hives     ROBAXIN    • Montelukast Hives   • Penicillins Hives   • Carbomer Other (See Comments)     UNKNOWN REACTION   • Zarephath Other (See Comments)     UNKNOWN PER PT   • Gemifloxacin    • Nickel Other (See Comments)     WHEN PT TOOK THE ALLERGY TEST NICKEL, ACRYLIC AND COBALT CAME UP AS ALLERGIES   • Tamiflu [Oseltamivir Phosphate] Rash       Home Medications:    Current Outpatient Medications:   •  acetaminophen (TYLENOL) 500 MG tablet, Take 1,000 mg by mouth Every 6 (Six) Hours As Needed for Mild Pain ., Disp: , Rfl:   •  citalopram (CeleXA) 20 MG tablet, Take 1 tablet by mouth Daily., Disp: 90 tablet, Rfl: 0  •  citalopram (CeleXA) 40 MG tablet, TAKE 1 TABLET BY MOUTH EVERY DAY, Disp: , Rfl:   •  Empagliflozin (JARDIANCE) 25 MG tablet, Take  by mouth Daily., Disp: , Rfl:   •  fenofibrate 160 MG tablet, TAKE 1 TABLET BY MOUTH EVERY DAY, Disp: 90 tablet, Rfl: 1  •  glipiZIDE (GLUCOTROL) 10 MG tablet, Take 1 tablet (10 mg total) by mouth 2 (two) times a day. (Patient taking differently: Take 10 mg by mouth Daily.), Disp: 180 tablet, Rfl: 2  •  glucose blood test strip, OneTouch Ultra Blue In Vitro Strip; Patient Sig: OneTouch Ultra Blue In Vitro Strip ; 100; 0; 11-Nov-2013; Active, Disp: , Rfl:   •  ibuprofen (ADVIL,MOTRIN) 600 MG tablet, Take 600 mg by mouth Every 6 (Six) Hours As Needed for Mild Pain ., Disp: , Rfl:    •  linagliptin (TRADJENTA) 5 MG tablet tablet, Take 5 mg by mouth., Disp: , Rfl:   •  meloxicam (Mobic) 15 MG tablet, Take 1 tablet by mouth Daily., Disp: 30 tablet, Rfl: 1  •  metFORMIN ER (GLUCOPHAGE-XR) 500 MG 24 hr tablet, Take 1,000 mg by mouth 2 (Two) Times a Day., Disp: , Rfl:   •  metoprolol succinate XL (TOPROL-XL) 50 MG 24 hr tablet, TAKE 1 TABLET BY MOUTH EVERY DAY**NEEDS TO MAKE APPT**, Disp: 90 tablet, Rfl: 0  •  pantoprazole (PROTONIX) 40 MG EC tablet, Take 1 tablet by mouth Daily., Disp: 90 tablet, Rfl: 1  •  rosuvastatin (CRESTOR) 20 MG tablet, TAKE 1 TABLET BY MOUTH EVERY DAY, Disp: 30 tablet, Rfl: 0  •  SITagliptin (JANUVIA) 100 MG tablet, Take 100 mg by mouth Daily., Disp: , Rfl:   •  SYNTHROID 125 MCG tablet, , Disp: , Rfl:   •  vitamin D (ERGOCALCIFEROL) 42937 UNITS capsule capsule, , Disp: , Rfl:   •  Yuvafem 10 MCG tablet vaginal tablet, , Disp: , Rfl:     Past Medical History:   Diagnosis Date   • Anxiety    • Diabetes (CMS/HCC)    • GERD (gastroesophageal reflux disease)    • H/O colonoscopy 2012   • Hyperlipidemia    • Hypertension    • Low back pain    • PONV (postoperative nausea and vomiting)    • Thyroid disease        Past Surgical History:   Procedure Laterality Date   • APPENDECTOMY     • BREAST BIOPSY     •  SECTION     • CHOLECYSTECTOMY     • COLONOSCOPY N/A 6/15/2018    Procedure: COLONOSCOPY INTO CECUM AND T.I. WITH BIOPSIES AND COLD BIOPSY POLYPECTOMY;  Surgeon: Sunil Cardona MD;  Location: [facility] ENDOSCOPY;  Service: Gastroenterology   • DILATATION AND CURETTAGE     • HYSTERECTOMY  2007    OVARIAN MASS/SMALL INTESTINE   • KNEE SURGERY Left    • OVARIAN CYST REMOVAL     • THYROIDECTOMY  2016       Social History     Occupational History   • Not on file   Tobacco Use   • Smoking status: Never Smoker   • Smokeless tobacco: Never Used   Substance and Sexual Activity   • Alcohol use: Yes     Comment: SOCIAL DRINKER    • Drug use: Defer   • Sexual  "activity: Defer      Social History     Social History Narrative    LIVES WITH SPOUSE       Family History   Problem Relation Age of Onset   • COPD Mother    • Hyperlipidemia Mother    • Hypertension Mother    • Melanoma Mother    • Heart failure Mother    • Transient ischemic attack Mother    • Colon cancer Father    • No Known Problems Sister    • Heart attack Brother        Review of Systems:      Constitutional: Denies fever, shaking or chills   Eyes: Denies change in visual acuity   HEENT: Denies nasal congestion or sore throat   Respiratory: Denies cough or shortness of breath   Cardiovascular: Denies chest pain or edema  Endocrine: Denies tremors, palpitations, intolerance of heat or cold, polyuria, polydipsia.  GI: Denies abdominal pain, nausea, vomiting, bloody stools or diarrhea  : Denies frequency, urgency, incontinence, retention, or nocturia.  Musculoskeletal: Denies numbness, tingling or loss of motor function except as above  Integument: Denies rash, lesion or ulceration   Neurologic: Denies headache or focal weakness, deficits  Heme: Denies spontaneous or excessive bleeding, epistaxis, hematuria, melena, fatigue, enlarged or tender lymph nodes.      All other pertinent positives and negatives as noted above in HPI.    Physical Exam:   60 y.o. female  Vitals:    01/18/21 0845   Temp: 97.2 °F (36.2 °C)   Weight: 88.5 kg (195 lb)   Height: 154.9 cm (61\")     General:  Patient is awake and alert.  Appears in no acute distress or discomfort.    Psych:  Affect and demeanor are appropriate.    Eyes:  Conjunctiva and sclera appear grossly normal.  Eyes track well and EOM seem to be intact.    Ears:  No gross abnormalities.  Hearing adequate for the exam.    Cardiovascular:  Regular rate and rhythm.    Lungs:  Good chest expansion.  Breathing unlabored.    Extremities: Right upper extremity:  Skin is benign.  No gross abnormalities on inspection including any atrophy, swellings, or masses.  No palpable " masses or adenopathy.  No focal areas of significant tenderness.  Full shoulder motion.  No evident instability or apprehension.  Mildly positive Neer, Lewis maneuvers.  Weakness with elevation in the scapular plane.   Good strength with ER/IR.  Good strength in the deltoid, biceps, triceps, and .  Intact sensation throughout the arm.  Brisk cap refill.  Palpable radial pulse.  Good skin turgor.    Imaging: MRI of the right shoulder is reviewed along with the associated report.  Findings are listed below:    IMPRESSION:  1. Small partial-thickness bursal surface tear of the supraspinatus  tendon 15 mm proximal to its insertion with fraying of bursal surface  fibers. Supraspinatus and infraspinatus tendinopathy. No full-thickness  rotator cuff tear or retraction.  2. Fluid distention subacromial-subdeltoid bursa representing bursitis.  Mild AC joint arthritis. Small acromial spur.    Assessment/Plan:  Right shoulder partial thickness rotator cuff tear, bursitis    We discussed the natural history of this condition and all available treatment options, both surgical and nonsurgical.  The patient acknowledged understanding of this information.  All questions posed by the patient were answered in detail.  At this time, the patient has elected for operative treatment.  We had a thorough discussion regarding the risks, benefits and alternatives to an arthroscopic rotator cuff repair versus debridement.  I explained that surgical risks include infection, hematoma, anchor related complications including failure of fixation, loosening, cutout of the anchors, chondrolysis, rotator cuff re-tear necessitating revision surgery, persistent pain and/or loss of motion, iatrogenic nerve and/or blood vessel injury resulting in permanent weakness, numbness or dysfunction, RSD, DVT, PE, positioning related neuropraxia, and anesthesia related complications resulting in death.  We further discussed the possible need to address the  biceps with a possible tenotomy versus tenodesis.  We discussed the fact that if the biceps demonstrates significant pathology in the groove then I would plan to perform a tenotomy which could result in anna deformity or persistent pain, weakness or cramping.  We also discussed possible risks with a tenodesis as well including screw related complications including cutout, chondrolysis, failure of fixation with re-tear of the biceps, fracture and possible iatrogenic nerve injury.  The patient voiced understanding of the risks, benefits, and alternative forms of treatment that were discussed and the patient consents to proceed.    Juan Pablo Major MD    01/18/2021

## 2021-01-19 PROBLEM — G89.29 CHRONIC RIGHT SHOULDER PAIN: Status: ACTIVE | Noted: 2021-01-19

## 2021-01-19 PROBLEM — M25.511 CHRONIC RIGHT SHOULDER PAIN: Status: ACTIVE | Noted: 2021-01-19

## 2021-01-20 ENCOUNTER — TELEPHONE (OUTPATIENT)
Dept: ORTHOPEDIC SURGERY | Facility: CLINIC | Age: 61
End: 2021-01-20

## 2021-01-20 NOTE — TELEPHONE ENCOUNTER
Caller: PATIENT    Relationship: SELF    Best call back number: [phone]    Timeframe paperwork needed: N/A    Additional notes: PATIENT WAS CALLING REGARDING PAPERWORK THAT SHE NEEDED DR. JORDAN ORTIZ TO FILL OUT PRIOR TO HER UPCOMING SURGERY WITH HIM. PATIENT WASN'T SURE EXACTLY WHAT KIND OF PAPERWORK IT WAS BUT SHE HAD IT WITH HER AND WANTED TO DROP IT OUT OF THE [facility] LOCATION TODAY. I WAS UNABLE TO VERIFY WITH THE FRONT OFFICE IF IT WAS OKAY FOR THE PATIENT TO DROP THAT PAPERWORK OFF THIS AFTERNOON OR NOT DUE TO NO ANSWER. PATIENT STATED SHE WAS GOING TO DROP OFF HER PAPERWORK THIS AFTERNOON AFTER WORK. PLEASE ADVISE.

## 2021-01-25 ENCOUNTER — TRANSCRIBE ORDERS (OUTPATIENT)
Dept: PREADMISSION TESTING | Facility: HOSPITAL | Age: 61
End: 2021-01-25

## 2021-01-25 ENCOUNTER — TELEPHONE (OUTPATIENT)
Dept: ORTHOPEDIC SURGERY | Facility: CLINIC | Age: 61
End: 2021-01-25

## 2021-01-25 DIAGNOSIS — Z01.818 OTHER SPECIFIED PRE-OPERATIVE EXAMINATION: Primary | ICD-10-CM

## 2021-01-27 ENCOUNTER — APPOINTMENT (OUTPATIENT)
Dept: PREADMISSION TESTING | Facility: HOSPITAL | Age: 61
End: 2021-01-27

## 2021-01-27 VITALS
SYSTOLIC BLOOD PRESSURE: 103 MMHG | RESPIRATION RATE: 16 BRPM | OXYGEN SATURATION: 97 % | HEIGHT: 61 IN | HEART RATE: 71 BPM | DIASTOLIC BLOOD PRESSURE: 70 MMHG | WEIGHT: 195.4 LBS | BODY MASS INDEX: 36.89 KG/M2 | TEMPERATURE: 98.1 F

## 2021-01-27 LAB
ANION GAP SERPL CALCULATED.3IONS-SCNC: 11 MMOL/L (ref 5–15)
BUN SERPL-MCNC: 16 MG/DL (ref 8–23)
BUN/CREAT SERPL: 21.3 (ref 7–25)
CALCIUM SPEC-SCNC: 9.6 MG/DL (ref 8.6–10.5)
CHLORIDE SERPL-SCNC: 104 MMOL/L (ref 98–107)
CO2 SERPL-SCNC: 26 MMOL/L (ref 22–29)
CREAT SERPL-MCNC: 0.75 MG/DL (ref 0.57–1)
DEPRECATED RDW RBC AUTO: 36.4 FL (ref 37–54)
ERYTHROCYTE [DISTWIDTH] IN BLOOD BY AUTOMATED COUNT: 12.7 % (ref 12.3–15.4)
GFR SERPL CREATININE-BSD FRML MDRD: 79 ML/MIN/1.73
GLUCOSE SERPL-MCNC: 90 MG/DL (ref 65–99)
HCT VFR BLD AUTO: 39.9 % (ref 34–46.6)
HGB BLD-MCNC: 13 G/DL (ref 12–15.9)
MCH RBC QN AUTO: 26.4 PG (ref 26.6–33)
MCHC RBC AUTO-ENTMCNC: 32.6 G/DL (ref 31.5–35.7)
MCV RBC AUTO: 80.9 FL (ref 79–97)
PLATELET # BLD AUTO: 379 10*3/MM3 (ref 140–450)
PMV BLD AUTO: 9.2 FL (ref 6–12)
POTASSIUM SERPL-SCNC: 4.2 MMOL/L (ref 3.5–5.2)
QT INTERVAL: 435 MS
RBC # BLD AUTO: 4.93 10*6/MM3 (ref 3.77–5.28)
SODIUM SERPL-SCNC: 141 MMOL/L (ref 136–145)
WBC # BLD AUTO: 8.6 10*3/MM3 (ref 3.4–10.8)

## 2021-01-27 PROCEDURE — 93010 ELECTROCARDIOGRAM REPORT: CPT | Performed by: INTERNAL MEDICINE

## 2021-01-27 PROCEDURE — 36415 COLL VENOUS BLD VENIPUNCTURE: CPT

## 2021-01-27 PROCEDURE — 80048 BASIC METABOLIC PNL TOTAL CA: CPT

## 2021-01-27 PROCEDURE — 93005 ELECTROCARDIOGRAM TRACING: CPT

## 2021-01-27 PROCEDURE — 85027 COMPLETE CBC AUTOMATED: CPT

## 2021-01-27 RX ORDER — FEXOFENADINE HCL 180 MG/1
180 TABLET ORAL NIGHTLY
COMMUNITY

## 2021-01-27 RX ORDER — LEVOTHYROXINE SODIUM 112 UG/1
112 TABLET ORAL EVERY MORNING
COMMUNITY

## 2021-02-06 ENCOUNTER — LAB (OUTPATIENT)
Dept: LAB | Facility: HOSPITAL | Age: 61
End: 2021-02-06

## 2021-02-06 DIAGNOSIS — Z01.818 OTHER SPECIFIED PRE-OPERATIVE EXAMINATION: ICD-10-CM

## 2021-02-06 PROCEDURE — U0004 COV-19 TEST NON-CDC HGH THRU: HCPCS

## 2021-02-06 PROCEDURE — C9803 HOPD COVID-19 SPEC COLLECT: HCPCS

## 2021-02-08 LAB — SARS-COV-2 RNA RESP QL NAA+PROBE: NOT DETECTED

## 2021-02-09 ENCOUNTER — HOSPITAL ENCOUNTER (OUTPATIENT)
Facility: HOSPITAL | Age: 61
Setting detail: HOSPITAL OUTPATIENT SURGERY
Discharge: HOME | End: 2021-02-09
Attending: ORTHOPAEDIC SURGERY | Admitting: ORTHOPAEDIC SURGERY

## 2021-02-09 ENCOUNTER — ANESTHESIA EVENT (OUTPATIENT)
Dept: PERIOP | Facility: HOSPITAL | Age: 61
End: 2021-02-09

## 2021-02-09 ENCOUNTER — ANESTHESIA (OUTPATIENT)
Dept: PERIOP | Facility: HOSPITAL | Age: 61
End: 2021-02-09

## 2021-02-09 VITALS
TEMPERATURE: 97.6 F | OXYGEN SATURATION: 94 % | DIASTOLIC BLOOD PRESSURE: 72 MMHG | HEART RATE: 69 BPM | RESPIRATION RATE: 18 BRPM | SYSTOLIC BLOOD PRESSURE: 103 MMHG

## 2021-02-09 DIAGNOSIS — M25.511 CHRONIC RIGHT SHOULDER PAIN: ICD-10-CM

## 2021-02-09 DIAGNOSIS — G89.29 CHRONIC RIGHT SHOULDER PAIN: ICD-10-CM

## 2021-02-09 PROCEDURE — 29827 SHO ARTHRS SRG RT8TR CUF RPR: CPT | Performed by: NURSE PRACTITIONER

## 2021-02-09 PROCEDURE — C9290 INJ, BUPIVACAINE LIPOSOME: HCPCS | Performed by: ANESTHESIOLOGY

## 2021-02-09 PROCEDURE — 76942 ECHO GUIDE FOR BIOPSY: CPT | Performed by: ORTHOPAEDIC SURGERY

## 2021-02-09 PROCEDURE — 25010000002 MIDAZOLAM PER 1 MG: Performed by: ANESTHESIOLOGY

## 2021-02-09 PROCEDURE — C1713 ANCHOR/SCREW BN/BN,TIS/BN: HCPCS | Performed by: ORTHOPAEDIC SURGERY

## 2021-02-09 PROCEDURE — 29826 SHO ARTHRS SRG DECOMPRESSION: CPT | Performed by: NURSE PRACTITIONER

## 2021-02-09 PROCEDURE — 25010000002 PROPOFOL 10 MG/ML EMULSION: Performed by: NURSE ANESTHETIST, CERTIFIED REGISTERED

## 2021-02-09 PROCEDURE — 25010000002 EPINEPHRINE PER 0.1 MG: Performed by: ORTHOPAEDIC SURGERY

## 2021-02-09 PROCEDURE — 25010000003 BUPIVACAINE LIPOSOME 1.3 % SUSPENSION: Performed by: ANESTHESIOLOGY

## 2021-02-09 PROCEDURE — 25010000002 ONDANSETRON PER 1 MG: Performed by: ANESTHESIOLOGY

## 2021-02-09 PROCEDURE — 29827 SHO ARTHRS SRG RT8TR CUF RPR: CPT | Performed by: ORTHOPAEDIC SURGERY

## 2021-02-09 PROCEDURE — 25010000002 FENTANYL CITRATE (PF) 100 MCG/2ML SOLUTION: Performed by: ANESTHESIOLOGY

## 2021-02-09 PROCEDURE — 25010000003 CEFAZOLIN IN DEXTROSE 2-4 GM/100ML-% SOLUTION: Performed by: ORTHOPAEDIC SURGERY

## 2021-02-09 PROCEDURE — 25010000002 NEOSTIGMINE PER 0.5 MG: Performed by: NURSE ANESTHETIST, CERTIFIED REGISTERED

## 2021-02-09 PROCEDURE — 29826 SHO ARTHRS SRG DECOMPRESSION: CPT | Performed by: ORTHOPAEDIC SURGERY

## 2021-02-09 DEVICE — FIBERTAK RC, TRIPLOAD TAPE BL/W, BLK/W W
Type: IMPLANTABLE DEVICE | Site: SHOULDER | Status: FUNCTIONAL
Brand: ARTHREX®

## 2021-02-09 DEVICE — BIO-COMP SWVLK C, CLD 4.75X19.1MM
Type: IMPLANTABLE DEVICE | Site: SHOULDER | Status: FUNCTIONAL
Brand: ARTHREX®

## 2021-02-09 RX ORDER — MIDAZOLAM HYDROCHLORIDE 1 MG/ML
1 INJECTION INTRAMUSCULAR; INTRAVENOUS
Status: DISCONTINUED | OUTPATIENT
Start: 2021-02-09 | End: 2021-02-09 | Stop reason: HOSPADM

## 2021-02-09 RX ORDER — DROPERIDOL 2.5 MG/ML
0.62 INJECTION, SOLUTION INTRAMUSCULAR; INTRAVENOUS ONCE AS NEEDED
Status: DISCONTINUED | OUTPATIENT
Start: 2021-02-09 | End: 2021-02-09 | Stop reason: HOSPADM

## 2021-02-09 RX ORDER — DOCUSATE SODIUM 100 MG/1
100 CAPSULE, LIQUID FILLED ORAL 2 TIMES DAILY
Qty: 60 CAPSULE | Refills: 0 | Status: SHIPPED | OUTPATIENT
Start: 2021-02-09 | End: 2021-03-10

## 2021-02-09 RX ORDER — SODIUM CHLORIDE 0.9 % (FLUSH) 0.9 %
10 SYRINGE (ML) INJECTION AS NEEDED
Status: DISCONTINUED | OUTPATIENT
Start: 2021-02-09 | End: 2021-02-09 | Stop reason: HOSPADM

## 2021-02-09 RX ORDER — PROPOFOL 10 MG/ML
VIAL (ML) INTRAVENOUS AS NEEDED
Status: DISCONTINUED | OUTPATIENT
Start: 2021-02-09 | End: 2021-02-09 | Stop reason: SURG

## 2021-02-09 RX ORDER — HYDROMORPHONE HYDROCHLORIDE 1 MG/ML
0.25 INJECTION, SOLUTION INTRAMUSCULAR; INTRAVENOUS; SUBCUTANEOUS
Status: DISCONTINUED | OUTPATIENT
Start: 2021-02-09 | End: 2021-02-09 | Stop reason: HOSPADM

## 2021-02-09 RX ORDER — NALBUPHINE HCL 10 MG/ML
2 AMPUL (ML) INJECTION EVERY 4 HOURS PRN
Status: DISCONTINUED | OUTPATIENT
Start: 2021-02-09 | End: 2021-02-09 | Stop reason: HOSPADM

## 2021-02-09 RX ORDER — NALBUPHINE HCL 10 MG/ML
10 AMPUL (ML) INJECTION EVERY 4 HOURS PRN
Status: DISCONTINUED | OUTPATIENT
Start: 2021-02-09 | End: 2021-02-09 | Stop reason: HOSPADM

## 2021-02-09 RX ORDER — NALOXONE HCL 0.4 MG/ML
0.4 VIAL (ML) INJECTION AS NEEDED
Status: DISCONTINUED | OUTPATIENT
Start: 2021-02-09 | End: 2021-02-09 | Stop reason: HOSPADM

## 2021-02-09 RX ORDER — SODIUM CHLORIDE, SODIUM LACTATE, POTASSIUM CHLORIDE, CALCIUM CHLORIDE 600; 310; 30; 20 MG/100ML; MG/100ML; MG/100ML; MG/100ML
9 INJECTION, SOLUTION INTRAVENOUS CONTINUOUS
Status: DISCONTINUED | OUTPATIENT
Start: 2021-02-09 | End: 2021-02-09 | Stop reason: HOSPADM

## 2021-02-09 RX ORDER — ACETAMINOPHEN 325 MG/1
650 TABLET ORAL ONCE AS NEEDED
Status: DISCONTINUED | OUTPATIENT
Start: 2021-02-09 | End: 2021-02-09 | Stop reason: HOSPADM

## 2021-02-09 RX ORDER — ACETAMINOPHEN 650 MG
TABLET, EXTENDED RELEASE ORAL AS NEEDED
Status: DISCONTINUED | OUTPATIENT
Start: 2021-02-09 | End: 2021-02-09 | Stop reason: HOSPADM

## 2021-02-09 RX ORDER — HYDROCODONE BITARTRATE AND ACETAMINOPHEN 7.5; 325 MG/1; MG/1
1-2 TABLET ORAL EVERY 4 HOURS PRN
Qty: 42 TABLET | Refills: 0 | Status: SHIPPED | OUTPATIENT
Start: 2021-02-09 | End: 2021-03-10

## 2021-02-09 RX ORDER — ACETAMINOPHEN 500 MG
500 TABLET ORAL ONCE
Status: COMPLETED | OUTPATIENT
Start: 2021-02-09 | End: 2021-02-09

## 2021-02-09 RX ORDER — ROCURONIUM BROMIDE 10 MG/ML
INJECTION, SOLUTION INTRAVENOUS AS NEEDED
Status: DISCONTINUED | OUTPATIENT
Start: 2021-02-09 | End: 2021-02-09 | Stop reason: SURG

## 2021-02-09 RX ORDER — ACETAMINOPHEN 650 MG/1
650 SUPPOSITORY RECTAL ONCE AS NEEDED
Status: DISCONTINUED | OUTPATIENT
Start: 2021-02-09 | End: 2021-02-09 | Stop reason: HOSPADM

## 2021-02-09 RX ORDER — DIPHENHYDRAMINE HYDROCHLORIDE 50 MG/ML
12.5 INJECTION INTRAMUSCULAR; INTRAVENOUS
Status: DISCONTINUED | OUTPATIENT
Start: 2021-02-09 | End: 2021-02-09 | Stop reason: HOSPADM

## 2021-02-09 RX ORDER — ONDANSETRON 2 MG/ML
4 INJECTION INTRAMUSCULAR; INTRAVENOUS ONCE AS NEEDED
Status: COMPLETED | OUTPATIENT
Start: 2021-02-09 | End: 2021-02-09

## 2021-02-09 RX ORDER — FENTANYL CITRATE 50 UG/ML
50 INJECTION, SOLUTION INTRAMUSCULAR; INTRAVENOUS
Status: DISCONTINUED | OUTPATIENT
Start: 2021-02-09 | End: 2021-02-09 | Stop reason: HOSPADM

## 2021-02-09 RX ORDER — LIDOCAINE HYDROCHLORIDE 10 MG/ML
0.5 INJECTION, SOLUTION EPIDURAL; INFILTRATION; INTRACAUDAL; PERINEURAL ONCE AS NEEDED
Status: DISCONTINUED | OUTPATIENT
Start: 2021-02-09 | End: 2021-02-09 | Stop reason: HOSPADM

## 2021-02-09 RX ORDER — SCOLOPAMINE TRANSDERMAL SYSTEM 1 MG/1
1 PATCH, EXTENDED RELEASE TRANSDERMAL CONTINUOUS
Status: DISCONTINUED | OUTPATIENT
Start: 2021-02-09 | End: 2021-02-09 | Stop reason: HOSPADM

## 2021-02-09 RX ORDER — GLYCOPYRROLATE 0.2 MG/ML
INJECTION INTRAMUSCULAR; INTRAVENOUS AS NEEDED
Status: DISCONTINUED | OUTPATIENT
Start: 2021-02-09 | End: 2021-02-09 | Stop reason: SURG

## 2021-02-09 RX ORDER — HYDROCODONE BITARTRATE AND ACETAMINOPHEN 5; 325 MG/1; MG/1
1 TABLET ORAL ONCE AS NEEDED
Status: DISCONTINUED | OUTPATIENT
Start: 2021-02-09 | End: 2021-02-09 | Stop reason: HOSPADM

## 2021-02-09 RX ORDER — ONDANSETRON 4 MG/1
4 TABLET, FILM COATED ORAL EVERY 8 HOURS PRN
Qty: 30 TABLET | Refills: 0 | Status: SHIPPED | OUTPATIENT
Start: 2021-02-09 | End: 2021-03-10

## 2021-02-09 RX ORDER — SODIUM CHLORIDE 0.9 % (FLUSH) 0.9 %
10 SYRINGE (ML) INJECTION EVERY 12 HOURS SCHEDULED
Status: DISCONTINUED | OUTPATIENT
Start: 2021-02-09 | End: 2021-02-09 | Stop reason: HOSPADM

## 2021-02-09 RX ORDER — ISOPROPYL ALCOHOL 70 ML/100ML
LIQUID TOPICAL AS NEEDED
Status: DISCONTINUED | OUTPATIENT
Start: 2021-02-09 | End: 2021-02-09 | Stop reason: HOSPADM

## 2021-02-09 RX ORDER — LIDOCAINE HYDROCHLORIDE 20 MG/ML
INJECTION, SOLUTION INFILTRATION; PERINEURAL AS NEEDED
Status: DISCONTINUED | OUTPATIENT
Start: 2021-02-09 | End: 2021-02-09 | Stop reason: SURG

## 2021-02-09 RX ORDER — BUPIVACAINE HYDROCHLORIDE 5 MG/ML
INJECTION, SOLUTION EPIDURAL; INTRACAUDAL
Status: COMPLETED | OUTPATIENT
Start: 2021-02-09 | End: 2021-02-09

## 2021-02-09 RX ORDER — PROMETHAZINE HYDROCHLORIDE 25 MG/1
25 TABLET ORAL ONCE AS NEEDED
Status: DISCONTINUED | OUTPATIENT
Start: 2021-02-09 | End: 2021-02-09 | Stop reason: HOSPADM

## 2021-02-09 RX ORDER — CEFAZOLIN SODIUM 2 G/100ML
2 INJECTION, SOLUTION INTRAVENOUS ONCE
Status: COMPLETED | OUTPATIENT
Start: 2021-02-09 | End: 2021-02-09

## 2021-02-09 RX ORDER — MIDAZOLAM HYDROCHLORIDE 1 MG/ML
2 INJECTION INTRAMUSCULAR; INTRAVENOUS
Status: DISCONTINUED | OUTPATIENT
Start: 2021-02-09 | End: 2021-02-09 | Stop reason: HOSPADM

## 2021-02-09 RX ADMIN — BUPIVACAINE HYDROCHLORIDE 10 ML: 5 INJECTION, SOLUTION EPIDURAL; INTRACAUDAL; PERINEURAL at 09:23

## 2021-02-09 RX ADMIN — Medication 3 MG: at 11:20

## 2021-02-09 RX ADMIN — ROCURONIUM BROMIDE 40 MG: 10 INJECTION, SOLUTION INTRAVENOUS at 10:25

## 2021-02-09 RX ADMIN — FENTANYL CITRATE 50 MCG: 50 INJECTION, SOLUTION INTRAMUSCULAR; INTRAVENOUS at 09:12

## 2021-02-09 RX ADMIN — BUPIVACAINE 10 ML: 13.3 INJECTION, SUSPENSION, LIPOSOMAL INFILTRATION at 09:23

## 2021-02-09 RX ADMIN — ONDANSETRON HYDROCHLORIDE 4 MG: 2 SOLUTION INTRAMUSCULAR; INTRAVENOUS at 10:54

## 2021-02-09 RX ADMIN — LIDOCAINE HYDROCHLORIDE 60 MG: 20 INJECTION, SOLUTION INFILTRATION; PERINEURAL at 10:25

## 2021-02-09 RX ADMIN — CEFAZOLIN SODIUM 2 G: 2 INJECTION, SOLUTION INTRAVENOUS at 10:33

## 2021-02-09 RX ADMIN — ACETAMINOPHEN 500 MG: 500 TABLET ORAL at 09:11

## 2021-02-09 RX ADMIN — SODIUM CHLORIDE, POTASSIUM CHLORIDE, SODIUM LACTATE AND CALCIUM CHLORIDE 9 ML/HR: 600; 310; 30; 20 INJECTION, SOLUTION INTRAVENOUS at 08:09

## 2021-02-09 RX ADMIN — PROPOFOL 150 MG: 10 INJECTION, EMULSION INTRAVENOUS at 10:25

## 2021-02-09 RX ADMIN — SCOPALAMINE 1 PATCH: 1 PATCH, EXTENDED RELEASE TRANSDERMAL at 09:11

## 2021-02-09 RX ADMIN — MIDAZOLAM 1 MG: 1 INJECTION INTRAMUSCULAR; INTRAVENOUS at 09:12

## 2021-02-09 RX ADMIN — GLYCOPYRROLATE 0.4 MG: 0.2 INJECTION INTRAMUSCULAR; INTRAVENOUS at 11:20

## 2021-02-09 NOTE — OP NOTE
Orthopaedic Operative Note    Facility: [facility]    Patient: Lynda Rueda    Medical Record Number: [id number]    YOB: 1960    Dictating Surgeon: Juan Pablo Major M.D.*    Primary Care Physician: Renzo Avila MD    Date of Operation: 2/9/2021    Pre-Operative Diagnosis:  Right shoulder high-grade partial versus small full-thickness rotator cuff tendon tear    Post-Operative Diagnosis:  Right shoulder full-thickness posterior rotator cuff tendon tear    Procedure Performed:     Arthroscopic rotator cuff repair with subacromial decompression      Surgeon: Juan Pablo Major MD     Assistant: Radha Allen APRN was responsible for performing the following activities: Retraction, cannula and suture management, wound closure and application of the postoperative bandages and their skilled assistance was necessary for the success of this case.    Anesthesia: Regional followed general.     Complications: None.     Estimated Blood Loss: Less than 50 mL.     Implants: 1 Arthrex triple loaded fiber tack anchor for medial row rotator cuff repair.  To 4.75 mm swivel lock anchors for lateral row repair    Specimens: * No orders in the log *    Brief Operative Indication:  The patient had a history of a torn right rotator cuff which had been persistently symptomatic.  We talked about surgical and non-surgical treatment options.  The patient was felt to be a candidate for repair versus debridement.   I explained that surgical risks include infection, hematoma, hardware related complications including failure of fixation, cutout, arthrofibrosis, re-tear, persistent pain and/or loss of motion, iatrogenic nerve and/or blood vessel injury resulting in permanent weakness, numbness or dysfunction, DVT, PE, positioning related neuropraxia, and anesthesia related complications resulting in death.     Description of Procedure in Detail:  The patient and operative site were identified  in the preoperative holding area.  The surgical site was marked with the patient's confirmation.  Adequate regional anesthesia of the right upper extremity was administered by the anesthesiologist.  The patient was then taken to the operating room and placed in the supine position.  Adequate general anesthesia was then administered.  The patient was repositioned into the lateral decubitus position.  All bony prominences were carefully padded and protected.  The right upper extremity was prepped and draped in the standard sterile fashion.  I cleaned the extremity with an alcohol solution.  A Hibiclens scrub was performed.  Lastly, the extremity was prepped with 2 ChloraPrep preps.  I allowed those to dry for approximately 3 minutes before the draping procedure was carried out.  A timeout was taken and preoperative antibiotics administered prior to surgical incision.      The arm was placed into 10 pounds of lateral traction.  A standard posterior portal was established.  The scope was inserted into the joint and directed anteriorly to the rotator interval where a standard anterior portal was established.  A 7 mm cannula was inserted into the joint and then a diagnostic arthroscopy performed.  There was no significant pathology noted in her joint.  The cuff looked fine from the articular side.  Her biceps was stable and in good condition when probed.  Her articular cartilage was well preserved.  The labrum was stable.  Multiple images were taken and I then directed my attention to the subacromial space.    A lateral portal was established approximately 2 cm off the lateral edge of the acromion.  A shaver was inserted and then an extensive bursectomy performed.  There was a modest subacromial spur and fraying of the coracoacromial ligament consistent with impingement.  An Arthrex Apollo device was used to release the coracoacromial ligament.  An acromioplasty was performed in the typical fashion using a barrel tip  rohit.      Her rotator cuff was then examined from the bursal side.  She had a very interesting tear.  This was a far posterior tear involving what I would estimate to be actually the infraspinatus rather than the posterior supraspinatus.  Her MRI had suggested this was possibly relatively low-grade but this was confirmed to be a high-grade tear when probing this.  The depth of the tearing was over a centimeter.  There was good tendon more anteriorly but as the tear extended back, it actually extended out towards the musculotendinous junction, within just a few millimeters of the muscle belly.  Although this was repairable, again, this was a very unusual tear pattern.  I determined that a repair was indicated.  The rotator cuff insertion site was debrided back to bleeding healthy-appearing bone to create a healing response.  An accessory portal was established just off the lateral edge of the acromion and a single Arthrex triple loaded fiber tack anchor was placed along the articular cartilage margin for the medial row repair.  The anchor seated well and got good purchase in the bone.    Using standard suture shuttling technique and a self retrieving scorpion device, the sutures were sequentially shuttled through the rotator cuff tissue from anterior to posterior in a mattress fashion.  Once I had completed suture passage, the sutures were sequentially tied from posterior to anterior using 6 throw surgeon's knots with reverse half hitches on alternating posts.  I got an excellent repair along the medial row.  There was sufficient good quality tissue to allow for placement of lateral row anchors.  Sutures from the anchors were brought out through the lateral portal and passed through a 4.75 mm swivel lock anchor.  This anchor was punched and placed along the posterior aspect of the tuberosity.  The anchor seated well and got excellent purchase.  The excess sutures were cut and removed.  The final sutures were brought  out through the lateral portal and passed through a second swivel lock anchor.  This anchor was punched and placed a little more anteriorly on the tuberosity to complete the double row construct.  Again, this anchor seated well and got excellent purchase.  The excess sutures were cut and removed.  This created what seemed to be an anatomic repair of the rotator cuff footprint.  There were no loose ends or dog-ears.  The tissue seemed to be well fixed without excessive tension.  Final images were taken and saved.      The wounds were copiously irrigated out with sterile saline and closed in a layered fashion using Monocryl for the deep tissues and nylon for the skin.  Sterile dressings were applied.  The drapes were withdrawn.  The arm was placed in an immobilizer.  The patient was awakened and taken to the recovery room in good condition.    Juan Pablo Major MD  02/09/21

## 2021-02-09 NOTE — ANESTHESIA PROCEDURE NOTES
Peripheral Block    Pre-sedation assessment completed: 2/9/2021 9:16 AM    Patient reassessed immediately prior to procedure    Patient location during procedure: pre-op  Start time: 2/9/2021 9:16 AM  Stop time: 2/9/2021 9:23 AM  Reason for block: at surgeon's request and post-op pain management  Performed by  Anesthesiologist: Lavelle Medina MD  Preanesthetic Checklist  Completed: patient identified, site marked, surgical consent, pre-op evaluation, timeout performed, IV checked, risks and benefits discussed and monitors and equipment checked  Prep:  Sterile barriers:gloves  Prep: ChloraPrep  Patient monitoring: blood pressure monitoring, continuous pulse oximetry and EKG  Procedure  Sedation:yes    Guidance:ultrasound guided  ULTRASOUND INTERPRETATION.  Using ultrasound guidance a 22 G gauge needle was placed in close proximity to the brachial plexus nerve, at which point, under ultrasound guidance anesthetic was injected in the area of the nerve and spread of the anesthesia was seen on ultrasound in close proximity thereto.  There were no abnormalities seen on ultrasound; a digital image was taken; and the patient tolerated the procedure with no complications. Images:still images obtained    Block Type:interscalene  Injection Technique:single-shot  Needle Type:short-bevel  Needle Gauge:22 G      Medications Used: bupivacaine liposome (EXPAREL) 1.3 % injection, 10 mL  bupivacaine (PF) (MARCAINE) 0.5 % injection, 10 mL      Medications  Comment:.    Post Assessment  Injection Assessment: negative aspiration for heme, no paresthesia on injection and incremental injection  Patient Tolerance:comfortable throughout block  Complications:no

## 2021-02-09 NOTE — ANESTHESIA PREPROCEDURE EVALUATION
Anesthesia Evaluation     history of anesthetic complications: PONV  NPO Solid Status: > 8 hours             Airway   Mallampati: II  TM distance: >3 FB  Neck ROM: full  No difficulty expected  Dental - normal exam     Pulmonary - negative pulmonary ROS and normal exam   (-) not a smoker  Cardiovascular     ECG reviewed  Rhythm: regular    (+) hypertension, valvular problems/murmurs, hyperlipidemia,   (-) murmur, peripheral edema    ROS comment: Trace AR/TR/MR    Neuro/Psych  GI/Hepatic/Renal/Endo    (+) morbid obesity, GERD,  diabetes mellitus type 2,     Musculoskeletal     Abdominal    Substance History      OB/GYN          Other                        Anesthesia Plan    ASA 3     general with block   (  D/W R&B of GA including but not limited to: heart, lung, liver, kidney, neurologic problems, positioning injuries, dental damage, corneal abrasion and TMJ.  .Risks of peripheral nerve block were discussed with patient including but not limited to: inadequate block, bleeding, infection, persistent numbness or weakness, nerve damage, painful dysasthesia and need to protect limb while numb.)

## 2021-02-09 NOTE — BRIEF OP NOTE
SHOULDER ARTHROSCOPY WITH ROTATOR CUFF REPAIR  Progress Note    Lynda Rueda  2/9/2021    Pre-op Diagnosis:   Chronic right shoulder pain [M25.511, G89.29]       Post-Op Diagnosis Codes:     * Chronic right shoulder pain [M25.511, G89.29]    Procedure/CPT® Codes:        Procedure(s):  SHOULDER ARTHROSCOPY WITH ROTATOR CUFF REPAIR, subacromial decompression    Surgeon(s):  Juan Pablo Major MD    Anesthesia: General with Block    Staff:   Circulator: Brooks Kim RN  Scrub Person: Rebecca Barreto  Vendor Representative: Asad Ng  Assistant: Radha Allen APRN  Assistant: Radha Allen APRN      Estimated Blood Loss: minimal    Urine Voided: * No values recorded between 2/9/2021 10:16 AM and 2/9/2021 11:29 AM *    Specimens:                None          Drains: * No LDAs found *    Findings: See dictation    Complications: None    Assistant: Radha Allen APRN  was responsible for performing the following activities: Retraction, cannula and suture management, wound closure and application of the postoperative bandages and their skilled assistance was necessary for the success of this case.    Juan Pablo Major MD     Date: 2/9/2021  Time: 11:32 EST

## 2021-02-09 NOTE — ANESTHESIA POSTPROCEDURE EVALUATION
Patient: Lynda Rueda    Procedure Summary     Date: 02/09/21 Room / Location:  [facility]  /  [facility]    Anesthesia Start: 1018 Anesthesia Stop: 1144    Procedure: SHOULDER ARTHROSCOPY WITH ROTATOR CUFF REPAIR, subacromial decompression (Right Shoulder) Diagnosis:       Chronic right shoulder pain      (Chronic right shoulder pain [M25.511, G89.29])    Surgeon: Juan Pablo Major MD Provider: Gallito Mancilla MD    Anesthesia Type: general with block ASA Status: 3          Anesthesia Type: general with block    Vitals  Vitals Value Taken Time   /67 02/09/21 1215   Temp 36.4 °C (97.6 °F) 02/09/21 1215   Pulse 73 02/09/21 1218   Resp 18 02/09/21 1215   SpO2 93 % 02/09/21 1218   Vitals shown include unvalidated device data.        Post Anesthesia Care and Evaluation    Patient location during evaluation: bedside  Patient participation: complete - patient participated  Level of consciousness: awake and alert  Pain management: adequate  Airway patency: patent  Anesthetic complications: No anesthetic complications  PONV Status: controlled  Cardiovascular status: blood pressure returned to baseline and acceptable  Respiratory status: acceptable  Hydration status: acceptable

## 2021-02-09 NOTE — ANESTHESIA PROCEDURE NOTES
Airway  Urgency: elective    Date/Time: 2/9/2021 10:28 AM  Airway not difficult    General Information and Staff    Patient location during procedure: OR  CRNA: Lyn Thomas CRNA    Indications and Patient Condition  Indications for airway management: airway protection    Preoxygenated: yes  Mask difficulty assessment: 1 - vent by mask    Final Airway Details  Final airway type: endotracheal airway      Successful airway: ETT  Cuffed: yes   Successful intubation technique: direct laryngoscopy  Endotracheal tube insertion site: oral  Blade: Justice  Blade size: 3  ETT size (mm): 7.0  Cormack-Lehane Classification: grade I - full view of glottis  Placement verified by: chest auscultation and capnometry   Cuff volume (mL): 6  Measured from: lips  ETT/EBT  to lips (cm): 20  Number of attempts at approach: 1  Assessment: lips, teeth, and gum same as pre-op and atraumatic intubation    Additional Comments  Smooth IV induction. Trachea intubated. Cuff up. Ett secured. BEBS. Dentition intact without injury.                 Simple: Patient demonstrates quick and easy understanding

## 2021-02-10 ENCOUNTER — TELEPHONE (OUTPATIENT)
Dept: ORTHOPEDIC SURGERY | Facility: CLINIC | Age: 61
End: 2021-02-10

## 2021-02-10 NOTE — TELEPHONE ENCOUNTER
Post-op follow-up call.  I spoke to Ms. Helmsadithya.  Reports her block is still working.  I encouraged her to postpone pendulum exercises until she has full sensation return to her arm.  We discussed other postop care instructions.  I encouraged her to call us with any questions or concerns prior to her follow-up appointment next week.  She acknowledged understanding and appreciated the call.

## 2021-02-17 ENCOUNTER — OFFICE VISIT (OUTPATIENT)
Dept: ORTHOPEDIC SURGERY | Facility: CLINIC | Age: 61
End: 2021-02-17

## 2021-02-17 VITALS — BODY MASS INDEX: 36.25 KG/M2 | WEIGHT: 192 LBS | TEMPERATURE: 97.2 F | HEIGHT: 61 IN

## 2021-02-17 DIAGNOSIS — Z98.890 S/P RIGHT ROTATOR CUFF REPAIR: Primary | ICD-10-CM

## 2021-02-17 PROCEDURE — 99024 POSTOP FOLLOW-UP VISIT: CPT | Performed by: ORTHOPAEDIC SURGERY

## 2021-02-17 RX ORDER — TRAMADOL HYDROCHLORIDE 50 MG/1
50 TABLET ORAL EVERY 4 HOURS PRN
Qty: 60 TABLET | Refills: 0 | Status: SHIPPED | OUTPATIENT
Start: 2021-02-17 | End: 2021-03-10

## 2021-02-17 NOTE — PROGRESS NOTES
Lynda Rueda : 1960 MRN: [id number] DATE: 2021    CC: 1 week s/p right shoulder rotator cuff repair    HPI: Patient returns to clinic today for follow up.  Reports pain is well controlled.  Denies fevers, drainage, redness or other concerning symptoms.  Reports compliance with use of the sling.    Vitals:    21 0947   Temp: 97.2 °F (36.2 °C)     Exam:  Wounds appear well-approximated.  Arm and forearm soft.  Shoulder moves fluidly with pendulums.  Good motor and sensory function in the hand and wrist.  Palpable radial pulse with brisk capillary refill    Impression:  1 week s/p right shoulder rotator cuff repair    Plan:    1.  Begin PT per protocol--prescription given as well as 2 copies of my protocol.  2.  Continue shoulder immobilizer.  Instructions about the importance of compliance with this were carefully discussed.  3.  Follow up in 5 weeks   4.  Counseled the patient about appropriate activity modifications and restrictions, including no driving at this point.    Juan Pablo Major MD

## 2021-02-24 ENCOUNTER — TELEPHONE (OUTPATIENT)
Dept: ORTHOPEDIC SURGERY | Facility: CLINIC | Age: 61
End: 2021-02-24

## 2021-02-25 ENCOUNTER — TREATMENT (OUTPATIENT)
Dept: PHYSICAL THERAPY | Facility: CLINIC | Age: 61
End: 2021-02-25

## 2021-02-25 DIAGNOSIS — M25.511 ACUTE PAIN OF RIGHT SHOULDER: Primary | ICD-10-CM

## 2021-02-25 DIAGNOSIS — Z47.89 ORTHOPEDIC AFTERCARE: ICD-10-CM

## 2021-02-25 PROCEDURE — 97164 PT RE-EVAL EST PLAN CARE: CPT | Performed by: PHYSICAL THERAPIST

## 2021-02-25 PROCEDURE — 97110 THERAPEUTIC EXERCISES: CPT | Performed by: PHYSICAL THERAPIST

## 2021-02-26 NOTE — PROGRESS NOTES
Re-Assessment / Re-Certification        Patient: Lynda Rueda   : 1960  Diagnosis/ICD-10 Code:  Acute pain of right shoulder [M25.511]  Referring practitioner: Juan Pablo Major MD  Date of Initial Visit: Type: THERAPY  Noted: 2020  Today's Date: 2021  Patient seen for 16 sessions      Subjective:     Clinical Progress: worse  Home Program Compliance: Yes  Treatment has included:  therapeutic exercise    Subjective Evaluation    History of Present Illness  Date of onset: 2021  Date of surgery: 2021  Mechanism of injury: FOSH    Subjective comment: (R) shoulder pain post rc repair with decompressionQuality of life: good    Pain  Current pain rating: 3  At best pain ratin  At worst pain ratin  Quality: dull ache, throbbing, tight, radiating and pulling  Aggravating factors: overhead activity, outstretched reach, repetitive movement, movement and lifting    Patient Goals  Patient goals for therapy: decreased pain, increased motion, increased strength, independence with ADLs/IADLs and return to sport/leisure activities         Objective          Postural Observations  Seated posture: fair  Standing posture: fair        Observations     Additional Shoulder Observation Details  Compliant with (R) sling; reviewed precautions/ restrictions; POC; portal sites healing without drainage    Tenderness     Right Shoulder  Tenderness in the AC joint, acromion, biceps tendon (proximal), infraspinatus tendon, subacromial bursa, subscapularis tendon and supraspinatus tendon.     Cervical/Thoracic Screen   Cervical range of motion within normal limits  Thoracic range of motion within normal limits    Neurological Testing     Sensation     Shoulder   Left Shoulder   Intact: light touch    Right Shoulder   Intact: light touch    Passive Range of Motion     Right Shoulder   Flexion: 80 degrees   Abduction: 50 degrees      General Comments     Shoulder Comments   Deferred MMT secondary to 2 wk s/p  (R) shoulder sx.     See Exercise, Manual, and Modality Logs for complete treatment.   Functional outcome score: pt shoulder is mobilized      Assessment & Plan     Assessment  Impairments: abnormal gait, abnormal muscle firing, abnormal or restricted ROM, activity intolerance, impaired balance, impaired physical strength, pain with function and safety issue  Assessment details: Lynda Rueda is a 60 y.o. year-old female referred to physical therapy for s/p (R) RC repair with decompression. She presents with a stable clinical presentation.  She has no comorbidities  and no personal factors that may affect her progress in the plan of care. Pt is compliant with precautions/ restrictions;  Pt demonstrates decreased (R) shoulder PROM/ AAROM/ AROM/ MMT throughout consistent with dx.  Pt reports 3/ 10 (R) shoulder pain at rest.  Pt notes difficulty with dressing/ grooming/ bathing/ reaching tasks.  Signs and symptoms are consistent with physical therapy diagnosis of (R) shoulder pain/ orthopedic aftercare.  Prognosis: good  Functional Limitations: carrying objects, lifting, walking, pulling, uncomfortable because of pain, standing, stooping, reaching behind back, reaching overhead and unable to perform repetitive tasks  Goals  Plan Goals: stg 6 wks    Pt to be educated/ independent with precautions-restrictions    Increased (R) shoulder PROM 90 degrees in flexion/ abduction to allow for increased ease with dressing/ grooming activities.    Pt to demonstrate consistently correct sitting posture    Pt to be independent with donning / doffing (R) shoulder sling.     ltg 12 wks.    Increase (R) shoulder PROM 170 degrees in flexion /abduction to allow for increased ease with dressing/ grooming/ bathing activities.    Increased (R) shoulder AAROM 90 degrees in flexion/ abduction to allow for increased ease with dressing/ feeding/ grooming activities.         Plan  Therapy options: will be seen for skilled physical therapy  services  Planned modality interventions: cryotherapy, electrical stimulation/Russian stimulation, TENS, ultrasound and thermotherapy (hydrocollator packs)  Planned therapy interventions: abdominal trunk stabilization, manual therapy, motor coordination training, neuromuscular re-education, balance/weight-bearing training, ADL retraining, flexibility, functional ROM exercises, gait training, home exercise program, joint mobilization, transfer training, therapeutic activities, strengthening, stretching and soft tissue mobilization  Frequency: 2x week  Duration in weeks: 12  Treatment plan discussed with: patient           Recommendations: Continue as planned  Timeframe: 1 month  Prognosis to achieve goals: good    PT Signature: Joe Carey, PT      Based upon review of the patient's progress and continued therapy plan, it is my medical opinion that Lynda Rueda should continue physical therapy treatment at [facility]  [address] [place] [address] [address] [place] [address]  [phone].    Signature: __________________________________  Juan Pablo Major MD    Timed:  Manual Therapy:         mins  17449;  Therapeutic Exercise:     25    mins  08585;     Neuromuscular Erica:       mins  97189;    Therapeutic Activity:          mins  19724;     Gait Training:           mins  70090;     Ultrasound:          mins  09770;    Electrical Stimulation:         mins  14177 ( );  Iontophoresis         mins 47497;  Dry Needling                   mins    Untimed:  Electrical Stimulation:         mins  43718 ( );  Mechanical Traction:         mins  51956;     Timed Treatment: 25     mins   Total Treatment:     45   mins

## 2021-03-02 ENCOUNTER — TREATMENT (OUTPATIENT)
Dept: PHYSICAL THERAPY | Facility: CLINIC | Age: 61
End: 2021-03-02

## 2021-03-02 DIAGNOSIS — M25.511 ACUTE PAIN OF RIGHT SHOULDER: Primary | ICD-10-CM

## 2021-03-02 DIAGNOSIS — Z47.89 ORTHOPEDIC AFTERCARE: ICD-10-CM

## 2021-03-02 PROCEDURE — 97110 THERAPEUTIC EXERCISES: CPT | Performed by: PHYSICAL THERAPIST

## 2021-03-02 NOTE — PROGRESS NOTES
Physical Therapy Daily Progress Note    Patient: Lynda Rueda   : 1960  Diagnosis/ICD-10 Code:  Acute pain of right shoulder [M25.511]  Referring practitioner: Juan Pablo Major MD  Date of Initial Visit: Type: THERAPY  Noted: 2020  Today's Date: 3/2/2021  Patient seen for 17 sessions           Subjective compliant with precautions / restrictions.    Objective   See Exercise, Manual, and Modality Logs for complete treatment.       Assessment/Plan  Progressed with PROM (R) shoulder in flexion 105/ abduction 95.  Improved PROM       Timed:    Manual Therapy:         mins  29071;  Therapeutic Exercise:    25   mins  30629;     Neuromuscular Erica:        mins  53033;    Therapeutic Activity:          mins  64591;     Gait Training:         mins  57361;     Ultrasound:          mins  22946;    Electrical Stimulation:         mins  57061 ( );  Iontophoresis         mins 65061;  Aquatic Therapy         mins 14414;  Dry Needling                   mins    Untimed:  Electrical Stimulation:         mins  69033 ( );  Mechanical Traction:         mins  74397;     Timed Treatment:   25   mins   Total Treatment:   25     mins  Joe Carey, PT  Physical Therapist

## 2021-03-04 ENCOUNTER — TREATMENT (OUTPATIENT)
Dept: PHYSICAL THERAPY | Facility: CLINIC | Age: 61
End: 2021-03-04

## 2021-03-04 DIAGNOSIS — M25.511 ACUTE PAIN OF RIGHT SHOULDER: Primary | ICD-10-CM

## 2021-03-04 DIAGNOSIS — Z47.89 ORTHOPEDIC AFTERCARE: ICD-10-CM

## 2021-03-04 PROCEDURE — 97110 THERAPEUTIC EXERCISES: CPT | Performed by: PHYSICAL THERAPIST

## 2021-03-04 NOTE — PROGRESS NOTES
Physical Therapy Daily Progress Note    Patient: Lynda Rueda   : 1960  Diagnosis/ICD-10 Code:  Acute pain of right shoulder [M25.511]  Referring practitioner: Juan Pablo Major MD  Date of Initial Visit: Type: THERAPY  Noted: 2020  Today's Date: 3/4/2021  Patient seen for 18 sessions           Subjective compliant with precautions restrictions. Shoulder not hurting as much at rest 2/ 10    Objective   See Exercise, Manual, and Modality Logs for complete treatment.       Assessment/Plan    Implemented PROM 20 mins in flexion 145 / abduction 120;  PROM improving.  No increase in symptoms of pain. Independent with donning/ doffing sling independently.        Timed:    Manual Therapy:       mins  96536;  Therapeutic Exercise:      20   mins  95325;     Neuromuscular Erica:        mins  92311;    Therapeutic Activity:          mins  85407;     Gait Training:           mins  04864;     Ultrasound:          mins  20090;    Electrical Stimulation:         mins  44154 ( );  Iontophoresis         mins 43005;  Aquatic Therapy         mins 27571;  Dry Needling                   mins    Untimed:  Electrical Stimulation:         mins  84449 ( );  Mechanical Traction:         mins  14913;     Timed Treatment:  20    mins   Total Treatment:     20   mins  Joe Carey PT  Physical Therapist

## 2021-03-08 ENCOUNTER — TREATMENT (OUTPATIENT)
Dept: PHYSICAL THERAPY | Facility: CLINIC | Age: 61
End: 2021-03-08

## 2021-03-08 DIAGNOSIS — Z47.89 ORTHOPEDIC AFTERCARE: ICD-10-CM

## 2021-03-08 DIAGNOSIS — M25.511 ACUTE PAIN OF RIGHT SHOULDER: Primary | ICD-10-CM

## 2021-03-08 PROCEDURE — 97110 THERAPEUTIC EXERCISES: CPT | Performed by: PHYSICAL THERAPIST

## 2021-03-08 NOTE — PROGRESS NOTES
Physical Therapy Daily Progress Note    Patient: Lynda Rueda   : 1960  Diagnosis/ICD-10 Code:  Acute pain of right shoulder [M25.511]  Referring practitioner: Juan Pablo Major MD  Date of Initial Visit: Type: THERAPY  Noted: 2020  Today's Date: 3/8/2021  Patient seen for 19 sessions           Subjective   Sleeping 6 hrs. No pain at rest. Compliant with precautions/ restricxtions  Objective   See Exercise, Manual, and Modality Logs for complete treatment.       Assessment/Plan  Progressed with PROM in flexion 165/ abduction 130;  Follows up with ortho weds 4 wks s/p rc repair/ decompression.         Timed:    Manual Therapy:         mins  98243;  Therapeutic Exercise:       25  mins  32128;     Neuromuscular Erica:        mins  94475;    Therapeutic Activity:          mins  67645;     Gait Training:           mins  24826;     Ultrasound:          mins  08822;    Electrical Stimulation:         mins  23231 ( );  Iontophoresis         mins 28230;  Aquatic Therapy         mins 67291;  Dry Needling                   mins    Untimed:  Electrical Stimulation:         mins  88746 ( );  Mechanical Traction:         mins  85762;     Timed Treatment:   25   mins   Total Treatment:     25   mins  Joe Carey, PT  Physical Therapist

## 2021-03-10 ENCOUNTER — OFFICE VISIT (OUTPATIENT)
Dept: ORTHOPEDIC SURGERY | Facility: CLINIC | Age: 61
End: 2021-03-10

## 2021-03-10 VITALS — BODY MASS INDEX: 36.25 KG/M2 | WEIGHT: 192 LBS | TEMPERATURE: 97.3 F | HEIGHT: 61 IN

## 2021-03-10 DIAGNOSIS — Z09 SURGERY FOLLOW-UP: Primary | ICD-10-CM

## 2021-03-10 PROCEDURE — 99024 POSTOP FOLLOW-UP VISIT: CPT | Performed by: NURSE PRACTITIONER

## 2021-03-10 RX ORDER — FLASH GLUCOSE SENSOR
KIT MISCELLANEOUS
COMMUNITY
Start: 2021-02-27

## 2021-03-10 NOTE — PROGRESS NOTES
Lynda Rueda : 1960 MRN: [id number] DATE: 3/10/2021    CC: 4 weeks s/p right shoulder rotator cuff repair    HPI: Pt. returns to clinic today for follow up.  Reports pain is well controlled with occasional Tylenol.  Reports compliance with use of the sling and physical therapy.  Denies any new issues or problems.    Vitals:    03/10/21 0910   Temp: 97.3 °F (36.3 °C)       Exam:  Wounds appear well-healed.  Arm and forearm soft.  Shoulder moves fluidly and motion is on track per protocol.  Good motor and sensory function distally.  Palpable radial pulse with good cap refill.      Impression:  4 weeks s/p right shoulder rotator cuff repair    Plan:    1.  Continue PT per protocol.  2.  Continue shoulder immobilizer for 2 more week.     3.  Begin working on progressive ROM per protocol.  4.  Counseled the patient about appropriate activity modifications and restrictions.  May drive in 2 weeks.  5.  May return to work on 3/22/21 with restrictions.  No pushing, no pulling, no lifting greater than 2 pounds with right arm.  6.  Follow up in 6 weeks with Dr. Pedrito Allen, APRN     03/10/2021

## 2021-03-11 ENCOUNTER — TREATMENT (OUTPATIENT)
Dept: PHYSICAL THERAPY | Facility: CLINIC | Age: 61
End: 2021-03-11

## 2021-03-11 DIAGNOSIS — M25.511 ACUTE PAIN OF RIGHT SHOULDER: Primary | ICD-10-CM

## 2021-03-11 DIAGNOSIS — Z47.89 ORTHOPEDIC AFTERCARE: ICD-10-CM

## 2021-03-11 PROCEDURE — 97110 THERAPEUTIC EXERCISES: CPT | Performed by: PHYSICAL THERAPIST

## 2021-03-11 PROCEDURE — 97530 THERAPEUTIC ACTIVITIES: CPT | Performed by: PHYSICAL THERAPIST

## 2021-03-11 NOTE — PROGRESS NOTES
Physical Therapy Daily Progress Note    Patient: Lynda Rueda   : 1960  Diagnosis/ICD-10 Code:  Acute pain of right shoulder [M25.511]  Referring practitioner: Juan Pablo Major MD  Date of Initial Visit: Type: THERAPY  Noted: 2020  Today's Date: 3/11/2021  Patient seen for 20 sessions           Subjective   Ortho consult went well.  2 wks then dc sling. Happy with motion  Objective   See Exercise, Manual, and Modality Logs for complete treatment.       Assessment/Plan  (R) shoulder PROM/ AAROM in flexion 165/ abduction 150. Compliant with precautions/restriction.  Improving PROM/ AAROM appropriately.         Timed:    Manual Therapy:       mins  68223;  Therapeutic Exercise:  30      mins  52765;     Neuromuscular Erica:        mins  88016;    Therapeutic Activity:          mins  45375;     Gait Training:           mins  68271;     Ultrasound:          mins  43875;    Electrical Stimulation:         mins  25977 ( );  Iontophoresis         mins 88735;  Aquatic Therapy         mins 07881;  Dry Needling                   mins    Untimed:  Electrical Stimulation:         mins  94205 ( );  Mechanical Traction:         mins  18230;     Timed Treatment:  30    mins   Total Treatment:     30  mins  Joe Carey, PT  Physical Therapist

## 2021-03-15 ENCOUNTER — TREATMENT (OUTPATIENT)
Dept: PHYSICAL THERAPY | Facility: CLINIC | Age: 61
End: 2021-03-15

## 2021-03-15 DIAGNOSIS — M25.511 ACUTE PAIN OF RIGHT SHOULDER: Primary | ICD-10-CM

## 2021-03-15 DIAGNOSIS — Z47.89 ORTHOPEDIC AFTERCARE: ICD-10-CM

## 2021-03-15 PROCEDURE — 97110 THERAPEUTIC EXERCISES: CPT | Performed by: PHYSICAL THERAPIST

## 2021-03-15 NOTE — PROGRESS NOTES
Physical Therapy Daily Progress Note    Patient: Lynda Rueda   : 1960  Diagnosis/ICD-10 Code:  Acute pain of right shoulder [M25.511]  Referring practitioner: Juan Pablo Major MD  Date of Initial Visit: Type: THERAPY  Noted: 2020  Today's Date: 3/15/2021  Patient seen for 21 sessions           Subjective shoulder feels a little stiff today. Minimal pain this visit    Objective   See Exercise, Manual, and Modality Logs for complete treatment.       Assessment/Plan  Progressed with PROM/ AAROM (R) shoulder per tolerance.  Improving (R) PROM in flexion 165 flexion/ 150 abduction. Pt is 5 wks s/p         Timed:    Manual Therapy:      mins  46759;  Therapeutic Exercise:    35     mins  80069;     Neuromuscular Erica:        mins  45214;    Therapeutic Activity:          mins  71616;     Gait Training:           mins  38085;     Ultrasound:          mins  13041;    Electrical Stimulation:         mins  65943 ( );  Iontophoresis         mins 46457;  Aquatic Therapy         mins 91339;  Dry Needling                   mins    Untimed:  Electrical Stimulation:         mins  23672 ( );  Mechanical Traction:         mins  11182;     Timed Treatment:  35    mins   Total Treatment:     35   mins  Joe Carey, PT  Physical Therapist

## 2021-03-17 ENCOUNTER — TREATMENT (OUTPATIENT)
Dept: PHYSICAL THERAPY | Facility: CLINIC | Age: 61
End: 2021-03-17

## 2021-03-17 DIAGNOSIS — Z47.89 ORTHOPEDIC AFTERCARE: ICD-10-CM

## 2021-03-17 DIAGNOSIS — M25.511 ACUTE PAIN OF RIGHT SHOULDER: Primary | ICD-10-CM

## 2021-03-17 PROCEDURE — 97530 THERAPEUTIC ACTIVITIES: CPT | Performed by: PHYSICAL THERAPIST

## 2021-03-17 PROCEDURE — 97110 THERAPEUTIC EXERCISES: CPT | Performed by: PHYSICAL THERAPIST

## 2021-03-17 NOTE — PROGRESS NOTES
Physical Therapy Daily Progress Note    Patient: Lynda Rueda   : 1960  Diagnosis/ICD-10 Code:  Acute pain of right shoulder [M25.511]  Referring practitioner: Juan Pablo Major MD  Date of Initial Visit: Type: THERAPY  Noted: 2020  Today's Date: 3/17/2021  Patient seen for 22 sessions           Subjective   5 wks s/p  Doing well. No shoulder pain. Motion is improving  Objective   See Exercise, Manual, and Modality Logs for complete treatment.       Assessment/Plan    (R) shoulder PROM 170 flexion/ abduction 165; AAROM 170/ 165.  Compliant with precautions/ restrictions. No pain with AROM with row.        Timed:    Manual Therapy:        mins  43391;  Therapeutic Exercise:   25      mins  61494;     Neuromuscular Erica:        mins  53653;    Therapeutic Activity:       10   mins  47076;     Gait Training:           mins  49861;     Ultrasound:          mins  33550;    Electrical Stimulation:         mins  95231 ( );  Iontophoresis         mins 80612;  Aquatic Therapy         mins 03709;  Dry Needling                   mins    Untimed:  Electrical Stimulation:         mins  07738 ( );  Mechanical Traction:         mins  20952;     Timed Treatment:    35  mins   Total Treatment:     35   mins  Joe Carey, PT  Physical Therapist

## 2021-03-19 ENCOUNTER — BULK ORDERING (OUTPATIENT)
Dept: CASE MANAGEMENT | Facility: OTHER | Age: 61
End: 2021-03-19

## 2021-03-19 DIAGNOSIS — Z23 IMMUNIZATION DUE: ICD-10-CM

## 2021-03-30 ENCOUNTER — TREATMENT (OUTPATIENT)
Dept: PHYSICAL THERAPY | Facility: CLINIC | Age: 61
End: 2021-03-30

## 2021-03-30 DIAGNOSIS — M25.511 ACUTE PAIN OF RIGHT SHOULDER: Primary | ICD-10-CM

## 2021-03-30 PROCEDURE — 97530 THERAPEUTIC ACTIVITIES: CPT | Performed by: PHYSICAL THERAPIST

## 2021-03-30 PROCEDURE — 97110 THERAPEUTIC EXERCISES: CPT | Performed by: PHYSICAL THERAPIST

## 2021-03-30 NOTE — PROGRESS NOTES
30-Day / 10-Visit Progress Note         Patient: Lynda Rueda   : 1960  Diagnosis/ICD-10 Code:  No primary diagnosis found.  Referring practitioner: Juan Pablo Major MD  Date of Initial Visit: Type: THERAPY  Noted: 2020  Today's Date: 3/30/2021  Patient seen for 23 sessions      Subjective:     Clinical Progress: improved  Home Program Compliance: Yes  Treatment has included:  therapeutic exercise    Subjective Evaluation    History of Present Illness    Subjective comment: 7 wks post RC repair with decompression. weaning out of sling in the last wkQuality of life: good    Pain  Current pain rating: 3  At best pain ratin  At worst pain ratin  Quality: tight, sharp, radiating and discomfort  Aggravating factors: outstretched reach, repetitive movement and movement         Objective   See Exercise, Manual, and Modality Logs for complete treatment.           Assessment & Plan     Assessment  Impairments: abnormal gait, abnormal or restricted ROM, impaired balance, impaired physical strength, pain with function and safety issue  Assessment details: Lynda Rueda is a 60 y.o. year-old female referred to physical therapy for s/p (R) RC repair with decompression. Pt currently is 7 wks s/p and is weaning out sling appropriately. She presents with a stable clinical presentation.  She has no comorbidities  and no personal factors that may affect her progress in the plan of care. Pt is compliant with precautions/ restrictions;  Pt demonstrates decreased (R) shoulder PROM/ AAROM/ AROM/ MMT throughout consistent with dx however they are improving weekly.  Pt reports 3/ 10 (R) shoulder pain at rest. Increased (R) shoulder PROM in flexion 172/ abduction 175;  AAROM 170 in flexion/ abduction 170; AROM in flexion 70 degrees prior to compensation (R) UT. MMT at this time (R) shoulder 3+/5 respectively.  Pt notes difficulty with dressing/ grooming/ bathing/ reaching tasks.  Signs and symptoms are  consistent with physical therapy diagnosis of (R) shoulder pain/ orthopedic aftercare.  Prognosis: good  Functional Limitations: carrying objects, lifting, walking, pulling, uncomfortable because of pain, standing, stooping, reaching behind back, reaching overhead and unable to perform repetitive tasks    Prognosis: good  Functional Limitations: walking, uncomfortable because of pain, standing and stooping  Goals  Plan Goals: Goals  Plan Goals: stg 6 wks    Pt to be educated/ independent with precautions-restrictions met    Increased (R) shoulder PROM/ AAROM 170 degrees in flexion/ abduction to allow for increased ease with dressing/ grooming activities. ongoing    Pt to demonstrate consistently correct sitting posture met    Pt to be independent with donning / doffing (R) shoulder sling.     Pt to demonstrate (R) shoulder flexion  AROM 90 degrees without compensation ongoing    ltg 12 wks.    Increase (R) shoulder AROM 170 degrees in flexion /abduction to allow for increased ease with dressing/ grooming/ bathing activities without evidence of compensation / symptoms of pain consistently.      Increased (R) RC scapular stabilization MMT to 4/5 at 90 degrees to allow for increased ease with modified lifting tasks.       Plan  Therapy options: will be seen for skilled physical therapy services  Planned modality interventions: cryotherapy, electrical stimulation/Russian stimulation, TENS, ultrasound and thermotherapy (hydrocollator packs)  Planned therapy interventions: abdominal trunk stabilization, manual therapy, motor coordination training, neuromuscular re-education, balance/weight-bearing training, ADL retraining, flexibility, functional ROM exercises, gait training, home exercise program, joint mobilization, transfer training, therapeutic activities, strengthening, stretching and soft tissue mobilization  Frequency: 2x week  Duration in weeks: 12  Treatment plan discussed with: patient           Recommendations:  Continue as planned  Timeframe: 1 month  Prognosis to achieve goals: good    PT Signature: Joe Carey, PT      Based upon review of the patient's progress and continued therapy plan, it is my medical opinion that Lynda Rueda should continue physical therapy treatment at [facility]  [address] [place] [address] [address] [place] [address]  [phone].    Signature: __________________________________  Juan Pablo Major MD    Timed:  Manual Therapy:        mins  31605;  Therapeutic Exercise:    15     mins  90132;     Neuromuscular Erica:       mins  71373;    Therapeutic Activity:       15   mins  93104;     Gait Training:           mins  26823;     Ultrasound:          mins  60024;    Electrical Stimulation:         mins  46863 ( );  Iontophoresis         mins 39501;  Dry Needling                   mins    Untimed:  Electrical Stimulation:         mins  44326 ( );  Mechanical Traction:         mins  41058;     Timed Treatment:  30    mins   Total Treatment:     30   mins

## 2021-04-02 ENCOUNTER — TREATMENT (OUTPATIENT)
Dept: PHYSICAL THERAPY | Facility: CLINIC | Age: 61
End: 2021-04-02

## 2021-04-02 DIAGNOSIS — Z47.89 ORTHOPEDIC AFTERCARE: ICD-10-CM

## 2021-04-02 DIAGNOSIS — M25.511 ACUTE PAIN OF RIGHT SHOULDER: Primary | ICD-10-CM

## 2021-04-02 PROCEDURE — 97530 THERAPEUTIC ACTIVITIES: CPT | Performed by: PHYSICAL THERAPIST

## 2021-04-02 PROCEDURE — 97110 THERAPEUTIC EXERCISES: CPT | Performed by: PHYSICAL THERAPIST

## 2021-04-02 NOTE — PROGRESS NOTES
Physical Therapy Daily Progress Note    Patient: Lynda Rueda   : 1960  Diagnosis/ICD-10 Code:  Acute pain of right shoulder [M25.511]  Referring practitioner: Juan Pablo Major MD  Date of Initial Visit: No linked episodes  Today's Date: 2021  Patient seen for Visit count could not be calculated. Make sure you are using a visit which is associated with an episode. sessions           Subjective sore on weds from exercises    Objective   See Exercise, Manual, and Modality Logs for complete treatment.       Assessment/Plan  Decreased resisted exercises. Implemented PROM/ AAROM/ AROM per pt tolerance. No pain with TE this visit.  AAROM in flexion 167/ ab 170; pt reports 2/ 10 ()R shoulder pain         Timed:    Manual Therapy:     mins  94775;  Therapeutic Exercise:   15      mins  74439;     Neuromuscular Erica:        mins  28382;    Therapeutic Activity:     25     mins  34584;     Gait Training:           mins  90551;     Ultrasound:          mins  77512;    Electrical Stimulation:         mins  74658 ( );  Iontophoresis         mins 62758;  Aquatic Therapy         mins 44355;  Dry Needling                   mins    Untimed:  Electrical Stimulation:         mins  89994 (MC );  Mechanical Traction:         mins  61996;     Timed Treatment:   40   mins   Total Treatment:     40   mins  Joe Carey, PT  Physical Therapist

## 2021-04-06 ENCOUNTER — TREATMENT (OUTPATIENT)
Dept: PHYSICAL THERAPY | Facility: CLINIC | Age: 61
End: 2021-04-06

## 2021-04-06 DIAGNOSIS — M25.511 ACUTE PAIN OF RIGHT SHOULDER: Primary | ICD-10-CM

## 2021-04-06 PROCEDURE — 97530 THERAPEUTIC ACTIVITIES: CPT | Performed by: PHYSICAL THERAPIST

## 2021-04-06 PROCEDURE — 97110 THERAPEUTIC EXERCISES: CPT | Performed by: PHYSICAL THERAPIST

## 2021-04-07 NOTE — PROGRESS NOTES
Physical Therapy Daily Progress Note    Patient: Lynda Rueda   : 1960  Diagnosis/ICD-10 Code:  Acute pain of right shoulder [M25.511]  Referring practitioner: Juan Pablo Major MD  Date of Initial Visit: Type: THERAPY  Noted: 2020  Today's Date: 2021  Patient seen for 24 sessions           Subjective shoulder doing fine today. Able to use it a little more.    Objective   See Exercise, Manual, and Modality Logs for complete treatment.       Assessment/Plan  Progressed with shoulder AAROM/ AROM per tolerance.  Implemented RC- scapular stabilization below 80 degrees without evidence of pain.  All stg met.         Timed:    Manual Therapy:        mins  14929;  Therapeutic Exercise:     10    mins  18780;     Neuromuscular Erica:        mins  38461;    Therapeutic Activity:    35      mins  23139;     Gait Training:           mins  46027;     Ultrasound:          mins  39189;    Electrical Stimulation:         mins  55684 ( );  Iontophoresis         mins 42856;  Aquatic Therapy         mins 36718;  Dry Needling                   mins    Untimed:  Electrical Stimulation:         mins  89842 ( );  Mechanical Traction:         mins  30194;     Timed Treatment:  35    mins   Total Treatment:  35   mins  Joe Carey, PT  Physical Therapist

## 2021-04-12 ENCOUNTER — TREATMENT (OUTPATIENT)
Dept: PHYSICAL THERAPY | Facility: CLINIC | Age: 61
End: 2021-04-12

## 2021-04-12 DIAGNOSIS — M25.511 ACUTE PAIN OF RIGHT SHOULDER: Primary | ICD-10-CM

## 2021-04-12 DIAGNOSIS — Z47.89 ORTHOPEDIC AFTERCARE: ICD-10-CM

## 2021-04-12 PROCEDURE — 97530 THERAPEUTIC ACTIVITIES: CPT | Performed by: PHYSICAL THERAPIST

## 2021-04-12 PROCEDURE — 97110 THERAPEUTIC EXERCISES: CPT | Performed by: PHYSICAL THERAPIST

## 2021-04-13 NOTE — PROGRESS NOTES
Physical Therapy Daily Progress Note    Patient: Lynda Rueda   : 1960  Diagnosis/ICD-10 Code:  Acute pain of right shoulder [M25.511]  Referring practitioner: Juan Pablo Major MD  Date of Initial Visit: Type: THERAPY  Noted: 2020  Today's Date: 2021  Patient seen for 25 sessions           Subjective pt reports fell (L) shoulder 4 days resulting in (R) shoulder soreness. Pt notes symptoms improving. No acute pain at rest (R) shoulder / 10 but soreness is noted.     Objective   See Exercise, Manual, and Modality Logs for complete treatment.       Assessment/Plan     Strength is demonstrated in (R) biceps/ scaption/ ER.  PT emphasis this session specific to PROM/ AAROM/ AROM; no strengthening this session secondary to increased soreness.           Timed:    Manual Therapy:        mins  94403;  Therapeutic Exercise:    25     mins  93534;     Neuromuscular Erica:        mins  07865;    Therapeutic Activity:      10    mins  63956;     Gait Training:           mins  27117;     Ultrasound:          mins  60597;    Electrical Stimulation:         mins  29700 ( );  Iontophoresis         mins 24175;  Aquatic Therapy         mins 56464;  Dry Needling                   mins    Untimed:  Electrical Stimulation:         mins  13478 ( );  Mechanical Traction:         mins  17365;     Timed Treatment:   35   mins   Total Treatment:     35   mins  Joe Carey, PT  Physical Therapist

## 2021-04-15 ENCOUNTER — TREATMENT (OUTPATIENT)
Dept: PHYSICAL THERAPY | Facility: CLINIC | Age: 61
End: 2021-04-15

## 2021-04-15 DIAGNOSIS — Z47.89 ORTHOPEDIC AFTERCARE: ICD-10-CM

## 2021-04-15 DIAGNOSIS — M25.511 ACUTE PAIN OF RIGHT SHOULDER: Primary | ICD-10-CM

## 2021-04-15 PROCEDURE — 97110 THERAPEUTIC EXERCISES: CPT | Performed by: PHYSICAL THERAPIST

## 2021-04-15 PROCEDURE — 97530 THERAPEUTIC ACTIVITIES: CPT | Performed by: PHYSICAL THERAPIST

## 2021-04-16 NOTE — PROGRESS NOTES
Physical Therapy Daily Progress Note    Patient: Lynda Rueda   : 1960  Diagnosis/ICD-10 Code:  Acute pain of right shoulder [M25.511]  Referring practitioner: Juan Pablo Major MD  Date of Initial Visit: Type: THERAPY  Noted: 2020  Today's Date: 2021  Patient seen for 26 sessions           Subjective   Mild soreness in (R) shoudler today 3 /10  Objective   See Exercise, Manual, and Modality Logs for complete treatment.       Assessment/Plan  Progressed with (R) shoulder PROM/ AAROM/ AROM per tolerance.  Implemented mild RC-scapular stabilization per tolerance.  Follows upwith MD next week         Timed:    Manual Therapy:      mins  37130;  Therapeutic Exercise:     15    mins  96542;     Neuromuscular Erica:        mins  58680;    Therapeutic Activity:       15   mins  81428;     Gait Training:           mins  94511;     Ultrasound:          mins  07358;    Electrical Stimulation:         mins  37375 ( );  Iontophoresis        mins 73797;  Aquatic Therapy         mins 37304;  Dry Needling                   mins    Untimed:  Electrical Stimulation:         mins  88868 ( );  Mechanical Traction:         mins  53194;     Timed Treatment:    30 mins   Total Treatment:     30  mins  Joe Carey, PT  Physical Therapist

## 2021-04-19 ENCOUNTER — TREATMENT (OUTPATIENT)
Dept: PHYSICAL THERAPY | Facility: CLINIC | Age: 61
End: 2021-04-19

## 2021-04-19 DIAGNOSIS — M25.511 ACUTE PAIN OF RIGHT SHOULDER: Primary | ICD-10-CM

## 2021-04-19 DIAGNOSIS — Z47.89 ORTHOPEDIC AFTERCARE: ICD-10-CM

## 2021-04-19 PROCEDURE — 97110 THERAPEUTIC EXERCISES: CPT | Performed by: PHYSICAL THERAPIST

## 2021-04-19 PROCEDURE — 97530 THERAPEUTIC ACTIVITIES: CPT | Performed by: PHYSICAL THERAPIST

## 2021-04-20 NOTE — PROGRESS NOTES
Physical Therapy Daily Progress Note    Patient: Lynda Rueda   : 1960  Diagnosis/ICD-10 Code:  Acute pain of right shoulder [M25.511]  Referring practitioner: Juan Pablo Major MD  Date of Initial Visit: Type: THERAPY  Noted: 2020  Today's Date: 2021  Patient seen for 27 sessions           Subjective mild soreness 3/ 10    Objective   See Exercise, Manual, and Modality Logs for complete treatment.       Assessment/Plan    Moderate ttp (R) supraspinatus/ bicipital groove.    PROM  AROM  Shoulder flex 167  155       Ab     130   115    MMT (R) scaption 3+5 ER 3+/5 biceps 4-/5     PT emphasis on PROM/ AAROM/ AROM per pt tolerance.  Review precautions/r estrctions with occupational tasks / activities in home.   Timed:    Manual Therapy:        mins  77622;  Therapeutic Exercise:    10     mins  92607;     Neuromuscular Erica:        mins  23268;    Therapeutic Activity:     25     mins  59779;     Gait Training:           mins  96541;     Ultrasound:          mins  69458;    Electrical Stimulation:         mins  31922 ( );  Iontophoresis         mins 11343;  Aquatic Therapy         mins 53204;  Dry Needling                   mins    Untimed:  Electrical Stimulation:         mins  67111 ( );  Mechanical Traction:         mins  34854;     Timed Treatment:   35   mins   Total Treatment:     35   mins  Joe Carey, PT  Physical Therapist

## 2021-04-21 ENCOUNTER — OFFICE VISIT (OUTPATIENT)
Dept: ORTHOPEDIC SURGERY | Facility: CLINIC | Age: 61
End: 2021-04-21

## 2021-04-21 VITALS — TEMPERATURE: 96.3 F | WEIGHT: 195 LBS | HEIGHT: 61 IN | BODY MASS INDEX: 36.82 KG/M2

## 2021-04-21 DIAGNOSIS — Z09 SURGERY FOLLOW-UP: Primary | ICD-10-CM

## 2021-04-21 PROCEDURE — 99024 POSTOP FOLLOW-UP VISIT: CPT | Performed by: ORTHOPAEDIC SURGERY

## 2021-04-21 RX ORDER — EMPAGLIFLOZIN 25 MG/1
TABLET, FILM COATED ORAL
COMMUNITY
Start: 2021-04-19 | End: 2021-04-21 | Stop reason: SDUPTHER

## 2021-04-21 RX ORDER — TRIAMCINOLONE ACETONIDE 1 MG/G
CREAM TOPICAL
COMMUNITY
Start: 2021-04-05

## 2021-04-21 NOTE — PROGRESS NOTES
Lynda Rueda : 1960 MRN: [id number] DATE: 2021    CC: 6 weeks s/p right shoulder rotator cuff repair    HPI: Pt. returns to clinic today for follow up.  Reports pain is well controlled and she is just taking Aleve/Tylenol at this point.  Reports compliance with the therapy.  She admits that she discontinued use of the sling.  She has had a couple of incidents since I last saw her.  She had one fall because of increased soreness.  She also had an incident last week at the school when she threw a bag with her right shoulder and felt a sharp pain in the shoulder.  The pain seems to have subsided.  She feels like things are steadily getting better.  She is happy with her progress.     Vitals:    21 0957   Temp: 96.3 °F (35.7 °C)       Exam:  Wounds appear well-healed.  Arm and forearm soft.  Shoulder moves fluidly and motion is on track per protocol.  Good motor and sensory function distally.  Palpable radial pulse with good cap refill.      Impression:  6 weeks s/p right shoulder rotator cuff repair,    Plan:    She needs to be more careful with her shoulder.  We talked about this and she acknowledged understanding of this information.  I want her to continue therapy per protocol.  I will see her back in another 6 weeks.  We can likely have her start to progress into some strengthening exercises at that point.    Juan Pablo Major MD

## 2021-04-22 ENCOUNTER — TREATMENT (OUTPATIENT)
Dept: PHYSICAL THERAPY | Facility: CLINIC | Age: 61
End: 2021-04-22

## 2021-04-22 DIAGNOSIS — M25.511 ACUTE PAIN OF RIGHT SHOULDER: Primary | ICD-10-CM

## 2021-04-22 DIAGNOSIS — Z47.89 ORTHOPEDIC AFTERCARE: ICD-10-CM

## 2021-04-22 PROCEDURE — 97110 THERAPEUTIC EXERCISES: CPT | Performed by: PHYSICAL THERAPIST

## 2021-04-22 PROCEDURE — 97530 THERAPEUTIC ACTIVITIES: CPT | Performed by: PHYSICAL THERAPIST

## 2021-04-23 NOTE — PROGRESS NOTES
Physical Therapy Daily Progress Note    Patient: Lynda Rueda   : 1960  Diagnosis/ICD-10 Code:  Acute pain of right shoulder [M25.511]  Referring practitioner: Juan Pablo Major MD  Date of Initial Visit: Type: THERAPY  Noted: 2020  Today's Date: 2021  Patient seen for 28 sessions           Subjective ortho said continue PT per protocol.  Pt is 10 wks s/p (R) RC repair    Objective   See Exercise, Manual, and Modality Logs for complete treatment.       Assessment/Plan  Increased (R) shoulder stiffness. PT emphasis this visit specific toPROM/ AAROM/ AROM per tolerance. Improved PROM post treatment in flexion 165/ abduction 155         Timed:    Manual Therapy:       mins  40386;  Therapeutic Exercise:      10   mins  28499;     Neuromuscular Erica:        mins  17686;    Therapeutic Activity:    25      mins  36621;     Gait Training:           mins  84874;     Ultrasound:          mins  22183;    Electrical Stimulation:         mins  21576 ( );  Iontophoresis         mins 77105;  Aquatic Therapy         mins 98912;  Dry Needling                   mins    Untimed:  Electrical Stimulation:         mins  99183 (MC );  Mechanical Traction:         mins  24301;     Timed Treatment:  35    mins   Total Treatment:    35   mins  Joe Carey, PT  Physical Therapist

## 2021-04-26 ENCOUNTER — TREATMENT (OUTPATIENT)
Dept: PHYSICAL THERAPY | Facility: CLINIC | Age: 61
End: 2021-04-26

## 2021-04-26 DIAGNOSIS — Z47.89 ORTHOPEDIC AFTERCARE: ICD-10-CM

## 2021-04-26 DIAGNOSIS — M25.511 ACUTE PAIN OF RIGHT SHOULDER: Primary | ICD-10-CM

## 2021-04-26 PROCEDURE — 97110 THERAPEUTIC EXERCISES: CPT | Performed by: PHYSICAL THERAPIST

## 2021-04-26 PROCEDURE — 97530 THERAPEUTIC ACTIVITIES: CPT | Performed by: PHYSICAL THERAPIST

## 2021-04-26 NOTE — PROGRESS NOTES
Physical Therapy Daily Progress Note    Patient: Lynda Rueda   : 1960  Diagnosis/ICD-10 Code:  Acute pain of right shoulder [M25.511]  Referring practitioner: Juan Pablo Major MD  Date of Initial Visit: Type: THERAPY  Noted: 2020  Today's Date: 2021  Patient seen for 29 sessions           Subjective shoulder feels stiff today. Trying to do my exercises    Objective   See Exercise, Manual, and Modality Logs for complete treatment.       Assessment/Plan    Progressed with (R) shoulder PROM/AAROM/AROM per tolerance. Pt compliant with precautions/ restrictions.       Timed:    Manual Therapy:       mins  39680;  Therapeutic Exercise:    8     mins  73075;     Neuromuscular Erica:        mins  45141;    Therapeutic Activity:  25        mins  87574;     Gait Training:           mins  45428;     Ultrasound:          mins  85774;    Electrical Stimulation:         mins  32407 ( );  Iontophoresis         mins 22564;  Aquatic Therapy         mins 13159;  Dry Needling                   mins    Untimed:  Electrical Stimulation:         mins  82154 ( );  Mechanical Traction:         mins  38200;     Timed Treatment:    33  mins   Total Treatment:     33   mins  Joe Carey, PT  Physical Therapist

## 2021-05-07 ENCOUNTER — TREATMENT (OUTPATIENT)
Dept: PHYSICAL THERAPY | Facility: CLINIC | Age: 61
End: 2021-05-07

## 2021-05-07 DIAGNOSIS — M25.511 ACUTE PAIN OF RIGHT SHOULDER: Primary | ICD-10-CM

## 2021-05-07 DIAGNOSIS — Z47.89 ORTHOPEDIC AFTERCARE: ICD-10-CM

## 2021-05-07 PROCEDURE — 97110 THERAPEUTIC EXERCISES: CPT | Performed by: PHYSICAL THERAPIST

## 2021-05-09 NOTE — PROGRESS NOTES
30-Day / 10-Visit Progress Note         Patient: Lynda Rueda   : 1960  Diagnosis/ICD-10 Code:  Acute pain of right shoulder [M25.511]  Referring practitioner: Juan Pablo Major MD  Date of Initial Visit: Type: THERAPY  Noted: 2020  Today's Date: 2021  Patient seen for 30 sessions      Subjective:     Clinical Progress: improved  Home Program Compliance: Yes  Treatment has included:  therapeutic exercise    Subjective Evaluation    History of Present Illness    Subjective comment: i have to confess i think im using it too much a work and not stretching it enough at home. shoulder is sore and stiff.     Objective     See Exercise, Manual, and Modality Logs for complete treatment.         Assessment & Plan     Assessment  Impairments: abnormal gait, abnormal or restricted ROM, impaired balance, impaired physical strength, pain with function and safety issue  Assessment details: Lynda Rueda is a 60 y.o. year-old female referred to physical therapy for s/p (R) RC repair with decompression. Pt currently is 12 wks s/p. She presents with a stable clinical presentation.  She has no comorbidities  and no personal factors that may affect her progress in the plan of care. Pt reports increased use of (R) UE at work resulting in increased soreness/ stiffness. Pt reports 5/ 10 (R) shoulder pain. PT reviewed precautions/ restrictions per protocol. (R) shoulder PROM in flexion 150/ abduction 162;  AAROM 160 in flexion/ abduction 170; AROM in flexion 70 degrees prior to compensation (R) UT. MMT at this time (R) shoulder 3+/5 respectively.  Pt notes difficulty with dressing/ grooming/ bathing/ reaching tasks.  Signs and symptoms are consistent with physical therapy diagnosis of (R) shoulder pain/ orthopedic aftercare.  Prognosis: good  Functional Limitations: carrying objects, lifting, walking, pulling, uncomfortable because of pain, standing, stooping, reaching behind back, reaching overhead and unable  to perform repetitive tasks    Prognosis: good  Functional Limitations: walking, uncomfortable because of pain, standing and stooping    Prognosis: good  Functional Limitations: walking, uncomfortable because of pain, standing and stooping  Goals  Plan Goals: Goals  Plan Goals: Goals  Plan Goals: stg 6 wks    Pt to be educated/ independent with precautions-restrictions met    Increased (R) shoulder PROM/ AAROM 170 degrees in flexion/ abduction to allow for increased ease with dressing/ grooming activities. ongoing    Pt to demonstrate consistently correct sitting posture met    Pt to be independent with donning / doffing (R) shoulder sling.     Pt to demonstrate (R) shoulder flexion  AROM 90 degrees without compensation ongoing    ltg 12 wks.    Increase (R) shoulder AROM 170 degrees in flexion /abduction to allow for increased ease with dressing/ grooming/ bathing activities without evidence of compensation / symptoms of pain consistently.ongoing      Increased (R) RC scapular stabilization MMT to 4/5 at 90 degrees to allow for increased ease with modified lifting tasks.  ongoing      Plan  Therapy options: will be seen for skilled physical therapy services  Planned modality interventions: cryotherapy, electrical stimulation/Russian stimulation, TENS, ultrasound and thermotherapy (hydrocollator packs)  Planned therapy interventions: abdominal trunk stabilization, manual therapy, motor coordination training, neuromuscular re-education, balance/weight-bearing training, ADL retraining, flexibility, functional ROM exercises, gait training, home exercise program, joint mobilization, transfer training, therapeutic activities, strengthening, stretching and soft tissue mobilization  Duration in weeks: 12  Treatment plan discussed with: patient           Recommendations: Continue as planned  Timeframe: 1 month  Prognosis to achieve goals: good    PT Signature: Joe Carey, PT      Based upon review of the patient's  progress and continued therapy plan, it is my medical opinion that Lynda Rueda should continue physical therapy treatment at [facility]  [address] [place] [address] [address] [place] [address] [phone].    Signature: __________________________________  Juan Pablo Major MD    Timed:  Manual Therapy:         mins  72162;  Therapeutic Exercise:      40   mins  05998;     Neuromuscular Erica:        mins  12328;    Therapeutic Activity:          mins  94218;     Gait Training:           mins  77378;     Ultrasound:          mins  68070;    Electrical Stimulation:         mins  45011 ( );  Iontophoresis         mins 12454;  Dry Needling                   mins    Untimed:  Electrical Stimulation:         mins  71735 ( );  Mechanical Traction:         mins  76359;     Timed Treatment:   40   mins   Total Treatment:      40  mins

## 2021-05-10 ENCOUNTER — TREATMENT (OUTPATIENT)
Dept: PHYSICAL THERAPY | Facility: CLINIC | Age: 61
End: 2021-05-10

## 2021-05-10 DIAGNOSIS — Z47.89 ORTHOPEDIC AFTERCARE: ICD-10-CM

## 2021-05-10 DIAGNOSIS — M25.511 ACUTE PAIN OF RIGHT SHOULDER: Primary | ICD-10-CM

## 2021-05-10 PROCEDURE — 97530 THERAPEUTIC ACTIVITIES: CPT | Performed by: PHYSICAL THERAPIST

## 2021-05-10 PROCEDURE — 97110 THERAPEUTIC EXERCISES: CPT | Performed by: PHYSICAL THERAPIST

## 2021-05-11 NOTE — PROGRESS NOTES
Physical Therapy Daily Progress Note    Patient: Lynda Rueda   : 1960  Diagnosis/ICD-10 Code:  Acute pain of right shoulder [M25.511]  Referring practitioner: Juan Pablo Major MD  Date of Initial Visit: Type: THERAPY  Noted: 2020  Today's Date: 2021  Patient seen for 31 sessions           Subjective shoulder feels better. I didn't use it for 3 days    Objective   See Exercise, Manual, and Modality Logs for complete treatment.       Assessment/Plan  Pt compliant with precautions. Progressed with (R) shoulder PROM/ AAROM; improved (R) PROM in flexion 168/ abduction 170;  Marked decrease in soreness improving AAROM.          Timed:    Manual Therapy:         mins  66161;  Therapeutic Exercise:    25     mins  58125;     Neuromuscular Erica:        mins  53072;    Therapeutic Activity:     10     mins  21376;     Gait Training:           mins  45928;     Ultrasound:          mins  04909;    Electrical Stimulation:         mins  70224 ( );  Iontophoresis        mins 26328;  Aquatic Therapy         mins 71350;  Dry Needling                   mins    Untimed:  Electrical Stimulation:         mins  51852 ( );  Mechanical Traction:         mins  83696;     Timed Treatment:     35 mins   Total Treatment:      35  mins  Joe Carey, PT  Physical Therapist

## 2021-05-18 ENCOUNTER — TREATMENT (OUTPATIENT)
Dept: PHYSICAL THERAPY | Facility: CLINIC | Age: 61
End: 2021-05-18

## 2021-05-18 DIAGNOSIS — M25.562 CHRONIC PAIN OF LEFT KNEE: ICD-10-CM

## 2021-05-18 DIAGNOSIS — Z47.89 ORTHOPEDIC AFTERCARE: ICD-10-CM

## 2021-05-18 DIAGNOSIS — M25.511 ACUTE PAIN OF RIGHT SHOULDER: Primary | ICD-10-CM

## 2021-05-18 DIAGNOSIS — G89.29 CHRONIC PAIN OF LEFT KNEE: ICD-10-CM

## 2021-05-18 PROCEDURE — 97530 THERAPEUTIC ACTIVITIES: CPT | Performed by: PHYSICAL THERAPIST

## 2021-05-18 PROCEDURE — 97110 THERAPEUTIC EXERCISES: CPT | Performed by: PHYSICAL THERAPIST

## 2021-05-18 NOTE — PROGRESS NOTES
Physical Therapy Daily Progress Note    Patient: Lynda Rueda   : 1960  Diagnosis/ICD-10 Code:  Acute pain of right shoulder [M25.511]  Referring practitioner: Juan Pablo Major MD  Date of Initial Visit: Type: THERAPY  Noted: 2020  Today's Date: 2021  Patient seen for 32 sessions           Subjective shoulder not real sore today    Objective   See Exercise, Manual, and Modality Logs for complete treatment.       Assessment/Plan  Progressed with RC-scapular stabilization below 90; improving endurance with UE strengthening. No UT compensation. Fatigued at conclusion.. improved today.         Timed:    Manual Therapy:       mins  68148;  Therapeutic Exercise:     15    mins  38879;     Neuromuscular Erica:        mins  12244;    Therapeutic Activity:   25       mins  24592;     Gait Training:           mins  09445;     Ultrasound:          mins  06439;    Electrical Stimulation:         mins  42455 ( );  Iontophoresis         mins 31297;  Aquatic Therapy         mins 98146;  Dry Needling                   mins    Untimed:  Electrical Stimulation:         mins  30583 ( );  Mechanical Traction:         mins  59668;     Timed Treatment:  40    mins   Total Treatment:     40   mins  Joe Carey, PT  Physical Therapist

## 2021-05-21 ENCOUNTER — TREATMENT (OUTPATIENT)
Dept: PHYSICAL THERAPY | Facility: CLINIC | Age: 61
End: 2021-05-21

## 2021-05-21 DIAGNOSIS — M25.511 ACUTE PAIN OF RIGHT SHOULDER: Primary | ICD-10-CM

## 2021-05-21 DIAGNOSIS — Z47.89 ORTHOPEDIC AFTERCARE: ICD-10-CM

## 2021-05-21 PROCEDURE — 97110 THERAPEUTIC EXERCISES: CPT | Performed by: PHYSICAL THERAPIST

## 2021-05-21 PROCEDURE — 97530 THERAPEUTIC ACTIVITIES: CPT | Performed by: PHYSICAL THERAPIST

## 2021-05-24 NOTE — PROGRESS NOTES
Physical Therapy Daily Progress Note    Patient: Lynda Rueda   : 1960  Diagnosis/ICD-10 Code:  Acute pain of right shoulder [M25.511]  Referring practitioner: Juan Pablo Major MD  Date of Initial Visit: Type: THERAPY  Noted: 2020  Today's Date: 2021  Patient seen for 33 sessions           Subjective soreness is improving.  Motion seems better    Objective   See Exercise, Manual, and Modality Logs for complete treatment.       Assessment/Plan    Decreased pain (R) shoulder.   Progressed with (R) shoulder AROM/ AAROM; implemented rc-scapular stabilization. No pain with strengthening below 90 degrees this visit. Mild cuing with HEP.        Timed:    Manual Therapy:     mins  83372;  Therapeutic Exercise:    10     mins  78644;     Neuromuscular Erica:        mins  17358;    Therapeutic Activity:    25      mins  44080;     Gait Training:           mins  56321;     Ultrasound:          mins  15627;    Electrical Stimulation:         mins  88619 ( );  Iontophoresis         mins 08304;  Aquatic Therapy         mins 33454;  Dry Needling                   mins    Untimed:  Electrical Stimulation:         mins  37327 ( );  Mechanical Traction:         mins  04835;     Timed Treatment:    35  mins   Total Treatment:    35   mins  Joe Carey, TYSON  Physical Therapist

## 2021-05-25 ENCOUNTER — TREATMENT (OUTPATIENT)
Dept: PHYSICAL THERAPY | Facility: CLINIC | Age: 61
End: 2021-05-25

## 2021-05-25 DIAGNOSIS — M25.511 ACUTE PAIN OF RIGHT SHOULDER: Primary | ICD-10-CM

## 2021-05-25 DIAGNOSIS — Z47.89 ORTHOPEDIC AFTERCARE: ICD-10-CM

## 2021-05-25 PROCEDURE — 97530 THERAPEUTIC ACTIVITIES: CPT | Performed by: PHYSICAL THERAPIST

## 2021-05-25 PROCEDURE — 97110 THERAPEUTIC EXERCISES: CPT | Performed by: PHYSICAL THERAPIST

## 2021-05-26 NOTE — PROGRESS NOTES
Physical Therapy Daily Progress Note    Patient: Lynda Rueda   : 1960  Diagnosis/ICD-10 Code:  Acute pain of right shoulder [M25.511]  Referring practitioner: Juan Pablo Major MD  Date of Initial Visit: Type: THERAPY  Noted: 2020  Today's Date: 2021  Patient seen for 34 sessions           Subjective I used my arm a lot today at work. Sore this afternoon    Objective   See Exercise, Manual, and Modality Logs for complete treatment.       Assessment/Plan  Progressed with shoulder PROM/ AAROM; implemented mild RC-scapular stabilization per tolerance.  No pain with pre. Fatigued at conclusion         Timed:    Manual Therapy:        mins  78756;  Therapeutic Exercise:     15    mins  76980;     Neuromuscular Erica:        mins  75722;    Therapeutic Activity:      25    mins  70387;     Gait Training:           mins  99864;     Ultrasound:          mins  83398;    Electrical Stimulation:         mins  72625 ( );  Iontophoresis        mins 76965;  Aquatic Therapy         mins 80207;  Dry Needling                   mins    Untimed:  Electrical Stimulation:         mins  97091 ( );  Mechanical Traction:         mins  02365;     Timed Treatment:  40    mins   Total Treatment:   40     mins  Joe Carey, PT  Physical Therapist

## 2021-05-28 ENCOUNTER — TREATMENT (OUTPATIENT)
Dept: PHYSICAL THERAPY | Facility: CLINIC | Age: 61
End: 2021-05-28

## 2021-05-28 DIAGNOSIS — M25.511 ACUTE PAIN OF RIGHT SHOULDER: ICD-10-CM

## 2021-05-28 DIAGNOSIS — Z47.89 ORTHOPEDIC AFTERCARE: Primary | ICD-10-CM

## 2021-05-28 PROCEDURE — 97110 THERAPEUTIC EXERCISES: CPT | Performed by: PHYSICAL THERAPIST

## 2021-05-28 PROCEDURE — 97530 THERAPEUTIC ACTIVITIES: CPT | Performed by: PHYSICAL THERAPIST

## 2021-06-01 ENCOUNTER — TREATMENT (OUTPATIENT)
Dept: PHYSICAL THERAPY | Facility: CLINIC | Age: 61
End: 2021-06-01

## 2021-06-01 DIAGNOSIS — M25.511 ACUTE PAIN OF RIGHT SHOULDER: Primary | ICD-10-CM

## 2021-06-01 DIAGNOSIS — Z47.89 ORTHOPEDIC AFTERCARE: ICD-10-CM

## 2021-06-01 PROCEDURE — 97110 THERAPEUTIC EXERCISES: CPT | Performed by: PHYSICAL THERAPIST

## 2021-06-01 NOTE — PROGRESS NOTES
Physical Therapy Daily Progress Note    Patient: Lynda Rueda   : 1960  Diagnosis/ICD-10 Code:  No primary diagnosis found.  Referring practitioner: Juan Pablo Major MD  Date of Initial Visit: No linked episodes  Today's Date: 2021  Patient seen for Visit count could not be calculated. Make sure you are using a visit which is associated with an episode. sessions           Subjective shoulder feels pretty good today. Mild soreness.     Objective   See Exercise, Manual, and Modality Logs for complete treatment.       Assessment/Plan  Progressed with PROM/ AAROM/ AROM per pt tolerance . In flexion AAROM 169; abduction 166.    Decreased ms guarding this AM.  Minimal compliants of pain with pre.          Timed:    Manual Therapy:      mins  51595;  Therapeutic Exercise:  25       mins  43419;     Neuromuscular Erica:        mins  90242;    Therapeutic Activity:          mins  61706;     Gait Training:           mins  86415;     Ultrasound:          mins  33617;    Electrical Stimulation:         mins  28647 ( );  Iontophoresis         mins 04416;  Aquatic Therapy         mins 72286;  Dry Needling                   mins    Untimed:  Electrical Stimulation:         mins  87835 (MC );  Mechanical Traction:         mins  23337;     Timed Treatment:  25    mins   Total Treatment:     25   mins  Joe Carey, PT  Physical Therapist

## 2021-06-01 NOTE — PROGRESS NOTES
Physical Therapy Daily Progress Note    Patient: Lynda Rueda   : 1960  Diagnosis/ICD-10 Code:  Orthopedic aftercare [Z47.89]  Referring practitioner: Juan Pablo Major MD  Date of Initial Visit: Type: THERAPY  Noted: 2020  Today's Date: 2021  Patient seen for 35 sessions           Subjective shoulder feels tired from work    Objective   See Exercise, Manual, and Modality Logs for complete treatment.       Assessment/Plan  Progressed with RC-scapular stabilization below 90. Strong emphasis this visit on PROM/ AAROM as well to ensure improving ROM.           Timed:    Manual Therapy:        mins  55780;  Therapeutic Exercise:    15     mins  86840;     Neuromuscular Erica:        mins  20817;    Therapeutic Activity:   25       mins  86824;     Gait Training:           mins  23753;     Ultrasound:          mins  53977;    Electrical Stimulation:         mins  00358 ( );  Iontophoresis         mins 24980;  Aquatic Therapy         mins 20843;  Dry Needling                   mins    Untimed:  Electrical Stimulation:         mins  36792 (MC );  Mechanical Traction:         mins  22139;     Timed Treatment:     40 mins   Total Treatment:     40   mins  Joe Carey PT  Physical Therapist

## 2021-06-02 ENCOUNTER — OFFICE VISIT (OUTPATIENT)
Dept: ORTHOPEDIC SURGERY | Facility: CLINIC | Age: 61
End: 2021-06-02

## 2021-06-02 VITALS — TEMPERATURE: 96.9 F | WEIGHT: 195 LBS | BODY MASS INDEX: 36.82 KG/M2 | HEIGHT: 61 IN

## 2021-06-02 DIAGNOSIS — Z09 SURGERY FOLLOW-UP: Primary | ICD-10-CM

## 2021-06-02 PROCEDURE — 99212 OFFICE O/P EST SF 10 MIN: CPT | Performed by: ORTHOPAEDIC SURGERY

## 2021-06-02 PROCEDURE — 20610 DRAIN/INJ JOINT/BURSA W/O US: CPT | Performed by: ORTHOPAEDIC SURGERY

## 2021-06-02 RX ORDER — METHYLPREDNISOLONE ACETATE 80 MG/ML
80 INJECTION, SUSPENSION INTRA-ARTICULAR; INTRALESIONAL; INTRAMUSCULAR; SOFT TISSUE
Status: COMPLETED | OUTPATIENT
Start: 2021-06-02 | End: 2021-06-02

## 2021-06-02 RX ORDER — LIDOCAINE HYDROCHLORIDE 20 MG/ML
2 INJECTION, SOLUTION EPIDURAL; INFILTRATION; INTRACAUDAL; PERINEURAL
Status: COMPLETED | OUTPATIENT
Start: 2021-06-02 | End: 2021-06-02

## 2021-06-02 RX ADMIN — METHYLPREDNISOLONE ACETATE 80 MG: 80 INJECTION, SUSPENSION INTRA-ARTICULAR; INTRALESIONAL; INTRAMUSCULAR; SOFT TISSUE at 08:50

## 2021-06-02 RX ADMIN — LIDOCAINE HYDROCHLORIDE 2 ML: 20 INJECTION, SOLUTION EPIDURAL; INFILTRATION; INTRACAUDAL; PERINEURAL at 08:50

## 2021-06-02 NOTE — PROGRESS NOTES
Lynda Rueda : 1960 MRN: [id number] DATE: 2021     CC: 3 months s/p right shoulder arthroscopy with rotator cuff repair    HPI: Pt. returns to clinic today for follow up of her right shoulder.  She is now almost 4 months out from surgery.  She is still having a fair amount of discomfort.  She says she feels like the pain she has now is at times even worse than what she had before surgery.  She does admit progress in therapy.  She says her motion is coming along but is not quite back to normal.  She just started working on progressive strengthening about a week ago.    Vitals:    21 0834   Temp: 96.9 °F (36.1 °C)       Exam:  Wounds appear well-healed.  Arm and forearm soft.  Shoulder motion is improved but behind where I would expect her to be.  Her forward elevation is roughly 160 degrees.  She lacks 5 to 10 degrees of horizontal abduction.  Internal rotation is to roughly L2.  External rotation with the elbow at the side is to about 45 degrees versus 75 degrees on the contralateral side.  She hikes her shoulder to elevate through mid arc scaption but she does not have much pain with this maneuver.  Good motor and sensory function in the hand and wrist.  Palpable radial pulse with good cap refill.      Impression: 3-months s/p right shoulder rotator cuff repair    Plan:    Her motion is behind and I consider that she has a component of postoperative adhesive capsulitis.  I think she will be able to work through this with time.  We may have to consider manipulation if she fails to progress but she does report that things are improving so I am inclined to give it more time.  An injection may help with her persistent discomfort.  The risk, benefits and alternatives were discussed.  She consented and a subacromial injection was performed as described below.  I want her to continue her therapy efforts.  She needs to focus on both range of motion and strengthening.  I will see her back in 6  lashaun.    Juan Pablo Major MD    06/02/2021     Large Joint Arthrocentesis: R subacromial bursa  Date/Time: 6/2/2021 8:50 AM  Consent given by: patient  Site marked: site marked  Timeout: Immediately prior to procedure a time out was called to verify the correct patient, procedure, equipment, support staff and site/side marked as required   Supporting Documentation  Indications: pain   Procedure Details  Location: shoulder - R subacromial bursa  Preparation: Patient was prepped and draped in the usual sterile fashion  Needle gauge: 21G.  Approach: posterior  Medications administered: 80 mg methylPREDNISolone acetate 80 MG/ML; 2 mL lidocaine PF 2% 2 %  Patient tolerance: patient tolerated the procedure well with no immediate complications

## 2021-06-04 ENCOUNTER — TREATMENT (OUTPATIENT)
Dept: PHYSICAL THERAPY | Facility: CLINIC | Age: 61
End: 2021-06-04

## 2021-06-04 DIAGNOSIS — M25.511 ACUTE PAIN OF RIGHT SHOULDER: ICD-10-CM

## 2021-06-04 DIAGNOSIS — Z47.89 ORTHOPEDIC AFTERCARE: Primary | ICD-10-CM

## 2021-06-04 PROCEDURE — 97530 THERAPEUTIC ACTIVITIES: CPT | Performed by: PHYSICAL THERAPIST

## 2021-06-04 PROCEDURE — 97110 THERAPEUTIC EXERCISES: CPT | Performed by: PHYSICAL THERAPIST

## 2021-06-04 NOTE — PROGRESS NOTES
Physical Therapy Daily Progress Note    Patient: Lynda Rueda   : 1960  Diagnosis/ICD-10 Code:  Orthopedic aftercare [Z47.89]  Referring practitioner: Juan Pablo Major MD  Date of Initial Visit: Type: THERAPY  Noted: 2020  Today's Date: 2021  Patient seen for 37 sessions           Subjective got a shot in my shoulder seems to help    Objective   See Exercise, Manual, and Modality Logs for complete treatment.       Assessment/Plan    Improved (R) shoulder PROM  170 flexion/ 175 abduction .  Progressed with rc-scapular stabilization below 90 degrees.  All stg met.        Timed:    Manual Therapy:      15    mins  05191;     Neuromuscular Erica:        mins  09018;    Therapeutic Activity:     25     mins  58656;     Gait Training:           mins  96277;     Ultrasound:          mins  11218;    Electrical Stimulation:         mins  01921 ( );  Iontophoresis         mins 18277;  Aquatic Therapy         mins 53744;  Dry Needling                   mins    Untimed:  Electrical Stimulation:         mins  45003 ( );  Mechanical Traction:         mins  44476;     Timed Treatment:  40   mins   Total Treatment:    40   mins  Joe Carey, PT  Physical Therapist

## 2021-06-07 ENCOUNTER — TREATMENT (OUTPATIENT)
Dept: PHYSICAL THERAPY | Facility: CLINIC | Age: 61
End: 2021-06-07

## 2021-06-07 DIAGNOSIS — M25.511 ACUTE PAIN OF RIGHT SHOULDER: Primary | ICD-10-CM

## 2021-06-07 DIAGNOSIS — Z47.89 ORTHOPEDIC AFTERCARE: ICD-10-CM

## 2021-06-07 PROCEDURE — 97530 THERAPEUTIC ACTIVITIES: CPT | Performed by: PHYSICAL THERAPIST

## 2021-06-07 PROCEDURE — 97110 THERAPEUTIC EXERCISES: CPT | Performed by: PHYSICAL THERAPIST

## 2021-06-07 NOTE — PROGRESS NOTES
Physical Therapy Daily Progress Note    Patient: Lynda Rueda   : 1960  Diagnosis/ICD-10 Code:  Acute pain of right shoulder [M25.511]  Referring practitioner: Juan Pablo Major MD  Date of Initial Visit: Type: THERAPY  Noted: 2020  Today's Date: 2021  Patient seen for 38 sessions           Subjective shoulder starting to feel good.     Objective   See Exercise, Manual, and Modality Logs for complete treatment.       Assessment/Plan  Progressed with shoulder AAROM/ AROM; implemented RC-scapular stabilization. MMT at 90 in flexion 4/5; above 90 4-/5; increased (R) shoulder PROM in flexion 175/ abduction 172.          Timed:    Manual Therapy:         mins  61074;  Therapeutic Exercise:    25     mins  61317;     Neuromuscular Erica:        mins  45258;    Therapeutic 15       mins  78007;     Gait Training:           mins  41674;     Ultrasound:          mins  23308;    Electrical Stimulation:         mins  06674 ( );  Iontophoresis         mins 49003;  Aquatic Therapy         mins 48723;  Dry Needling                   mins    Untimed:  Electrical Stimulation:         mins  49343 ( );  Mechanical Traction:         mins  89197;     Timed Treatment:   40   mins   Total Treatment:     40   mins  Joe Carey PT  Physical Therapist

## 2021-06-11 ENCOUNTER — TREATMENT (OUTPATIENT)
Dept: PHYSICAL THERAPY | Facility: CLINIC | Age: 61
End: 2021-06-11

## 2021-06-11 DIAGNOSIS — Z47.89 ORTHOPEDIC AFTERCARE: Primary | ICD-10-CM

## 2021-06-11 DIAGNOSIS — M25.511 ACUTE PAIN OF RIGHT SHOULDER: ICD-10-CM

## 2021-06-11 PROCEDURE — 97110 THERAPEUTIC EXERCISES: CPT | Performed by: PHYSICAL THERAPIST

## 2021-06-11 PROCEDURE — 97530 THERAPEUTIC ACTIVITIES: CPT | Performed by: PHYSICAL THERAPIST

## 2021-06-11 NOTE — PROGRESS NOTES
30-Day / 10-Visit Progress Note         Patient: Lynda Rueda   : 1960  Diagnosis/ICD-10 Code:  Orthopedic aftercare [Z47.89]  Referring practitioner: Juan Pablo Major MD  Date of Initial Visit: Type: THERAPY  Noted: 2020  Today's Date: 2021  Patient seen for 39 sessions      Subjective:     Clinical Progress: improved  Home Program Compliance: Yes  Treatment has included:  therapeutic exercise    Subjective   Objective     See Exercise, Manual, and Modality Logs for complete treatment.         Assessment & Plan     Assessment  Impairments: abnormal gait, abnormal or restricted ROM, impaired balance, impaired physical strength, pain with function and safety issue  Assessment details: Lynda Rueda is a 60 y.o. year-old female referred to physical therapy for s/p (R) RC repair with decompression. Pt currently is 16wks s/p. She presents with a stable clinical presentation.  She has no comorbidities  and no personal factors that may affect her progress in the plan of care. Pt reports increased use of (R) UE at work resulting in increased soreness/ stiffness. Pt reports 2/ 10 (R) shoulder pain. PT reviewed precautions/ restrictions per protocol. (R) shoulder PROM in flexion 167/ abduction 170;  AROM 1605 in flexion/ abduction 168;  MMT at this time is improving (R) shoulder 4-/5 respectively at 90 degrees. Pt notes improving independence with dressing/ grooming activities, notes difficulty with modified lifting tasks. Signs and symptoms are consistent with physical therapy diagnosis of (R) shoulder pain/ orthopedic aftercare.  Prognosis: good  Functional Limitations: carrying objects, lifting, walking, pulling, uncomfortable because of pain, standing, stooping, reaching behind back, reaching overhead and unable to perform repetitive tasks    Prognosis: good  Functional Limitations: walking, uncomfortable because of pain, standing and stooping    Prognosis: good  Functional Limitations:  walking, uncomfortable because of pain, standing and stooping    Prognosis: good  Functional Limitations: walking, uncomfortable because of pain, standing and stooping  Goals  Plan Goals: Goals  Plan Goals: stg 6 wks    Pt to be educated/ independent with precautions-restrictions met    Increased (R) shoulder PROM/ AAROM 170 degrees in flexion/ abduction to allow for increased ease with dressing/ grooming activities. ongoing    Pt to demonstrate consistently correct sitting posture met    Pt to be independent with donning / doffing (R) shoulder sling. met    Pt to demonstrate (R) shoulder flexion  AROM 170 degrees without compensation ongoing    ltg 12 wks.    Increase (R) shoulder AROM 170 degrees in flexion /abduction to allow for increased ease with dressing/ grooming/ bathing activities without evidence of compensation / symptoms of pain consistently.ongoing      Increased (R) RC scapular stabilization MMT to 4/5 at 90 degrees to allow for increased ease with modified lifting tasks.  ongoing      Plan  Therapy options: will be seen for skilled physical therapy services  Planned modality interventions: cryotherapy, electrical stimulation/Russian stimulation, TENS, ultrasound and thermotherapy (hydrocollator packs)  Planned therapy interventions: abdominal trunk stabilization, manual therapy, motor coordination training, neuromuscular re-education, balance/weight-bearing training, ADL retraining, flexibility, functional ROM exercises, gait training, home exercise program, joint mobilization, transfer training, therapeutic activities, strengthening, stretching and soft tissue mobilization  Frequency: 2x week  Duration in weeks: 12  Treatment plan discussed with: patient           Recommendations: Continue as planned  Timeframe: 1 month  Prognosis to achieve goals: good    PT Signature: Joe Carey, PT      Based upon review of the patient's progress and continued therapy plan, it is my medical opinion that  Lynda Marybeth should continue physical therapy treatment at [facility]  [address]   [place] [address] [phone].    Signature: __________________________________  Juan Pablo Major MD    Timed:  Manual Therapy:         mins  66456;  Therapeutic Exercise:    15     mins  87270;     Neuromuscular Erica:        mins  92028;    Therapeutic Activity:       25   mins  36015;     Gait Training:           mins  73936;     Ultrasound:          mins  92314;    Electrical Stimulation:         mins  05986 ( );  Iontophoresis         mins 68412;  Dry Needling                   mins    Untimed:  Electrical Stimulation:         mins  03633 ( );  Mechanical Traction:         mins  59561;     Timed Treatment:  40    mins   Total Treatment:      40  mins

## 2021-06-18 ENCOUNTER — TREATMENT (OUTPATIENT)
Dept: PHYSICAL THERAPY | Facility: CLINIC | Age: 61
End: 2021-06-18

## 2021-06-18 DIAGNOSIS — Z47.89 ORTHOPEDIC AFTERCARE: ICD-10-CM

## 2021-06-18 DIAGNOSIS — M25.511 ACUTE PAIN OF RIGHT SHOULDER: Primary | ICD-10-CM

## 2021-06-18 PROCEDURE — 97110 THERAPEUTIC EXERCISES: CPT | Performed by: PHYSICAL THERAPIST

## 2021-06-18 PROCEDURE — 97530 THERAPEUTIC ACTIVITIES: CPT | Performed by: PHYSICAL THERAPIST

## 2021-06-18 NOTE — PROGRESS NOTES
Physical Therapy Daily Progress Note    Patient: Lynda Rueda   : 1960  Diagnosis/ICD-10 Code:  Acute pain of right shoulder [M25.511]  Referring practitioner: Juan Pablo Major MD  Date of Initial Visit: Type: THERAPY  Noted: 2020  Today's Date: 2021  Patient seen for 40 sessions           Subjective trying to do my exercises at home. Shoulder feeling strong. Having difficulty reaching behind my back    Objective   See Exercise, Manual, and Modality Logs for complete treatment.       Assessment/Plan  PT emphasis on IR with PROM/ AAROM; oriented to IR stretch with pulleys encouraged increase IR stretch with pulleys at home.  IR AROM68 degrees.          Timed:    Manual Therapy:         mins  77150;  Therapeutic Exercise:     27    mins  80565;     Neuromuscular Erica:        mins  04597;    Therapeutic Activity:     8     mins  09607;     Gait Training:           mins  06082;     Ultrasound:          mins  89510;    Electrical Stimulation:         mins  95553 ( );  Iontophoresis         mins 99302;  Aquatic Therapy         mins 88909;  Dry Needling                   mins    Untimed:  Electrical Stimulation:         mins  74359 ( );  Mechanical Traction:         mins  67251;     Timed Treatment:  35    mins   Total Treatment:     35   mins  Joe Carey, PT  Physical Therapist

## 2021-06-22 ENCOUNTER — TREATMENT (OUTPATIENT)
Dept: PHYSICAL THERAPY | Facility: CLINIC | Age: 61
End: 2021-06-22

## 2021-06-22 DIAGNOSIS — Z47.89 ORTHOPEDIC AFTERCARE: Primary | ICD-10-CM

## 2021-06-22 DIAGNOSIS — M25.511 ACUTE PAIN OF RIGHT SHOULDER: ICD-10-CM

## 2021-06-22 PROCEDURE — 97530 THERAPEUTIC ACTIVITIES: CPT | Performed by: PHYSICAL THERAPIST

## 2021-06-22 PROCEDURE — 97110 THERAPEUTIC EXERCISES: CPT | Performed by: PHYSICAL THERAPIST

## 2021-06-22 NOTE — PROGRESS NOTES
Physical Therapy Daily Progress Note    Patient: Lynda Rueda   : 1960  Diagnosis/ICD-10 Code:  Orthopedic aftercare [Z47.89]  Referring practitioner: Juan Pablo Major MD  Date of Initial Visit: Type: THERAPY  Noted: 2020  Today's Date: 2021  Patient seen for 41 sessions           Subjective shoulder is sore.  That IR stretch is tough    Objective   See Exercise, Manual, and Modality Logs for complete treatment.       Assessment/Plan  PT emphasis on PROM/ AAROM in flexion/ abduction/ ER/ IR.  Mild cuign with IR stretch.  MMT (R) shoulder 4/5 at 90 degrees. Strong emphasis on IR stretching         Timed:    Manual Therapy:        mins  70229;  Therapeutic Exercise:      25   mins  53439;     Neuromuscular Erica:        mins  21582;    Therapeutic Activity:    8     mins  53001;     Gait Training:           mins  24843;     Ultrasound:          mins  46273;    Electrical Stimulation:         mins  91047 ( );  Iontophoresis         mins 27940;  Aquatic Therapy         mins 32877;  Dry Needling                   mins    Untimed:  Electrical Stimulation:         mins  97665 ( );  Mechanical Traction:         mins  60457;     Timed Treatment:    33  mins   Total Treatment:    35    mins  oJe Carey, PT  Physical Therapist

## 2021-06-25 ENCOUNTER — TREATMENT (OUTPATIENT)
Dept: PHYSICAL THERAPY | Facility: CLINIC | Age: 61
End: 2021-06-25

## 2021-06-25 DIAGNOSIS — M25.511 ACUTE PAIN OF RIGHT SHOULDER: Primary | ICD-10-CM

## 2021-06-25 DIAGNOSIS — Z47.89 ORTHOPEDIC AFTERCARE: ICD-10-CM

## 2021-06-25 PROCEDURE — 97110 THERAPEUTIC EXERCISES: CPT | Performed by: PHYSICAL THERAPIST

## 2021-06-25 NOTE — PROGRESS NOTES
Physical Therapy Daily Progress Note    Patient: Lynda Rueda   : 1960  Diagnosis/ICD-10 Code:  Acute pain of right shoulder [M25.511]  Referring practitioner: Juan Pablo Major MD  Date of Initial Visit: Type: THERAPY  Noted: 2020  Today's Date: 2021  Patient seen for 42 sessions           Subjective I had to lift somethings a work, made my shoulder a little sore    Objective   See Exercise, Manual, and Modality Logs for complete treatment.       Assessment/Plan  PT emphasis on (R) shoulder PROM/ AAROM/ AROM specific to IR per pt tolerance.  Mild cuing with IR stretching.          Timed:    Manual Therapy:       mins  48191;  Therapeutic Exercise:    40    mins  94238;     Neuromuscular Erica:        mins  51387;    Therapeutic Activity:          mins  88502;     Gait Training:           mins  69809;     Ultrasound:         mins  86577;    Electrical Stimulation:         mins  97879 ( );  Iontophoresis         mins 90559;  Aquatic Therapy         mins 84666;  Dry Needling                   mins    Untimed:  Electrical Stimulation:         mins  12446 ( );  Mechanical Traction:         mins  34129;     Timed Treatment:   40   mins   Total Treatment:    40   mins  Joe Carey, PT  Physical Therapist

## 2021-06-29 ENCOUNTER — TREATMENT (OUTPATIENT)
Dept: PHYSICAL THERAPY | Facility: CLINIC | Age: 61
End: 2021-06-29

## 2021-06-29 DIAGNOSIS — M25.511 ACUTE PAIN OF RIGHT SHOULDER: ICD-10-CM

## 2021-06-29 DIAGNOSIS — Z47.89 ORTHOPEDIC AFTERCARE: Primary | ICD-10-CM

## 2021-06-29 PROCEDURE — 97530 THERAPEUTIC ACTIVITIES: CPT | Performed by: PHYSICAL THERAPIST

## 2021-06-29 PROCEDURE — 97110 THERAPEUTIC EXERCISES: CPT | Performed by: PHYSICAL THERAPIST

## 2021-06-29 NOTE — PROGRESS NOTES
Physical Therapy Daily Progress Note    Patient: Lynda Rueda   : 1960  Diagnosis/ICD-10 Code:  Orthopedic aftercare [Z47.89]  Referring practitioner: Juan Pablo Major MD  Date of Initial Visit: Type: THERAPY  Noted: 2020  Today's Date: 2021  Patient seen for 43 sessions           Subjective my IR seems better.     Objective   See Exercise, Manual, and Modality Logs for complete treatment.       Assessment/Plan    Improving IR L3 level.  Progressed with IR PROM AAROM AROM per pt tolerance. rC-scapular stabilization at 90 degrees 4/5; no night pain. All stg met. Follows up with ortho in two week.s       Timed:    Manual Therapy:         mins  28432;  Therapeutic Exercise:  25       mins  21873;     Neuromuscular Erica:        mins  65560;    Therapeutic Activity:   15       mins  32861;     Gait Training:           mins  05852;     Ultrasound:         mins  34459;    Electrical Stimulation:         mins  90491 ( );  Iontophoresis         mins 74892;  Aquatic Therapy         mins 79789;  Dry Needling                   mins    Untimed:  Electrical Stimulation:         mins  76262 ( );  Mechanical Traction:         mins  96980;     Timed Treatment:   40   mins   Total Treatment:     40   mins  Joe Carey, PT  Physical Therapist

## 2021-07-02 ENCOUNTER — TREATMENT (OUTPATIENT)
Dept: PHYSICAL THERAPY | Facility: CLINIC | Age: 61
End: 2021-07-02

## 2021-07-02 DIAGNOSIS — Z47.89 ORTHOPEDIC AFTERCARE: ICD-10-CM

## 2021-07-02 DIAGNOSIS — M25.511 ACUTE PAIN OF RIGHT SHOULDER: Primary | ICD-10-CM

## 2021-07-02 PROCEDURE — 97110 THERAPEUTIC EXERCISES: CPT | Performed by: PHYSICAL THERAPIST
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
## 2021-07-06 ENCOUNTER — TREATMENT (OUTPATIENT)
Dept: PHYSICAL THERAPY | Facility: CLINIC | Age: 61
End: 2021-07-06

## 2021-07-06 DIAGNOSIS — M25.511 ACUTE PAIN OF RIGHT SHOULDER: ICD-10-CM

## 2021-07-06 DIAGNOSIS — Z47.89 ORTHOPEDIC AFTERCARE: Primary | ICD-10-CM

## 2021-07-06 PROCEDURE — 97530 THERAPEUTIC ACTIVITIES: CPT | Performed by: PHYSICAL THERAPIST

## 2021-07-06 PROCEDURE — 97110 THERAPEUTIC EXERCISES: CPT | Performed by: PHYSICAL THERAPIST

## 2021-07-06 NOTE — PROGRESS NOTES
Physical Therapy Daily Progress Note    Patient: Lynda Rueda   : 1960  Diagnosis/ICD-10 Code:  Orthopedic aftercare [Z47.89]  Referring practitioner: Juan Pablo Major MD  Date of Initial Visit: Type: THERAPY  Noted: 2020  Today's Date: 2021  Patient seen for 45 sessions           Subjective working on my IR. Seems to be improving.     Objective   See Exercise, Manual, and Modality Logs for complete treatment.       Assessment/Plan  Progressed with shoulder AAROM in IR. Improving IR L4 level ;  Implemented shoulder stabilization below 90 degrees.          Timed:    Manual Therapy:        mins  57505;  Therapeutic Exercise:     25   mins  38074;     Neuromuscular Erica:        mins  58986;    Therapeutic Activity:     15     mins  13839;     Gait Training:           mins  48643;     Ultrasound:          mins  38496;    Electrical Stimulation:         mins  71485 ( );  Iontophoresis         mins 73730;  Aquatic Therapy         mins 84128;  Dry Needling                   mins    Untimed:  Electrical Stimulation:         mins  29338 ( );  Mechanical Traction:         mins  30769;     Timed Treatment:   40   mins   Total Treatment:        40mins  Joe Carey, PT  Physical Therapist

## 2021-07-09 ENCOUNTER — TREATMENT (OUTPATIENT)
Dept: PHYSICAL THERAPY | Facility: CLINIC | Age: 61
End: 2021-07-09

## 2021-07-09 DIAGNOSIS — M25.511 ACUTE PAIN OF RIGHT SHOULDER: Primary | ICD-10-CM

## 2021-07-09 DIAGNOSIS — G89.29 CHRONIC PAIN OF LEFT KNEE: ICD-10-CM

## 2021-07-09 DIAGNOSIS — M25.562 CHRONIC PAIN OF LEFT KNEE: ICD-10-CM

## 2021-07-09 DIAGNOSIS — Z47.89 ORTHOPEDIC AFTERCARE: ICD-10-CM

## 2021-07-09 PROCEDURE — 97110 THERAPEUTIC EXERCISES: CPT | Performed by: PHYSICAL THERAPIST

## 2021-07-09 NOTE — PROGRESS NOTES
Physical Therapy Daily Progress Note    Patient: Lynda Rueda   : 1960  Diagnosis/ICD-10 Code:  Acute pain of right shoulder [M25.511]  Referring practitioner: Juan Pablo Major MD  Date of Initial Visit: Type: THERAPY  Noted: 2020  Today's Date: 2021  Patient seen for 46 sessions           Subjective shoulder is stiff today no sure why    Objective   See Exercise, Manual, and Modality Logs for complete treatment.       Assessment/Plan  Progressed with (R) shoulder PROM/ AAROM/ AROM;  Improving IR to L5 region.  Marked improvement .  Symptoms of stiffness resolved with TE. All stg met. progresing towards all goals. (R) rc-scapular stabilization 4+/5 at 90 degrees.          Timed:    Manual Therapy:         mins  87880;  Therapeutic Exercise:      40   mins  29280;     Neuromuscular Erica:        mins  21026;    Therapeutic Activity:          mins  09001;     Gait Training:           mins  26338;     Ultrasound:          mins  53096;    Electrical Stimulation:         mins  28974 ( );  Iontophoresis         mins 32700;  Aquatic Therapy         mins 18172;  Dry Needling                   mins    Untimed:  Electrical Stimulation:         mins  14653 (MC );  Mechanical Traction:         mins  45820;     Timed Treatment:  40    mins   Total Treatment:     40   mins  Joe Carey, PT  Physical Therapist

## 2021-07-12 ENCOUNTER — TREATMENT (OUTPATIENT)
Dept: PHYSICAL THERAPY | Facility: CLINIC | Age: 61
End: 2021-07-12

## 2021-07-12 DIAGNOSIS — M25.511 ACUTE PAIN OF RIGHT SHOULDER: ICD-10-CM

## 2021-07-12 DIAGNOSIS — G89.29 CHRONIC PAIN OF LEFT KNEE: ICD-10-CM

## 2021-07-12 DIAGNOSIS — M25.562 CHRONIC PAIN OF LEFT KNEE: ICD-10-CM

## 2021-07-12 DIAGNOSIS — Z47.89 ORTHOPEDIC AFTERCARE: Primary | ICD-10-CM

## 2021-07-12 PROCEDURE — 97110 THERAPEUTIC EXERCISES: CPT | Performed by: PHYSICAL THERAPIST

## 2021-07-12 PROCEDURE — 97530 THERAPEUTIC ACTIVITIES: CPT | Performed by: PHYSICAL THERAPIST

## 2021-07-12 NOTE — PROGRESS NOTES
Physical Therapy Daily Progress Note    Patient: Lynda Rueda   : 1960  Diagnosis/ICD-10 Code:  Orthopedic aftercare [Z47.89]  Referring practitioner: Juan Pablo Major MD  Date of Initial Visit: Type: THERAPY  Noted: 2020  Today's Date: 2021  Patient seen for 47 sessions           Subjective   Shoulder doing better.  IR improving.   Objective   See Exercise, Manual, and Modality Logs for complete treatment.       Assessment/Plan    Progressed with IR AAROM/ AROM; implemented RC-scapular stabilization per tolerance. Mild cuing with HEP.        Timed:    Manual Therapy:        mins  66200;  Therapeutic Exercise:    30     mins  46992;     Neuromuscular Erica:        mins  47358;    Therapeutic Activity:      10     mins  91683;     Ultrasound:          mins  80600;    Electrical Stimulation:         mins  71507 ( );  Iontophoresis         mins 35512;  Aquatic Therapy         mins 44344;  Dry Needling                   mins    Untimed:  Electrical Stimulation:         mins  72867 ( );  Mechanical Traction:         mins  08911;     Timed Treatment: 40     mins   Total Treatment:      40  mins  Joe Carey, PT  Physical Therapist

## 2021-07-14 ENCOUNTER — OFFICE VISIT (OUTPATIENT)
Dept: ORTHOPEDIC SURGERY | Facility: CLINIC | Age: 61
End: 2021-07-14

## 2021-07-14 VITALS — HEIGHT: 60 IN | BODY MASS INDEX: 38.28 KG/M2 | WEIGHT: 195 LBS | TEMPERATURE: 97.2 F

## 2021-07-14 DIAGNOSIS — Z09 SURGERY FOLLOW-UP: Primary | ICD-10-CM

## 2021-07-14 PROCEDURE — 99212 OFFICE O/P EST SF 10 MIN: CPT | Performed by: ORTHOPAEDIC SURGERY

## 2021-07-14 NOTE — PROGRESS NOTES
CC:  5 months s/p right RCR    Lynda is now 5 months out from surgery.  She says that she feels like the shoulder is a little better than when I saw her last.  She says her pain is improved relative to last visit and she now rates her pain as roughly similar to what she was having before surgery.  She says her motion is better.  Therapy seems to be helping.  She has been compliant with the therapy.    Her surgical incisions are healed.  Her motion is improved but still not full.  Forward elevation is about 160 degrees.  She lacks 10 degrees of horizontal abduction.  External rotation is about 55 degrees.  Internal rotation is most limited, roughly to L2 or so.  No pain with active motion.  She still has some weakness with elevation in the scapular plane and external rotation but no significant pain with resistive testing of her rotator cuff.    Assessment: Adhesive capsulitis now 5-month status post right rotator cuff repair    Plan: We discussed further work-up with an MRI to evaluate healing of the tear and then a possible manipulation.  Initially, she wanted to get the MRI but the more we discussed that she decided that she would like to give the therapy a bit more time.  Plan at this point is to have her continue the therapy and then follow-up with me in about 6 weeks.  If she is no better at that time then we may reconsider the option of a manipulation for her.    Juan Pablo Major MD

## 2021-07-16 ENCOUNTER — TREATMENT (OUTPATIENT)
Dept: PHYSICAL THERAPY | Facility: CLINIC | Age: 61
End: 2021-07-16

## 2021-07-16 DIAGNOSIS — Z47.89 ORTHOPEDIC AFTERCARE: Primary | ICD-10-CM

## 2021-07-16 DIAGNOSIS — M25.511 ACUTE PAIN OF RIGHT SHOULDER: ICD-10-CM

## 2021-07-16 DIAGNOSIS — M25.562 CHRONIC PAIN OF LEFT KNEE: ICD-10-CM

## 2021-07-16 DIAGNOSIS — G89.29 CHRONIC PAIN OF LEFT KNEE: ICD-10-CM

## 2021-07-16 PROCEDURE — 97110 THERAPEUTIC EXERCISES: CPT | Performed by: PHYSICAL THERAPIST

## 2021-07-19 ENCOUNTER — TREATMENT (OUTPATIENT)
Dept: PHYSICAL THERAPY | Facility: CLINIC | Age: 61
End: 2021-07-19

## 2021-07-19 DIAGNOSIS — G89.29 CHRONIC PAIN OF LEFT KNEE: ICD-10-CM

## 2021-07-19 DIAGNOSIS — M25.511 ACUTE PAIN OF RIGHT SHOULDER: ICD-10-CM

## 2021-07-19 DIAGNOSIS — Z47.89 ORTHOPEDIC AFTERCARE: Primary | ICD-10-CM

## 2021-07-19 DIAGNOSIS — M25.562 CHRONIC PAIN OF LEFT KNEE: ICD-10-CM

## 2021-07-19 PROCEDURE — 97110 THERAPEUTIC EXERCISES: CPT | Performed by: PHYSICAL THERAPIST

## 2021-07-19 NOTE — PROGRESS NOTES
Physical Therapy Daily Progress Note    Patient: Lynda Rueda   : 1960  Diagnosis/ICD-10 Code:  Orthopedic aftercare [Z47.89]  Referring practitioner: Juan Pablo Major MD  Date of Initial Visit: Type: THERAPY  Noted: 2020  Today's Date: 2021  Patient seen for 49 sessions           Subjective doing my part    Objective   See Exercise, Manual, and Modality Logs for complete treatment.       Assessment/Plan  Strong emphasis on PROM / AAROM IR this session. Improved (R) IR T9.       Timed:    Manual Therapy:        mins  23389;  Therapeutic Exercise:   40      mins  96390;     Neuromuscular Erica:        mins  16177;    Therapeutic Activity:          mins  36999;     Gait Training:           mins  74116;     Ultrasound:          mins  95475;    Electrical Stimulation:         mins  28435 ( );  Iontophoresis         mins 88901;  Aquatic Therapy         mins 95212;  Dry Needling                   mins    Untimed:  Electrical Stimulation:         mins  88059 ( );  Mechanical Traction:         mins  57134;     Timed Treatment:  40    mins   Total Treatment:     40   mins  Joe Carey, PT  Physical Therapist

## 2021-07-19 NOTE — PROGRESS NOTES
30-Day / 10-Visit Progress Note         Patient: Lynda Rueda   : 1960  Diagnosis/ICD-10 Code:  Orthopedic aftercare [Z47.89]  Referring practitioner: No ref. provider found  Date of Initial Visit: Type: THERAPY  Noted: 2020  Today's Date: 2021  Patient seen for 48 sessions      Subjective:     Clinical Progress: improved  Home Program Compliance: Yes  Treatment has included:  therapeutic exercise    Subjective Evaluation    History of Present Illness    Subjective comment: we need to keep working on my IR. its improving but dr barber suggesting IR if not better in 6 wksQuality of life: good    Pain  Current pain ratin  At best pain ratin  At worst pain ratin  Quality: dull ache and tight         Objective     See Exercise, Manual, and Modality Logs for complete treatment.         Assessment & Plan     Assessment  Impairments: abnormal gait, abnormal or restricted ROM, impaired balance, impaired physical strength, pain with function and safety issue  Assessment details: Lynda Rueda is a 60 y.o. year-old female referred to physical therapy for s/p (R) RC repair with decompression. Pt currently is 6 month s/p. She presents with a stable clinical presentation.  She has no comorbidities  and no personal factors that may affect her progress in the plan of care. Pt reports increased use of (R) UE at work resulting in increased soreness/ stiffness. Pt reports 2/ 10 (R) shoulder pain. PT reviewed precautions/ restrictions per protocol. (R) shoulder PROM in flexion 167/ abduction 170;  AROM 1605 in flexion/ abduction 168;  MMT at this time is improving (R) shoulder 4/5 respectively at 90 degrees. IR 65 degrees. Improving IR toL2 level.  PT emphasis has been on IR primary these last 4 wks.  Pt notes improving independence with dressing/ grooming activities, notes difficulty with modified lifting tasks. Signs and symptoms are consistent with physical therapy diagnosis of (R) shoulder  pain/ orthopedic aftercare.  Prognosis: good  Functional Limitations: carrying objects, lifting, walking, pulling, uncomfortable because of pain, standing, stooping, reaching behind back, reaching overhead and unable to perform repetitive tasks    Prognosis: good  Functional Limitations: walking, uncomfortable because of pain, standing and stooping    Prognosis: good  Functional Limitations: walking, uncomfortable because of pain, standing and stooping    Prognosis: good  Functional Limitations: walking, uncomfortable because of pain, standing and stooping    Prognosis: good  Functional Limitations: walking, uncomfortable because of pain, standing and stooping  Goals  Plan Goals: Goals  Plan Goals: Goals  Plan Goals: stg 6 wks    Pt to be educated/ independent with precautions-restrictions met    Increased (R) shoulder PROM/ AAROM 170 degrees in flexion/ abduction to allow for increased ease with dressing/ grooming activities. ongoing    Pt to demonstrate consistently correct sitting posture met    Pt to be independent with donning / doffing (R) shoulder sling. met    Pt to demonstrate (R) shoulder flexion  AROM 170 degrees without compensation ongoing    ltg 12 wks.    Increase (R) shoulder AROM 170 degrees in flexion /abduction to allow for increased ease with dressing/ grooming/ bathing activities without evidence of compensation / symptoms of pain consistently.ongoing      Increased (R) RC scapular stabilization MMT to 4/5 at 90 degrees to allow for increased ease with modified lifting tasks.  ongoing    Increase (R) IR to 75 degrees/ T12 level to allow for increased ease with dressing/ grooming bathing activities.     Plan  Therapy options: will be seen for skilled physical therapy services  Planned modality interventions: cryotherapy, electrical stimulation/Russian stimulation, TENS, ultrasound and thermotherapy (hydrocollator packs)  Planned therapy interventions: abdominal trunk stabilization, manual  therapy, motor coordination training, neuromuscular re-education, balance/weight-bearing training, ADL retraining, flexibility, functional ROM exercises, gait training, home exercise program, joint mobilization, transfer training, therapeutic activities, strengthening, stretching and soft tissue mobilization  Frequency: 2x week  Duration in weeks: 12  Treatment plan discussed with: patient           Recommendations: Continue as planned  Timeframe: 1 month  Prognosis to achieve goals: good    PT Signature: Joe Carey, PT      Based upon review of the patient's progress and continued therapy plan, it is my medical opinion that Lynda Rueda should continue physical therapy treatment at [facility]  [address] [place] [address] [address] [place] [address]  [phone].    Signature: __________________________________      Timed:  Manual Therapy:         mins  38857;  Therapeutic Exercise:   45      mins  65701;     Neuromuscular Erica:        mins  05754;    Therapeutic Activity:          mins  89440;     Gait Training:           mins  26296;     Ultrasound:          mins  64423;    Electrical Stimulation:         mins  13023 ( );  Iontophoresis         mins 24054;  Dry Needling                   mins    Untimed:  Electrical Stimulation:         mins  62517 ( );  Mechanical Traction:        mins  72270;     Timed Treatment:      mins   Total Treatment:        mins

## 2021-07-21 ENCOUNTER — TREATMENT (OUTPATIENT)
Dept: PHYSICAL THERAPY | Facility: CLINIC | Age: 61
End: 2021-07-21

## 2021-07-21 DIAGNOSIS — M25.511 ACUTE PAIN OF RIGHT SHOULDER: ICD-10-CM

## 2021-07-21 DIAGNOSIS — Z47.89 ORTHOPEDIC AFTERCARE: Primary | ICD-10-CM

## 2021-07-21 PROCEDURE — 97110 THERAPEUTIC EXERCISES: CPT | Performed by: PHYSICAL THERAPIST

## 2021-07-21 NOTE — PROGRESS NOTES
Physical Therapy Daily Progress Note    Patient: Lynda Rueda   : 1960  Diagnosis/ICD-10 Code:  Orthopedic aftercare [Z47.89]  Referring practitioner: Juan Pablo Major MD  Date of Initial Visit: Type: THERAPY  Noted: 2020  Today's Date: 2021  Patient seen for 50 sessions           Subjective IR seems to be better .     Objective   See Exercise, Manual, and Modality Logs for complete treatment.       Assessment/Plan    Progressed with PROM/ AAROM per pt tolerance.  iimproved (R) IR t10 region       Timed:    Manual Therapy:        mins  90000;  Therapeutic Exercise:      40   mins  23152;     Neuromuscular Erica:        mins  50535;    Therapeutic Activity:         mins  18881;     Gait Training:           mins  81922;     Ultrasound:          mins  39511;    Electrical Stimulation:         mins  77401 ( );  Iontophoresis         mins 93931;  Aquatic Therapy         mins 49259;  Dry Needling                   mins    Untimed:  Electrical Stimulation:        mins  80413 ( );  Mechanical Traction:         mins  65781;     Timed Treatment:   40   mins   Total Treatment:    40 mins  Joe aCrey, PT  Physical Therapist

## 2021-07-23 ENCOUNTER — TREATMENT (OUTPATIENT)
Dept: PHYSICAL THERAPY | Facility: CLINIC | Age: 61
End: 2021-07-23

## 2021-07-23 DIAGNOSIS — Z47.89 ORTHOPEDIC AFTERCARE: Primary | ICD-10-CM

## 2021-07-23 DIAGNOSIS — M25.511 ACUTE PAIN OF RIGHT SHOULDER: ICD-10-CM

## 2021-07-23 PROCEDURE — 97110 THERAPEUTIC EXERCISES: CPT | Performed by: PHYSICAL THERAPIST

## 2021-07-23 NOTE — PROGRESS NOTES
Physical Therapy Daily Progress Note    Patient: Lynda Rueda   : 1960  Diagnosis/ICD-10 Code:  Orthopedic aftercare [Z47.89]  Referring practitioner: Juan Pablo Major MD  Date of Initial Visit: Type: THERAPY  Noted: 2020  Today's Date: 2021  Patient seen for 51 sessions           Subjective IR seems to be improving.    Objective   See Exercise, Manual, and Modality Logs for complete treatment.       Assessment/Plan    Improving IR t9 region. Compliant with Hep. Encouraged with progress in the last 2-3 weeks.        Timed:    Manual Therapy:        mins  86214;  Therapeutic Exercise:      35   mins  79839;     Neuromuscular Erica:        mins  63215;    Therapeutic Activity:          mins  63883;     Gait Training:           mins  57818;     Ultrasound:          mins  77910;    Electrical Stimulation:         mins  43228 ( );  Iontophoresis         mins 07554;  Aquatic Therapy         mins 46668;  Dry Needling                   mins    Untimed:  Electrical Stimulation:         mins  41590 (MC );  Mechanical Traction:         mins  57587;     Timed Treatment:   35 mins   Total Treatment:     35   mins  Joe Carey, PT  Physical Therapist

## 2021-07-26 ENCOUNTER — TREATMENT (OUTPATIENT)
Dept: PHYSICAL THERAPY | Facility: CLINIC | Age: 61
End: 2021-07-26

## 2021-07-26 DIAGNOSIS — M25.511 ACUTE PAIN OF RIGHT SHOULDER: ICD-10-CM

## 2021-07-26 DIAGNOSIS — Z47.89 ORTHOPEDIC AFTERCARE: Primary | ICD-10-CM

## 2021-07-26 PROCEDURE — 97110 THERAPEUTIC EXERCISES: CPT | Performed by: PHYSICAL THERAPIST

## 2021-07-26 NOTE — PROGRESS NOTES
Physical Therapy Daily Progress Note    Patient: Lynda Rueda   : 1960  Diagnosis/ICD-10 Code:  Orthopedic aftercare [Z47.89]  Referring practitioner: Juan Pablo Major MD  Date of Initial Visit: Type: THERAPY  Noted: 2020  Today's Date: 2021  Patient seen for 52 sessions           Subjective internal rotation is improving.       Objective   See Exercise, Manual, and Modality Logs for complete treatment.       Assessment/Plan  Improving IR T8.  MMT (R) rC at 90 4+/5  Compliant with HEP       Timed:    Manual Therapy:        mins  13513;  Therapeutic Exercise:    40     mins  08823;     Neuromuscular Erica:        mins  62945;    Therapeutic Activity:          mins  38485;     Gait Training:           mins  24254;     Ultrasound:          mins  01834;    Electrical Stimulation:         mins  16581 ( );  Iontophoresis         mins 68837;  Aquatic Therapy         mins 87667;  Dry Needling                   mins    Untimed:  Electrical Stimulation:         mins  68550 (MC );  Mechanical Traction:         mins  49693;     Timed Treatment: 40     mins   Total Treatment:     40   mins  Joe Carey, PT  Physical Therapist

## 2021-08-02 ENCOUNTER — TREATMENT (OUTPATIENT)
Dept: PHYSICAL THERAPY | Facility: CLINIC | Age: 61
End: 2021-08-02

## 2021-08-02 DIAGNOSIS — M25.511 ACUTE PAIN OF RIGHT SHOULDER: ICD-10-CM

## 2021-08-02 DIAGNOSIS — Z47.89 ORTHOPEDIC AFTERCARE: Primary | ICD-10-CM

## 2021-08-02 PROCEDURE — 97110 THERAPEUTIC EXERCISES: CPT | Performed by: PHYSICAL THERAPIST

## 2021-08-02 NOTE — PROGRESS NOTES
Physical Therapy Daily Progress Note    Patient: Lynda Rueda   : 1960  Diagnosis/ICD-10 Code:  Orthopedic aftercare [Z47.89]  Referring practitioner: Juan Pablo Major MD  Date of Initial Visit: Type: THERAPY  Noted: 2020  Today's Date: 2021  Patient seen for 53 sessions           Subjective IR is improving.  Very encouraged with my progress in the last 2 weeks.    Objective   See Exercise, Manual, and Modality Logs for complete treatment.       Assessment/Plan    IR to bra strap; t7 region (R) UE.  Compliant with HEP.  Progressing towards all goals possible candidate for discharge in the next 1-2 wks.       Timed:    Manual Therapy:         mins  93389;  Therapeutic Exercise:     35    mins  74294;     Neuromuscular Erica:        mins  25561;    Therapeutic Activity:         mins  31655;     Gait Training:           mins  84680;     Ultrasound:          mins  19323;    Electrical Stimulation:         mins  27379 ( );  Iontophoresis         mins 76062;  Aquatic Therapy         mins 09239;  Dry Needling                   mins    Untimed:  Electrical Stimulation:         mins  09671 (MC );  Mechanical Traction:         mins  62674;     Timed Treatment:  35    mins   Total Treatment:   35     mins  Joe Carey, PT  Physical Therapist

## 2021-08-04 ENCOUNTER — TREATMENT (OUTPATIENT)
Dept: PHYSICAL THERAPY | Facility: CLINIC | Age: 61
End: 2021-08-04

## 2021-08-04 DIAGNOSIS — Z47.89 ORTHOPEDIC AFTERCARE: Primary | ICD-10-CM

## 2021-08-04 DIAGNOSIS — M25.511 ACUTE PAIN OF RIGHT SHOULDER: ICD-10-CM

## 2021-08-04 DIAGNOSIS — M25.562 CHRONIC PAIN OF LEFT KNEE: ICD-10-CM

## 2021-08-04 DIAGNOSIS — G89.29 CHRONIC PAIN OF LEFT KNEE: ICD-10-CM

## 2021-08-04 PROCEDURE — 97110 THERAPEUTIC EXERCISES: CPT | Performed by: PHYSICAL THERAPIST

## 2021-08-04 NOTE — PROGRESS NOTES
Physical Therapy Daily Progress Note    Patient: Lynda Rueda   : 1960  Diagnosis/ICD-10 Code:  Orthopedic aftercare [Z47.89]  Referring practitioner: Juan Pablo Major MD  Date of Initial Visit: Type: THERAPY  Noted: 2020  Today's Date: 2021  Patient seen for 54 sessions           Subjective doing my part . IR is improving    Objective   See Exercise, Manual, and Modality Logs for complete treatment.       Assessment/Plan    Progressed with IR / post capsular stretching per pt tolerance.  Flexion 175/ abduction 175 PROM; IR T8 level.       Timed:    Manual Therapy:      mins  98026;  Therapeutic Exercise:    40     mins  37877;     Neuromuscular Erica:        mins  09521;    Therapeutic Activity:          mins  23696;     Gait Training:           mins  66652;     Ultrasound:          mins  46444;    Electrical Stimulation:         mins  65497 ( );  Iontophoresis         mins 80206;  Aquatic Therapy         mins 42607;  Dry Needling                   mins    Untimed:  Electrical Stimulation:         mins  58817 ( );  Mechanical Traction:         mins  99233;     Timed Treatment:    40  mins   Total Treatment:     40   mins  Joe Carey, PT  Physical Therapist

## 2021-08-12 ENCOUNTER — TREATMENT (OUTPATIENT)
Dept: PHYSICAL THERAPY | Facility: CLINIC | Age: 61
End: 2021-08-12

## 2021-08-12 DIAGNOSIS — Z47.89 ORTHOPEDIC AFTERCARE: Primary | ICD-10-CM

## 2021-08-12 DIAGNOSIS — M25.511 ACUTE PAIN OF RIGHT SHOULDER: ICD-10-CM

## 2021-08-12 PROCEDURE — 97110 THERAPEUTIC EXERCISES: CPT | Performed by: PHYSICAL THERAPIST

## 2021-08-12 NOTE — PROGRESS NOTES
Physical Therapy Daily Progress Note    Patient: Lynda Rueda   : 1960  Diagnosis/ICD-10 Code:  Orthopedic aftercare [Z47.89]  Referring practitioner: Juan Pablo Major MD  Date of Initial Visit: Type: THERAPY  Noted: 2020  Today's Date: 2021  Patient seen for 55 sessions           Subjective my internal rotation is getting better. Compliant with HEP    Objective   See Exercise, Manual, and Modality Logs for complete treatment.       Assessment/Plan  IR at T7; mild cuing with HEP.  IR improving.  Follows up with ortho two weeks.         Timed:    Manual Therapy:        mins  94564;  Therapeutic Exercise:    30   mins  60092;     Neuromuscular Erica:        mins  05215;    Therapeutic Activity:          mins  67349;     Gait Training:           mins  52453;     Ultrasound:          mins  30856;    Electrical Stimulation:         mins  00155 ( );  Iontophoresis         mins 09784;  Aquatic Therapy         mins 71048;  Dry Needling                   mins    Untimed:  Electrical Stimulation:         mins  39825 ( );  Mechanical Traction:         mins  67989;     Timed Treatment:    30  mins   Total Treatment:    30   mins  Joe Carey, PT  Physical Therapist

## 2021-08-19 ENCOUNTER — TREATMENT (OUTPATIENT)
Dept: PHYSICAL THERAPY | Facility: CLINIC | Age: 61
End: 2021-08-19

## 2021-08-19 DIAGNOSIS — M25.562 CHRONIC PAIN OF LEFT KNEE: ICD-10-CM

## 2021-08-19 DIAGNOSIS — M25.511 ACUTE PAIN OF RIGHT SHOULDER: ICD-10-CM

## 2021-08-19 DIAGNOSIS — G89.29 CHRONIC PAIN OF LEFT KNEE: ICD-10-CM

## 2021-08-19 DIAGNOSIS — Z47.89 ORTHOPEDIC AFTERCARE: Primary | ICD-10-CM

## 2021-08-19 PROCEDURE — 97110 THERAPEUTIC EXERCISES: CPT | Performed by: PHYSICAL THERAPIST

## 2021-08-20 NOTE — PROGRESS NOTES
30-Day / 10-Visit Progress Note         Patient: Lynda Rueda   : 1960  Diagnosis/ICD-10 Code:  Orthopedic aftercare [Z47.89]  Referring practitioner: Juan Pablo Major MD  Date of Initial Visit: Type: THERAPY  Noted: 2020  Today's Date: 2021  Patient seen for 56 sessions      Subjective:     Clinical Progress: improved  Home Program Compliance: Yes  Treatment has included:  therapeutic exercise    Subjective Evaluation    History of Present Illness    Subjective comment: i feel like i am doing well. exercises have been tough but rupal shown progress.  function of arm is good.      Objective     See Exercise, Manual, and Modality Logs for complete treatment.         Assessment & Plan     Assessment  Impairments: abnormal gait, abnormal or restricted ROM, impaired balance, impaired physical strength, pain with function and safety issue  Assessment details: Lynda Rueda is a 60 y.o. year-old female referred to physical therapy for s/p (R) RC repair with decompression. Pt currently is 6 month s/p. She presents with a stable clinical presentation.  She has no comorbidities  and no personal factors that may affect her progress in the plan of care. Pt reports increased use of (R) UE at work resulting in increased soreness/ stiffness. Pt reports 0/ 10 (R) shoulder pain. PT reviewed precautions/ restrictions per protocol. (R) shoulder PROM in flexion 170/ abduction 174;  AROM  in flexion/ abduction 170.  Improved (R) IR T7 level PROM/ AAROM;  MMT at this time is improving (R) shoulder 4+/5 respectively at 90 degrees.  Improving IR toL2 level.  PT emphasis has been on IR primary these last 4 wks.  Pt notes improving independence with dressing/ grooming activities, notes difficulty with modified lifting tasks. Signs and symptoms are consistent with physical therapy diagnosis of (R) shoulder pain/ orthopedic aftercare.  Prognosis: good  Functional Limitations: carrying objects, lifting, walking,  pulling, uncomfortable because of pain, standing, stooping, reaching behind back, reaching overhead and unable to perform repetitive tasks    Prognosis: good  Functional Limitations: walking, uncomfortable because of pain, standing and stooping    Prognosis: good  Functional Limitations: walking, uncomfortable because of pain, standing and stooping    Prognosis: good  Functional Limitations: walking, uncomfortable because of pain, standing and stooping    Prognosis: good  Functional Limitations: walking, uncomfortable because of pain, standing and stooping  Goals  Plan Goals: Goals    Prognosis: good  Functional Limitations: walking, uncomfortable because of pain, standing and stooping  Goals  Plan Goals: Plan Goals: Goals  Plan Goals: stg 6 wks    Pt to be educated/ independent with precautions-restrictions met    Increased (R) shoulder PROM/ AAROM 170 degrees in flexion/ abduction to allow for increased ease with dressing/ grooming activities. met    Pt to demonstrate consistently correct sitting posture met    Pt to be independent with donning / doffing (R) shoulder sling. met    Pt to demonstrate (R) shoulder flexion  AROM 170 degrees without compensation met    ltg 12 wks.    Increase (R) shoulder AROM 170 degrees in flexion /abduction to allow for increased ease with dressing/ grooming/ bathing activities; increased (R) IR AAROM T7 leve all without evidence of compensation / symptoms of pain consistently.met      Increased (R) RC scapular stabilization MMT to 4/5 at 90 degrees to allow for increased ease with modified lifting tasks.  ongoing    Increase (R) IR to 75 degrees/ T12 level to allow for increased ease with dressing/ grooming bathing activities.       Plan  Therapy options: will be seen for skilled physical therapy services  Planned modality interventions: cryotherapy, electrical stimulation/Russian stimulation, TENS, ultrasound and thermotherapy (hydrocollator packs)  Planned therapy interventions:  abdominal trunk stabilization, manual therapy, motor coordination training, neuromuscular re-education, balance/weight-bearing training, ADL retraining, flexibility, functional ROM exercises, gait training, home exercise program, joint mobilization, transfer training, therapeutic activities, strengthening, stretching and soft tissue mobilization  Duration in weeks: 12  Treatment plan discussed with: patient           Recommendations: Continue as planned  Timeframe: 1 month  Prognosis to achieve goals: good    PT Signature: Joe Carey, PT      Based upon review of the patient's progress and continued therapy plan, it is my medical opinion that Lynda Rueda should continue physical therapy treatment at [facility]  [address] [place] [address] [address]   [place] [address]  [phone].    Signature: __________________________________  Juan Pablo Major MD    Timed:  Manual Therapy:         mins  47069;  Therapeutic Exercise:   40      mins  03638;     Neuromuscular Erica:        mins  62762;    Therapeutic Activity:          mins  26630;     Gait Training:           mins  18494;     Ultrasound:          mins  29238;    Electrical Stimulation:         mins  78832 ( );  Iontophoresis         mins 09174;  Dry Needling                   mins    Untimed:  Electrical Stimulation:         mins  08410 ( );  Mechanical Traction:         mins  41874;     Timed Treatment:    40  mins   Total Treatment:     40   mins

## 2021-08-25 ENCOUNTER — OFFICE VISIT (OUTPATIENT)
Dept: ORTHOPEDIC SURGERY | Facility: CLINIC | Age: 61
End: 2021-08-25

## 2021-08-25 VITALS — WEIGHT: 195 LBS | BODY MASS INDEX: 36.82 KG/M2 | TEMPERATURE: 96.9 F | HEIGHT: 61 IN

## 2021-08-25 DIAGNOSIS — Z09 SURGERY FOLLOW-UP: Primary | ICD-10-CM

## 2021-08-25 PROCEDURE — 99212 OFFICE O/P EST SF 10 MIN: CPT | Performed by: ORTHOPAEDIC SURGERY

## 2021-08-25 NOTE — PROGRESS NOTES
CC: Follow-up now 6-month status post right rotator cuff repair    Lynda follows up today for her right shoulder. It is doing great. She has really turned a corner. She says she has recovered near full motion. Denies any significant pain. She is happy with her outcome at this point.    Her surgical incisions are healed. Her motion is great. She has nearly symmetric motion to the contralateral side. She just lacks 1 or 2 levels of internal rotation. Her strength is better as well. She is still a little weak with elevation in the scapular plane but she does not have any pain.    Assessment: 6-month status post right rotator cuff tear    Plan: I am going to release her to follow-up as needed. She can resume activity as tolerated.

## 2022-03-03 ENCOUNTER — APPOINTMENT (OUTPATIENT)
Dept: WOMENS IMAGING | Facility: HOSPITAL | Age: 62
End: 2022-03-03

## 2022-03-03 PROCEDURE — 77067 SCR MAMMO BI INCL CAD: CPT | Performed by: RADIOLOGY

## 2022-03-03 PROCEDURE — 77063 BREAST TOMOSYNTHESIS BI: CPT | Performed by: RADIOLOGY

## 2022-03-30 ENCOUNTER — OFFICE VISIT (OUTPATIENT)
Dept: INTERNAL MEDICINE | Facility: CLINIC | Age: 62
End: 2022-03-30

## 2022-03-30 VITALS
HEIGHT: 61 IN | HEART RATE: 71 BPM | WEIGHT: 197 LBS | SYSTOLIC BLOOD PRESSURE: 118 MMHG | TEMPERATURE: 96.9 F | DIASTOLIC BLOOD PRESSURE: 60 MMHG | BODY MASS INDEX: 37.19 KG/M2 | OXYGEN SATURATION: 96 %

## 2022-03-30 DIAGNOSIS — E66.01 CLASS 2 SEVERE OBESITY DUE TO EXCESS CALORIES WITH SERIOUS COMORBIDITY AND BODY MASS INDEX (BMI) OF 37.0 TO 37.9 IN ADULT: ICD-10-CM

## 2022-03-30 DIAGNOSIS — E11.9 NON-INSULIN DEPENDENT TYPE 2 DIABETES MELLITUS: Primary | ICD-10-CM

## 2022-03-30 DIAGNOSIS — I10 BENIGN ESSENTIAL HYPERTENSION: ICD-10-CM

## 2022-03-30 DIAGNOSIS — F41.9 ANXIETY AND DEPRESSION: ICD-10-CM

## 2022-03-30 DIAGNOSIS — F32.A ANXIETY AND DEPRESSION: ICD-10-CM

## 2022-03-30 DIAGNOSIS — E03.9 ACQUIRED HYPOTHYROIDISM: ICD-10-CM

## 2022-03-30 PROCEDURE — 99204 OFFICE O/P NEW MOD 45 MIN: CPT | Performed by: STUDENT IN AN ORGANIZED HEALTH CARE EDUCATION/TRAINING PROGRAM

## 2022-03-30 RX ORDER — GLIPIZIDE 10 MG/1
10 TABLET ORAL EVERY MORNING
Qty: 30 TABLET | Refills: 1 | Status: SHIPPED
Start: 2022-03-30

## 2022-03-30 NOTE — PROGRESS NOTES
"  Shun Miranda D.O.  Internal Medicine  [facility]  [address]  [place] [address]  [phone]      Chief Complaint  Diabetes and Annual Exam    SUBJECTIVE    History of Present Illness    Lynda Rueda is a 61 y.o. female who presents to the office today as a new patient to establish care.   Previously saw Dr Avila with [facility].     Type 2 diabetes: goes to [facility]. She has a freestyle bran. Taking empagliflozin 25 mg daily, sitagliptin 100 mg daily, metformin ER 1000 mg twice daily, and glipizide 10 mg daily. Last A1c was 6.8.     HLD: taking rosuvastatin 20 mg daily and fenofibrate 160 mg daily     Anxiety and Depression: taking citalopram 40 mg daily, states she is \"very high strung\" and the citalopram \"keeps her in check\" and has definitiely helped \"It's been a life saver\".     Seasonal allergies: takes allegra as needed    Hypothyroidism: had a thyroidectomy and is now on levothyroxine 112 mcg daily     HTN: taking metoprolol succinate 50 mg daily; she doesn't check her BP at home.     Vitamin D deficiency: takes vitamin D 50,000 units weekly    Obesity: not currently exercising, states a lot of it is from less walking with COVID pandemic.     Allergies   Allergen Reactions   • Celecoxib Hives   • Methocarbamol Hives     ROBAXIN    • Montelukast Hives   • Penicillins Hives   • Carbomer Other (See Comments)     UNKNOWN REACTION   • [place] Other (See Comments)     UNKNOWN PER PT/POSITIVE ALLERGY TESTING   • Gemifloxacin Hives   • Grapefruit Itching   • Nickel Itching     WHEN PT TOOK THE ALLERGY TEST NICKEL, ACRYLIC AND COBALT CAME UP AS ALLERGIES   • Watermelon [Citrullus Vulgaris] Itching   • Tamiflu [Oseltamivir Phosphate] Rash        Outpatient Medications Marked as Taking for the 3/30/22 encounter (Office Visit) with Shun Miranda, DO   Medication Sig Dispense Refill   • citalopram (CeleXA) 40 MG tablet Take 40 mg by mouth Every Morning.     • Continuous " Blood Gluc Sensor (FreeStyle Mar 14 Day Sensor) misc APPLY 1 EACH TOPICALLY EVERY 14 (FOURTEEN) DAYS.     • empagliflozin (JARDIANCE) 25 MG tablet tablet Take 25 mg by mouth Every Morning.     • fenofibrate 160 MG tablet TAKE 1 TABLET BY MOUTH EVERY DAY (Patient taking differently: Take 160 mg by mouth Every Morning.) 90 tablet 1   • fexofenadine (ALLEGRA) 180 MG tablet Take 180 mg by mouth Every Night.     • glipizide (GLUCOTROL) 10 MG tablet Take 1 tablet by mouth Every Morning. 30 tablet 1   • glucose blood test strip OneTouch Ultra Blue In Vitro Strip; Patient Sig: OneTouch Ultra Blue In Vitro Strip ; 100; 0; 11-Nov-2013; Active     • levothyroxine (SYNTHROID, LEVOTHROID) 112 MCG tablet Take 112 mcg by mouth Every Morning. ONE TAB DAILY MON-SAT, 1/2 TAB Sunday/BRAND NAME     • metFORMIN ER (GLUCOPHAGE-XR) 500 MG 24 hr tablet Take 1,000 mg by mouth 2 (Two) Times a Day.     • metoprolol succinate XL (TOPROL-XL) 50 MG 24 hr tablet TAKE 1 TABLET BY MOUTH EVERY DAY**NEEDS TO MAKE APPT** (Patient taking differently: Take 50 mg by mouth Every Morning.) 90 tablet 0   • rosuvastatin (CRESTOR) 20 MG tablet TAKE 1 TABLET BY MOUTH EVERY DAY (Patient taking differently: Take 20 mg by mouth Every Morning.) 30 tablet 0   • SITagliptin (JANUVIA) 100 MG tablet Take 100 mg by mouth Every Morning.     • triamcinolone (KENALOG) 0.1 % cream APPLY BY TOPICAL ROUTE 2 TIMES A DAY TO ITCHY SPOTS     • vitamin D (ERGOCALCIFEROL) 32639 UNITS capsule capsule Take 50,000 Units by mouth Every 7 (Seven) Days. MONDAYS     • [DISCONTINUED] glipiZIDE (GLUCOTROL) 10 MG tablet Take 1 tablet (10 mg total) by mouth 2 (two) times a day. (Patient taking differently: Take 10 mg by mouth Every Morning.) 180 tablet 2        Past Medical History:   Diagnosis Date   • Anxiety    • Arthritis    • Cataract     bilateral   • GERD (gastroesophageal reflux disease)    • H/O colonoscopy 08/2012   • History of kidney stones    • History of migraine    •  "Hyperlipidemia    • Hypertension    • Low back pain    • PONV (postoperative nausea and vomiting)    • Rotator cuff injury 2020    RIGHT   • Thyroid disease     now hypothyroidism s/p thyroidectomy   • Type 2 diabetes mellitus with hyperglycemia, without long-term current use of insulin (HCC)      Past Surgical History:   Procedure Laterality Date   • APPENDECTOMY     • BREAST BIOPSY Right     BENIGN   •  SECTION     • CHOLECYSTECTOMY     • COLONOSCOPY N/A 06/15/2018    Procedure: COLONOSCOPY INTO CECUM AND T.I. WITH BIOPSIES AND COLD BIOPSY POLYPECTOMY;  Surgeon: Sunil Cardona MD;  Location: [facility];  Service: Gastroenterology   • DILATATION AND CURETTAGE     • HYSTERECTOMY  2007    OVARIAN MASS/SMALL INTESTINE   • KNEE SURGERY Left 2013    meniscal surgery   • OVARIAN CYST REMOVAL     • RHINOPLASTY     • SHOULDER ARTHROSCOPY W/ ROTATOR CUFF REPAIR Right 2021    Procedure: SHOULDER ARTHROSCOPY WITH ROTATOR CUFF REPAIR, subacromial decompression;  Surgeon: Juan Pablo Major MD;  Location: [facility] OR [facility];  Service: Orthopedics;  Laterality: Right;   • THYROIDECTOMY  2016     Family History   Problem Relation Age of Onset   • COPD Mother    • Hyperlipidemia Mother    • Hypertension Mother    • Melanoma Mother    • Heart failure Mother    • Transient ischemic attack Mother    • Colon cancer Father 80   • Thyroid disease Sister    • Heart attack Brother 63   • Malig Hyperthermia Neg Hx     reports that she has never smoked. She has never used smokeless tobacco. She reports current alcohol use. She reports that she does not use drugs.    OBJECTIVE    Vital Signs:   /60   Pulse 71   Temp 96.9 °F (36.1 °C)   Ht 154.9 cm (61\")   Wt 89.4 kg (197 lb)   SpO2 96%   BMI 37.22 kg/m²     Physical Exam  Vitals reviewed.   Constitutional:       General: She is not in acute distress.     Appearance: Normal appearance. She is obese. She is not ill-appearing.   HENT:      Head: " Atraumatic.   Eyes:      General: No scleral icterus.  Cardiovascular:      Rate and Rhythm: Normal rate and regular rhythm.      Heart sounds: Normal heart sounds. No murmur heard.  Pulmonary:      Effort: Pulmonary effort is normal. No respiratory distress.      Breath sounds: Normal breath sounds. No wheezing or rhonchi.   Musculoskeletal:      Right lower leg: No edema.      Left lower leg: No edema.   Neurological:      Mental Status: She is alert.   Psychiatric:         Mood and Affect: Mood normal.         Behavior: Behavior normal.         Thought Content: Thought content normal.            The following data was reviewed by: Shun Miranda DO on 03/30/2022:  Common labs    Common Labsle 11/9/21 2/19/22 2/22/22 2/22/22 2/22/22      0810 0810 0810   Glucose 94       BUN 13  12     Creatinine 0.7   0.71    Sodium 140       Potassium 4.8       Chloride 104       Calcium 9.5       Albumin 4.4    4.3   Total Bilirubin 0.4    0.3   Alkaline Phosphatase 47    41   AST (SGOT) <5 (A)    24   ALT (SGPT) <5 (A)    13   WBC  11.06 (A)      Hemoglobin  14.4      Hematocrit  44.4      Platelets  420      (A) Abnormal value                         ASSESSMENT & PLAN     Diagnoses and all orders for this visit:    1. Non-insulin dependent type 2 diabetes mellitus (HCC) (Primary)  -goes to [facility]. She has a freestyle bran. Taking empagliflozin 25 mg daily, sitagliptin 100 mg daily, metformin ER 1000 mg twice daily, and glipizide 10 mg daily. Last A1c was 6.8 2/2022, overall good control. Reports previously being unable to tolerate GLP1 agonist.   -I will obtain copy of her diabetic eye exam  -diabetic foot exam at next visit    2. Acquired hypothyroidism  -s/p thyroidectomy  -last TSH WNL 2/2022  -continue levothyroxine 112 mcg daily    3. Benign essential hypertension  -on metoprolol succinate 50 mg daily  -BP well controlled at 118/60 today  -continue current regimen    4. Anxiety and depression  - taking  citalopram 40 mg daily  -reports great control on this regimen, continue    5. Obesity  -Patient is not currently exercising. Patient is not not counting calories or following a specific diet plan.  -Current BMI and weight: BMI 37.2, weight 197 lbs  -Recommended to patient: exercise a minimum of 150 minutes per week at a brisk walk or more; calorie counting (many successful apps vs tracking on paper); structured diet programs such as Weight Watchers or [employer] for those who prefer; behavioral therapy such as [facility] or in person cognitive behavioral therapy; avoid OTC dietary supplements as there is limited evidence and safety data; consider OTC All? (orlistat) 60 mg daily, discussed contraindications, common side effects and usage instructions.   -Provided patient their recommended calorie intake with various levels of exercise to lose 1-2 pounds per week  -Continue to monitor every 2-3 months for progress and barriers. Consider prescription weight loss medication if no improvement.          The following social determinates of health impact the patient's medical decision making: No social determinates of health were factored in to today's visit.     Follow Up  Return in about 7 weeks (around 5/18/2022) for Annual physical.    Patient/family had no further questions at this time and verbalized understanding of the plan discussed today.

## 2022-03-31 ENCOUNTER — PATIENT ROUNDING (BHMG ONLY) (OUTPATIENT)
Dept: INTERNAL MEDICINE | Facility: CLINIC | Age: 62
End: 2022-03-31

## 2022-03-31 NOTE — PROGRESS NOTES
March 31, 2022             JUANI PENNY [facility]  [address] [address] [phone].        Information collected during check out.      Overall were you satisfied with your first visit to our practice? Yes       I appreciate you taking the time to speak with me today. Is there anything else I can do for you? No      Thank you, and have a great day.

## 2022-05-19 ENCOUNTER — OFFICE VISIT (OUTPATIENT)
Dept: INTERNAL MEDICINE | Facility: CLINIC | Age: 62
End: 2022-05-19

## 2022-05-19 VITALS
DIASTOLIC BLOOD PRESSURE: 70 MMHG | HEIGHT: 61 IN | BODY MASS INDEX: 37.38 KG/M2 | TEMPERATURE: 96.9 F | WEIGHT: 198 LBS | OXYGEN SATURATION: 95 % | HEART RATE: 75 BPM | SYSTOLIC BLOOD PRESSURE: 110 MMHG

## 2022-05-19 DIAGNOSIS — Z00.00 ANNUAL PHYSICAL EXAM: Primary | ICD-10-CM

## 2022-05-19 PROCEDURE — 99396 PREV VISIT EST AGE 40-64: CPT | Performed by: STUDENT IN AN ORGANIZED HEALTH CARE EDUCATION/TRAINING PROGRAM

## 2022-05-19 NOTE — PROGRESS NOTES
Shun Miranda D.O.  Internal Medicine  [facility]  [address]  [place] [address]  [phone]    Chief Complaint  Annual checkup    HISTORY    Lynda Rueda is a 61 y.o. female who presents to the office today as a  an established patient for their annual preventative exam.    No hospitalization(s) within the last year.     New medical conditions/surgeries/providers since last visit:     Status of chronic medical conditions:    Type 2 diabetes: goes to [facility]. She has a freestyle bran. Taking empagliflozin 25 mg daily, sitagliptin 100 mg daily, metformin ER 1000 mg twice daily, and glipizide 10 mg daily.     HLD: taking rosuvastatin 20 mg daily and fenofibrate 160 mg daily      Anxiety and Depression: taking citalopram 40 mg daily     Seasonal allergies: takes allegra as needed     Hypothyroidism: had a thyroidectomy and is now on levothyroxine 112 mcg daily      HTN: taking metoprolol succinate 50 mg daily; she doesn't check her BP at home.      Vitamin D deficiency: takes vitamin D 50,000 units weekly    Patient's overall comments about their health or any other specific health concerns they report:          Health Maintenance Summary          Overdue - COVID-19 Vaccine (1) Overdue since 11/4/1965    10/26/2021  Imm Admin: COVID-19 (MODERNA) BOOSTER    03/01/2021  Imm Admin: COVID-19 (MODERNA) BOOSTER    02/01/2021  Imm Admin: COVID-19 (MODERNA) BOOSTER          Overdue - MAMMOGRAM (Every 2 Years) Overdue since 2/4/2018 02/04/2016  Reason not specified          Overdue - PAP SMEAR (Every 3 Years) Overdue since 2/4/2019 02/04/2016  Reason not specified          Overdue - DIABETIC FOOT EXAM (Yearly) Overdue since 7/18/2019 07/18/2018  SmartData: WORKFLOW - DIABETES - DIABETIC FOOT EXAM PERFORMED    07/18/2018  SmartData: FINDINGS - TESTS - NEUROLOGY - MONOFILAMENT TEST - MONOFILAMENT TEST PERFORMED    07/05/2018  Done    07/05/2018  SmartData:  WORKFLOW - DIABETES - DIABETIC FOOT EXAM PERFORMED    10/04/2016  SmartData: WORKFLOW - DIABETES - DIABETIC FOOT EXAM PERFORMED    Only the first 5 history entries have been loaded, but more history exists.          Overdue - ZOSTER VACCINE (2 of 2) Overdue since 12/1/2020    10/06/2020  Imm Admin: Shingrix          INFLUENZA VACCINE (Yearly - August to March) Next due on 8/1/2022    10/10/2021  Imm Admin: Flu Vaccine Split Quad    10/10/2021  Imm Admin: Influenza, Unspecified    10/04/2020  Imm Admin: Flucelvax Quad Vial =>4yrs    10/04/2019  Imm Admin: FluLaval/Fluarix/Fluzone >6    12/07/2018  Done    Only the first 5 history entries have been loaded, but more history exists.          HEMOGLOBIN A1C (Every 6 Months) Next due on 8/22/2022 02/22/2022  POCT GLYCOSYLATED HEMOGLOBIN (HGB A1C)    11/09/2021  POCT GLYCOSYLATED HEMOGLOBIN (HGB A1C)    03/31/2021  Hemoglobin A1C component of HEMOGLOBIN A1C    02/22/2021  Outside Procedure: CHG GLYCOSYLATED HEMOGLOBIN TEST    11/16/2020  Hemoglobin A1C component of HEMOGLOBIN A1C    Only the first 5 history entries have been loaded, but more history exists.          DIABETIC EYE EXAM (Yearly) Next due on 9/9/2022 09/09/2021  SCANNED - EYE EXAM    03/13/2019  SCANNED - EYE EXAM    02/14/2018  SCANNED - EYE EXAM    07/07/2016  Reason not specified          URINE MICROALBUMIN (Yearly) Next due on 11/9/2022 11/09/2021  MicroAlbumin, Urine, Random -    11/16/2020  MicroAlbumin, Urine, Random -    07/05/2019  MicroAlbumin, Urine, Random -    04/05/2019  MicroAlbumin, Urine, Random -    01/11/2019  Microalbumin, Urine component of MicroAlbumin, Urine, Random - Urine, Clean Catch    Only the first 5 history entries have been loaded, but more history exists.          LIPID PANEL (Yearly) Next due on 2/22/2023 02/22/2022  Outside Procedure: G LIPID PANEL    03/31/2021  LIPID PANEL    11/16/2020  LIPID PANEL    08/03/2020  LIPID PANEL    04/01/2020  LIPID PANEL    Only  the first 5 history entries have been loaded, but more history exists.          ANNUAL PHYSICAL (Yearly) Next due on 5/20/2023 05/19/2022  Done    07/05/2018  Registry Metric: Last Annual Physical          Pneumococcal Vaccine 0-64 (3 - PPSV23 or PCV20) Next due on 11/4/2025    10/04/2019  Imm Admin: Pneumococcal Polysaccharide (PPSV23)    11/11/2013  Imm Admin: Pneumococcal Conjugate 13-Valent (PCV13)    11/11/2013  Imm Admin: Pneumococcal, Unspecified          TDAP/TD VACCINES (2 - Td or Tdap) Next due on 11/24/2025 11/24/2015  Imm Admin: Tdap          COLORECTAL CANCER SCREENING (COLONOSCOPY - Every 10 Years) Next due on 6/15/2028    06/15/2018  COLONOSCOPY    06/15/2018  Surgical Procedure: COLONOSCOPY    05/05/2011  COLONOSCOPY (Reason not specified)          HEPATITIS C SCREENING  Completed    07/05/2019  HEPATITIS C ANTIBODY    10/04/2016  Declined                 Allergies   Allergen Reactions   • Celecoxib Hives   • Methocarbamol Hives     ROBAXIN    • Montelukast Hives   • Penicillins Hives   • Carbomer Other (See Comments)     UNKNOWN REACTION   • [place] Other (See Comments)     UNKNOWN PER PT/POSITIVE ALLERGY TESTING   • Gemifloxacin Hives   • Grapefruit Itching   • Nickel Itching     WHEN PT TOOK THE ALLERGY TEST NICKEL, ACRYLIC AND COBALT CAME UP AS ALLERGIES   • Watermelon [Citrullus Vulgaris] Itching   • Tamiflu [Oseltamivir Phosphate] Rash        Outpatient Medications Marked as Taking for the 5/19/22 encounter (Office Visit) with Shun Miranda DO   Medication Sig Dispense Refill   • citalopram (CeleXA) 40 MG tablet Take 40 mg by mouth Every Morning.     • empagliflozin (JARDIANCE) 25 MG tablet tablet Take 25 mg by mouth Every Morning.     • fenofibrate 160 MG tablet TAKE 1 TABLET BY MOUTH EVERY DAY (Patient taking differently: Take 160 mg by mouth Every Morning.) 90 tablet 1   • fexofenadine (ALLEGRA) 180 MG tablet Take 180 mg by mouth Every Night.     • levothyroxine (SYNTHROID, LEVOTHROID)  112 MCG tablet Take 112 mcg by mouth Every Morning. ONE TAB DAILY MON-SAT, 1/2 TAB Sunday/BRAND NAME     • metFORMIN ER (GLUCOPHAGE-XR) 500 MG 24 hr tablet Take 1,000 mg by mouth 2 (Two) Times a Day.     • metoprolol succinate XL (TOPROL-XL) 50 MG 24 hr tablet TAKE 1 TABLET BY MOUTH EVERY DAY**NEEDS TO MAKE APPT** (Patient taking differently: Take 50 mg by mouth Every Morning.) 90 tablet 0   • rosuvastatin (CRESTOR) 20 MG tablet TAKE 1 TABLET BY MOUTH EVERY DAY (Patient taking differently: Take 20 mg by mouth Every Morning.) 30 tablet 0   • SITagliptin (JANUVIA) 100 MG tablet Take 100 mg by mouth Every Morning.     • vitamin D (ERGOCALCIFEROL) 79344 UNITS capsule capsule Take 50,000 Units by mouth Every 7 (Seven) Days. MONDAYS          Past Medical History:   Diagnosis Date   • Anxiety    • Arthritis    • Cataract     bilateral   • GERD (gastroesophageal reflux disease)    • H/O colonoscopy 08/2012   • History of kidney stones    • History of migraine    • Hyperlipidemia    • Hypertension    • Low back pain    • PONV (postoperative nausea and vomiting)    • Rotator cuff injury 09/2020    RIGHT   • Thyroid disease     now hypothyroidism s/p thyroidectomy   • Type 2 diabetes mellitus with hyperglycemia, without long-term current use of insulin (HCC)        Immunization History   Administered Date(s) Administered   • COVID-19 (MODERNA) BOOSTER 02/01/2021, 03/01/2021, 10/26/2021   • Flu Vaccine Split Quad 10/10/2021   • FluLaval/Fluarix/Fluzone >6 10/02/2015, 10/20/2017, 12/02/2018, 10/04/2019   • FluMist 2-49yrs 10/02/2015   • Flucelvax Quad Vial =>4yrs 10/04/2020   • Fluzone Split Quad (Multi-dose) 10/04/2016, 12/02/2018   • Hepatitis A 12/02/2018   • Influenza Quad Vaccine (Inpatient) 10/04/2016, 12/02/2018   • Influenza, Unspecified 10/10/2021   • Pneumococcal Conjugate 13-Valent (PCV13) 11/11/2013   • Pneumococcal Polysaccharide (PPSV23) 10/04/2019   • Pneumococcal, Unspecified 11/11/2013   • Shingrix 10/06/2020  "  • Tdap 11/24/2015        OBJECTIVE    Vital Signs:   /70   Pulse 75   Temp 96.9 °F (36.1 °C) (Temporal)   Ht 154.9 cm (61\")   Wt 89.8 kg (198 lb)   SpO2 95%   BMI 37.41 kg/m²     Physical Exam  Vitals reviewed.   Constitutional:       General: She is not in acute distress.     Appearance: Normal appearance. She is obese. She is not ill-appearing.   HENT:      Head: Normocephalic and atraumatic.      Right Ear: Tympanic membrane, ear canal and external ear normal. There is no impacted cerumen.      Left Ear: Tympanic membrane, ear canal and external ear normal. There is no impacted cerumen.      Mouth/Throat:      Mouth: Mucous membranes are moist.      Pharynx: No oropharyngeal exudate or posterior oropharyngeal erythema.   Eyes:      General: No scleral icterus.     Extraocular Movements: Extraocular movements intact.      Conjunctiva/sclera: Conjunctivae normal.      Pupils: Pupils are equal, round, and reactive to light.   Neck:      Comments: Transverse well healed surgical scar in the region of the thyroid    Cardiovascular:      Rate and Rhythm: Normal rate and regular rhythm.      Heart sounds: Normal heart sounds. No murmur heard.  Pulmonary:      Effort: Pulmonary effort is normal. No respiratory distress.      Breath sounds: Normal breath sounds. No wheezing.   Abdominal:      General: Bowel sounds are normal. There is no distension.      Palpations: Abdomen is soft.      Tenderness: There is no abdominal tenderness. There is no guarding.   Musculoskeletal:      Cervical back: Neck supple. No tenderness.      Right lower leg: No edema.      Left lower leg: No edema.   Lymphadenopathy:      Cervical: No cervical adenopathy.   Skin:     General: Skin is warm and dry.      Coloration: Skin is not jaundiced.   Neurological:      General: No focal deficit present.      Mental Status: She is alert and oriented to person, place, and time.      Cranial Nerves: No cranial nerve deficit.      Motor: No " weakness.   Psychiatric:         Mood and Affect: Mood normal.         Behavior: Behavior normal.         Thought Content: Thought content normal.                         ASSESSMENT & PLAN     #Annual Preventative Health Examination   -Age and sex appropriate physical exam performed and documented. Updated past medical, family, social and surgical histories as well as allergies and care team list. Addressed care gaps listed in the medical record.  -Encouraged seat belt use for every car ride for patient and all occupants. Encouraged sunscreen use to reduce risk of skin cancer for any days with sun exposure over 20 minutes.  Discussed the importance of smoke and carbon monoxide detectors in the home.   -Encouraged annual dental and vision exams as part of their overall health.  -Encouraged minimum of 30 minutes or more of exercise at a brisk walk or higher 5 days per week combined with a well-balanced diet.  -Immunizations reviewed and updated in EMR. Recommend she complete Shingrix series. Recommended second COVID 19 boosters.   The 10-year ASCVD risk score (Chinyere PEREA Jr., et al., 2013) is: 6.4%    Values used to calculate the score:      Age: 61 years      Sex: Female      Is Non- : No      Diabetic: Yes      Tobacco smoker: No      Systolic Blood Pressure: 110 mmHg      Is BP treated: Yes      HDL Cholesterol: 46 mg/dL      Total Cholesterol: 153 mg/dL   -Lipid screening:  Patient is already on statin therapy.   -Aspirin for primary or secondary prevention: Not applicable, patient is greater than age 60 and risks outweigh benefits for primary prevention.  -Depression screening: Pt has diagnosis of depression receiving treatment  -Diabetes screening:  Patient already has a diagnosis of diabetes mellitus and is being treated.   -Tobacco use screening: Patient denies cigarette use. Tobacco counseling was was not indicated.  -Alcohol use screening: Patient reports drinking 0-1 drinks per week..  Alcohol abuse counseling was was not indicated.  -Illicit drug screening: Patient does not use illicit drugs.  -Hypertension screening: Patient with known diagnosis of hypertension and is receiving treatment.  -HIV screening: Discussed with patient the importance of identifying asymptomatic HIV infection for adults in their age group with a one time screening test .Patient declined screening.   -Syphilis screening: Syphilis screening not indicated.  -Hepatitis B virus screening: Screening not indicated, not in a high-risk group.  -Hepatitis C virus screening:  Patient has already completed Hepatitis C screening. Negative screening on file.  -Colon cancer screening: scheduled July 2022  -Lung cancer screening: Patient has never smoked.  -Cervical cancer screening:  Informed patient that the USPSTF recommends screening for cervical cancer every 3 years with cervical cytology alone in women aged 21 to 29 years. For women aged 30 to 65 years, the USPSTF recommends screening every 3 years with cervical cytology alone, every 5 years with high-risk human papillomavirus (hrHPV) testing alone, or every 5 years with HPV testing in combination with cytology (cotesting). Will obtain results from GYN.   -Breast cancer screening: Informed patient that the USPSTF recommends biennial screening mammography for women aged 50 to 74 years. was done this year per patient report, will obtain results from [facility]  -Osteoporosis screening: Informed patient that the USPSTF recommends screening for osteoporosis with bone measurement testing to prevent osteoporotic fractures in women 65 years and older. Will obtain results from [facility] and is taking over the counter calcium and already taking vitamin D  -Patient's (Body mass index is 37.41 kg/m².) indicates that they are morbidly obese (BMI > 40 or > 35 with obesity - related health condition) with health related conditions that include hypertension, diabetes mellitus and  dyslipidemias . Weight is unchanged. BMI is is above average; BMI management plan is completed. We discussed low calorie, low carb based diet program, portion control and increasing exercise.       Follow up in 1 year for annual physical exam.    Patient/family had no further questions at this time and verbalized understanding of the plan discussed today.

## 2022-07-15 ENCOUNTER — HOSPITAL ENCOUNTER (OUTPATIENT)
Facility: HOSPITAL | Age: 62
Setting detail: HOSPITAL OUTPATIENT SURGERY
Discharge: HOME | End: 2022-07-15
Attending: INTERNAL MEDICINE | Admitting: INTERNAL MEDICINE

## 2022-07-15 ENCOUNTER — ANESTHESIA (OUTPATIENT)
Dept: GASTROENTEROLOGY | Facility: HOSPITAL | Age: 62
End: 2022-07-15

## 2022-07-15 ENCOUNTER — ON CAMPUS - OUTPATIENT (OUTPATIENT)
Dept: URBAN - METROPOLITAN AREA HOSPITAL 114 | Facility: HOSPITAL | Age: 62
End: 2022-07-15
Payer: COMMERCIAL

## 2022-07-15 ENCOUNTER — ANESTHESIA EVENT (OUTPATIENT)
Dept: GASTROENTEROLOGY | Facility: HOSPITAL | Age: 62
End: 2022-07-15

## 2022-07-15 VITALS
HEIGHT: 61 IN | BODY MASS INDEX: 34.87 KG/M2 | SYSTOLIC BLOOD PRESSURE: 98 MMHG | HEART RATE: 75 BPM | WEIGHT: 184.7 LBS | DIASTOLIC BLOOD PRESSURE: 66 MMHG | RESPIRATION RATE: 16 BRPM | OXYGEN SATURATION: 98 %

## 2022-07-15 DIAGNOSIS — D12.5 BENIGN NEOPLASM OF SIGMOID COLON: ICD-10-CM

## 2022-07-15 DIAGNOSIS — Z86.010 HX OF COLONIC POLYP: ICD-10-CM

## 2022-07-15 DIAGNOSIS — Z12.11 ENCOUNTER FOR SCREENING FOR MALIGNANT NEOPLASM OF COLON: ICD-10-CM

## 2022-07-15 DIAGNOSIS — Z86.010 PERSONAL HISTORY OF COLONIC POLYPS: ICD-10-CM

## 2022-07-15 DIAGNOSIS — K64.0 FIRST DEGREE HEMORRHOIDS: ICD-10-CM

## 2022-07-15 LAB — GLUCOSE BLDC GLUCOMTR-MCNC: 144 MG/DL (ref 70–130)

## 2022-07-15 PROCEDURE — 45385 COLONOSCOPY W/LESION REMOVAL: CPT | Performed by: INTERNAL MEDICINE

## 2022-07-15 PROCEDURE — 82962 GLUCOSE BLOOD TEST: CPT

## 2022-07-15 PROCEDURE — 88305 TISSUE EXAM BY PATHOLOGIST: CPT | Performed by: INTERNAL MEDICINE

## 2022-07-15 PROCEDURE — 25010000002 PROPOFOL 10 MG/ML EMULSION: Performed by: ANESTHESIOLOGY

## 2022-07-15 RX ORDER — SODIUM CHLORIDE, SODIUM LACTATE, POTASSIUM CHLORIDE, CALCIUM CHLORIDE 600; 310; 30; 20 MG/100ML; MG/100ML; MG/100ML; MG/100ML
30 INJECTION, SOLUTION INTRAVENOUS CONTINUOUS PRN
Status: DISCONTINUED | OUTPATIENT
Start: 2022-07-15 | End: 2022-07-15 | Stop reason: HOSPADM

## 2022-07-15 RX ORDER — SODIUM CHLORIDE 0.9 % (FLUSH) 0.9 %
10 SYRINGE (ML) INJECTION EVERY 12 HOURS SCHEDULED
Status: DISCONTINUED | OUTPATIENT
Start: 2022-07-15 | End: 2022-07-15 | Stop reason: HOSPADM

## 2022-07-15 RX ORDER — LIDOCAINE HYDROCHLORIDE 20 MG/ML
INJECTION, SOLUTION INFILTRATION; PERINEURAL AS NEEDED
Status: DISCONTINUED | OUTPATIENT
Start: 2022-07-15 | End: 2022-07-15 | Stop reason: SURG

## 2022-07-15 RX ORDER — SODIUM CHLORIDE 0.9 % (FLUSH) 0.9 %
10 SYRINGE (ML) INJECTION AS NEEDED
Status: DISCONTINUED | OUTPATIENT
Start: 2022-07-15 | End: 2022-07-15 | Stop reason: HOSPADM

## 2022-07-15 RX ORDER — PROPOFOL 10 MG/ML
VIAL (ML) INTRAVENOUS AS NEEDED
Status: DISCONTINUED | OUTPATIENT
Start: 2022-07-15 | End: 2022-07-15 | Stop reason: SURG

## 2022-07-15 RX ORDER — PROPOFOL 10 MG/ML
VIAL (ML) INTRAVENOUS CONTINUOUS PRN
Status: DISCONTINUED | OUTPATIENT
Start: 2022-07-15 | End: 2022-07-15 | Stop reason: SURG

## 2022-07-15 RX ORDER — SODIUM CHLORIDE, SODIUM LACTATE, POTASSIUM CHLORIDE, CALCIUM CHLORIDE 600; 310; 30; 20 MG/100ML; MG/100ML; MG/100ML; MG/100ML
INJECTION, SOLUTION INTRAVENOUS CONTINUOUS PRN
Status: DISCONTINUED | OUTPATIENT
Start: 2022-07-15 | End: 2022-07-15 | Stop reason: SURG

## 2022-07-15 RX ADMIN — SODIUM CHLORIDE, POTASSIUM CHLORIDE, SODIUM LACTATE AND CALCIUM CHLORIDE 30 ML/HR: 600; 310; 30; 20 INJECTION, SOLUTION INTRAVENOUS at 07:51

## 2022-07-15 RX ADMIN — SODIUM CHLORIDE, POTASSIUM CHLORIDE, SODIUM LACTATE AND CALCIUM CHLORIDE: 600; 310; 30; 20 INJECTION, SOLUTION INTRAVENOUS at 08:01

## 2022-07-15 RX ADMIN — PROPOFOL 50 MG: 10 INJECTION, EMULSION INTRAVENOUS at 08:08

## 2022-07-15 RX ADMIN — LIDOCAINE HYDROCHLORIDE 60 MG: 20 INJECTION, SOLUTION INFILTRATION; PERINEURAL at 08:01

## 2022-07-15 RX ADMIN — Medication 180 MCG/KG/MIN: at 08:01

## 2022-07-15 RX ADMIN — PROPOFOL 100 MG: 10 INJECTION, EMULSION INTRAVENOUS at 08:01

## 2022-07-15 RX ADMIN — PROPOFOL 50 MG: 10 INJECTION, EMULSION INTRAVENOUS at 08:11

## 2022-07-15 NOTE — ANESTHESIA POSTPROCEDURE EVALUATION
Patient: Lynda Rueda    Procedure Summary     Date: 07/15/22 Room / Location:  [facility]    Anesthesia Start: 0755 Anesthesia Stop: 0826    Procedure: COLONOSCOPY to cecum with cold snare polypectomy (N/A ) Diagnosis:     Surgeons: Sunil Cardona MD Provider: Alexsander Iqbal MD    Anesthesia Type: MAC ASA Status: 3          Anesthesia Type: MAC    Vitals  Vitals Value Taken Time   BP 93/60 07/15/22 0839   Temp     Pulse 75 07/15/22 0849   Resp 16 07/15/22 0839   SpO2 98 % 07/15/22 0849   Vitals shown include unvalidated device data.        Post Anesthesia Care and Evaluation    Level of consciousness: awake  Pain management: satisfactory to patient    Airway patency: patent  Anesthetic complications: No anesthetic complications  PONV Status: controlled  Cardiovascular status: acceptable  Respiratory status: acceptable  Hydration status: acceptable

## 2022-07-15 NOTE — ANESTHESIA PREPROCEDURE EVALUATION
Anesthesia Evaluation     Patient summary reviewed   history of anesthetic complications: PONV  NPO Solid Status: > 6 hours  NPO Liquid Status: > 2 hours           Airway   Mallampati: II  TM distance: >3 FB  Neck ROM: full  Dental - normal exam     Pulmonary    (-) COPD, asthma, sleep apnea  Cardiovascular     (+) hypertension, hyperlipidemia,   (-) CAD, dysrhythmias, angina, CHF    ROS comment: 3/2018 Stress test negative    3/2018 TTE LVEF 70%, Gr 1 diastolic dysfunction, RV fxn nl, no valvular abnormalities    Neuro/Psych  (+) numbness (lumbar radiculopathy), psychiatric history Anxiety,    (-) seizures, CVA  GI/Hepatic/Renal/Endo    (+) morbid obesity, GERD well controlled,  liver disease fatty liver disease, diabetes mellitus type 2, thyroid problem hypothyroidism  (-) no renal disease    Musculoskeletal     Abdominal    Substance History      OB/GYN          Other                        Anesthesia Plan    ASA 3     MAC       Anesthetic plan, risks, benefits, and alternatives have been provided, discussed and informed consent has been obtained with: patient.        CODE STATUS:

## 2022-07-15 NOTE — H&P
[facility]   HISTORY AND PHYSICAL    Patient Name: Lynda Rueda  : 1960  MRN: [id number]  Primary Care Physician:  Shun Miranda DO  Date of admission: 7/15/2022    Subjective   Subjective     Chief Complaint: personal history of colon polyps     History of Present Illness  Some upset stomach since starting ozempic  Review of Systems   All other systems reviewed and are negative.       Personal History     Past Medical History:   Diagnosis Date   • Anxiety    • Arthritis    • Cataract     bilateral   • GERD (gastroesophageal reflux disease)    • H/O colonoscopy 2012   • History of kidney stones    • History of migraine    • Hyperlipidemia    • Hypertension    • Low back pain    • Osteopenia    • PONV (postoperative nausea and vomiting)    • Rotator cuff injury 2020    RIGHT   • Thyroid disease     now hypothyroidism s/p thyroidectomy   • Type 2 diabetes mellitus with hyperglycemia, without long-term current use of insulin (HCC)        Past Surgical History:   Procedure Laterality Date   • APPENDECTOMY     • BREAST BIOPSY Right     BENIGN   •  SECTION     • CHOLECYSTECTOMY     • COLONOSCOPY N/A 06/15/2018    Procedure: COLONOSCOPY INTO CECUM AND T.I. WITH BIOPSIES AND COLD BIOPSY POLYPECTOMY;  Surgeon: Sunil Cardona MD;  Location: [facility] ENDOSCOPY;  Service: Gastroenterology   • DILATATION AND CURETTAGE     • HYSTERECTOMY  2007    OVARIAN MASS/SMALL INTESTINE   • KNEE SURGERY Left     meniscal surgery   • LASIK Bilateral    • OVARIAN CYST REMOVAL     • RHINOPLASTY     • SHOULDER ARTHROSCOPY W/ ROTATOR CUFF REPAIR Right 2021    Procedure: SHOULDER ARTHROSCOPY WITH ROTATOR CUFF REPAIR, subacromial decompression;  Surgeon: Juan Pablo Major MD;  [place] [facility] [place] [facility] [place]  Service: [place]opedics;  Laterality: Right;   • THYROIDECTOMY  2016       Family History: family history includes COPD in her mother; Colon cancer (age of onset: 80) in her father; Heart attack (age of  onset: 63) in her brother; Heart failure in her mother; Hyperlipidemia in her mother; Hypertension in her mother; Melanoma in her mother; Thyroid disease in her sister; Transient ischemic attack in her mother. Otherwise pertinent FHx was reviewed and not pertinent to current issue.    Social History:  reports that she has never smoked. She has never used smokeless tobacco. She reports current alcohol use. She reports that she does not use drugs.    Home Medications:  FreeStyle Mar 14 Day Sensor, Semaglutide (1 MG/DOSE), citalopram, empagliflozin, fenofibrate, fexofenadine, glipizide, glucose blood, levothyroxine, metFORMIN ER, metoprolol succinate XL, rosuvastatin, triamcinolone, and vitamin D    Allergies:  Allergies   Allergen Reactions   • Celecoxib Hives   • Methocarbamol Hives     ROBAXIN    • Montelukast Hives   • Penicillins Hives   • Carbomer Other (See Comments)     UNKNOWN REACTION   • [place] Other (See Comments)     UNKNOWN PER PT/POSITIVE ALLERGY TESTING   • Gemifloxacin Hives   • Grapefruit Itching   • Nickel Itching     WHEN PT TOOK THE ALLERGY TEST NICKEL, ACRYLIC AND COBALT CAME UP AS ALLERGIES   • Watermelon [Citrullus Vulgaris] Itching   • Tamiflu [Oseltamivir Phosphate] Rash       Objective    Objective     Vitals:   Heart Rate:  [89] 89  Resp:  [16] 16  BP: (119)/(75) 119/75    Physical Exam  HENT:      Right Ear: External ear normal.      Left Ear: External ear normal.      Mouth/Throat:      Pharynx: Oropharynx is clear.   Eyes:      Conjunctiva/sclera: Conjunctivae normal.   Cardiovascular:      Rate and Rhythm: Normal rate.      Pulses: Normal pulses.   Pulmonary:      Effort: Pulmonary effort is normal.   Abdominal:      General: Abdomen is flat.   Skin:     General: Skin is warm and dry.   Neurological:      General: No focal deficit present.      Mental Status: She is alert.   Psychiatric:         Mood and Affect: Mood normal.         Result Review    Result Review:  I have personally  reviewed the results from the time of this admission to 7/15/2022 08:00 EDT and agree with these findings:  []  Laboratory list / accordion  []  Microbiology  []  Radiology  []  EKG/Telemetry   []  Cardiology/Vascular   []  Pathology  []  Old records  []  Other:    Assessment & Plan   Assessment / Plan     Brief Patient Summary:  Lynad Rueda is a 61 y.o. female   Personal history of colon polyps     Active Hospital Problems:  There are no active hospital problems to display for this patient.    Plan: Colonoscopy risks, alternatives and benefits discussed with patient and the patient is agreeable to having procedure done.    DVT prophylaxis:  No DVT prophylaxis order currently exists.    CODE STATUS:       Admission Status:  I believe this patient meets outpatient  status.    Sunil Cardona MD

## 2022-07-18 LAB
LAB AP CASE REPORT: NORMAL
PATH REPORT.FINAL DX SPEC: NORMAL
PATH REPORT.GROSS SPEC: NORMAL

## 2022-07-28 ENCOUNTER — OFFICE (OUTPATIENT)
Dept: URBAN - METROPOLITAN AREA CLINIC 66 | Facility: CLINIC | Age: 62
End: 2022-07-28
Payer: COMMERCIAL

## 2022-07-28 VITALS
DIASTOLIC BLOOD PRESSURE: 67 MMHG | SYSTOLIC BLOOD PRESSURE: 100 MMHG | WEIGHT: 186 LBS | HEART RATE: 87 BPM | HEIGHT: 61 IN

## 2022-07-28 DIAGNOSIS — R63.0 ANOREXIA: ICD-10-CM

## 2022-07-28 DIAGNOSIS — R14.2 ERUCTATION: ICD-10-CM

## 2022-07-28 DIAGNOSIS — R11.0 NAUSEA: ICD-10-CM

## 2022-07-28 PROCEDURE — 99214 OFFICE O/P EST MOD 30 MIN: CPT | Performed by: NURSE PRACTITIONER

## 2022-07-28 RX ORDER — FAMOTIDINE 40 MG/1
40 TABLET, FILM COATED ORAL
Qty: 30 | Refills: 11 | Status: ACTIVE
Start: 2022-07-28

## 2022-07-28 RX ORDER — OMEPRAZOLE 20 MG/1
20 CAPSULE, DELAYED RELEASE ORAL
Qty: 30 | Refills: 11 | Status: ACTIVE
Start: 2022-07-28

## 2023-02-03 RX ORDER — CITALOPRAM 40 MG/1
40 TABLET ORAL EVERY MORNING
Qty: 90 TABLET | Refills: 1 | Status: SHIPPED | OUTPATIENT
Start: 2023-02-03

## 2023-05-01 ENCOUNTER — OFFICE VISIT (OUTPATIENT)
Dept: ORTHOPEDIC SURGERY | Facility: CLINIC | Age: 63
End: 2023-05-01
Payer: COMMERCIAL

## 2023-05-01 VITALS — WEIGHT: 175.1 LBS | HEIGHT: 61 IN | BODY MASS INDEX: 33.06 KG/M2 | TEMPERATURE: 97.8 F

## 2023-05-01 DIAGNOSIS — M25.511 RIGHT SHOULDER PAIN, UNSPECIFIED CHRONICITY: ICD-10-CM

## 2023-05-01 DIAGNOSIS — Z09 SURGERY FOLLOW-UP: Primary | ICD-10-CM

## 2023-05-01 RX ORDER — METHYLPREDNISOLONE ACETATE 80 MG/ML
80 INJECTION, SUSPENSION INTRA-ARTICULAR; INTRALESIONAL; INTRAMUSCULAR; SOFT TISSUE
Status: COMPLETED | OUTPATIENT
Start: 2023-05-01 | End: 2023-05-01

## 2023-05-01 RX ORDER — LEVOTHYROXINE SODIUM 0.1 MG/1
TABLET ORAL
COMMUNITY
Start: 2023-03-26

## 2023-05-01 RX ORDER — FLUCONAZOLE 150 MG/1
TABLET ORAL
COMMUNITY
Start: 2023-03-15

## 2023-05-01 RX ADMIN — METHYLPREDNISOLONE ACETATE 80 MG: 80 INJECTION, SUSPENSION INTRA-ARTICULAR; INTRALESIONAL; INTRAMUSCULAR; SOFT TISSUE at 17:04

## 2023-05-01 NOTE — PROGRESS NOTES
"Patient:Lynda Rueda    YOB: 1960    Medical Record Number:[id number]    Chief Complaints: Right shoulder pain    History of Present Illness:     62 y.o. female patient who presents for her right shoulder.  She is about 2 years out from a rotator cuff repair.  She says she did great after that surgery.  She continued to do well until about 2 or 3 months ago.  She tells me that she got a bone density test that got her worried.  This caused her to take up a workout program and [employer].  She has been doing that for about 6 months.  After about 2 or 3 months is when the shoulder started to bother her.  The pain is anterior and intermittent.  It is typically associated with certain reaching and lifting movements.  She says it is a different type of pain than what she was having before her rotator cuff surgery.  The pain seems to be more anterior.  She does not have any night pain which was an issue prior to the rotator cuff surgery.    Allergies:  Allergies   Allergen Reactions   • Celecoxib Hives   • Methocarbamol Hives     ROBAXIN    • Montelukast Hives   • Penicillins Hives   • Carbomer Other (See Comments)     \"Acrylic\" - unknown reaction   • [place] Other (See Comments)     UNKNOWN PER PT/POSITIVE ALLERGY TESTING   • Dulaglutide GI Intolerance     Abdominal pain, constipation   • Gemifloxacin Hives   • Grapefruit Itching   • Nickel Itching     WHEN PT TOOK THE ALLERGY TEST NICKEL, ACRYLIC AND COBALT CAME UP AS ALLERGIES   • Watermelon [Citrullus Vulgaris] Itching   • Tamiflu [Oseltamivir Phosphate] Rash       Home Medications:    Current Outpatient Medications:   •  citalopram (CeleXA) 40 MG tablet, Take 1 tablet by mouth Every Morning., Disp: 90 tablet, Rfl: 1  •  Continuous Blood Gluc Sensor (FreeStyle Mar 14 Day Sensor) misc, APPLY 1 EACH TOPICALLY EVERY 14 (FOURTEEN) DAYS., Disp: , Rfl:   •  empagliflozin (JARDIANCE) 25 MG tablet tablet, Take 1 tablet by mouth Every Morning., Disp: , Rfl: "   •  fexofenadine (ALLEGRA) 180 MG tablet, Take 1 tablet by mouth Every Night., Disp: , Rfl:   •  fluconazole (DIFLUCAN) 150 MG tablet, TAKE 1 (ONE) TABLET 1 NOW AND REPEAT EVERY 3 DAYS, Disp: , Rfl:   •  levothyroxine (SYNTHROID, LEVOTHROID) 100 MCG tablet, TAKE 1 TABLET BY MOUTH DAILY FOR 1 DAY M-SAT AND NONE ON SUNDAYS., Disp: , Rfl:   •  metFORMIN ER (GLUCOPHAGE-XR) 500 MG 24 hr tablet, Take 2 tablets by mouth 2 (Two) Times a Day., Disp: , Rfl:   •  metoprolol succinate XL (TOPROL-XL) 50 MG 24 hr tablet, TAKE 1 TABLET BY MOUTH EVERY DAY**NEEDS TO MAKE APPT** (Patient taking differently: Take 1 tablet by mouth Every Morning.), Disp: 90 tablet, Rfl: 0  •  rosuvastatin (CRESTOR) 20 MG tablet, TAKE 1 TABLET BY MOUTH EVERY DAY (Patient taking differently: Take 1 tablet by mouth Every Morning.), Disp: 30 tablet, Rfl: 0  •  Semaglutide, 1 MG/DOSE, (OZEMPIC) 2 MG/1.5ML solution pen-injector, Inject  under the skin into the appropriate area as directed 1 (One) Time Per Week., Disp: , Rfl:   •  triamcinolone (KENALOG) 0.1 % cream, APPLY BY TOPICAL ROUTE 2 TIMES A DAY TO ITCHY SPOTS, Disp: , Rfl:   •  vitamin D (ERGOCALCIFEROL) 25217 UNITS capsule capsule, Take 1 capsule by mouth Every 7 (Seven) Days. MONDAYS, Disp: , Rfl:   •  fenofibrate 160 MG tablet, TAKE 1 TABLET BY MOUTH EVERY DAY (Patient taking differently: Take 1 tablet by mouth Every Morning.), Disp: 90 tablet, Rfl: 1  •  glipizide (GLUCOTROL) 10 MG tablet, Take 1 tablet by mouth Every Morning., Disp: 30 tablet, Rfl: 1  •  glucose blood test strip, OneTouch Ultra Blue In Vitro Strip; Patient Sig: OneTouch Ultra Blue In Vitro Strip ; 100; 0; 11-Nov-2013; Active, Disp: , Rfl:   •  levothyroxine (SYNTHROID, LEVOTHROID) 112 MCG tablet, Take 112 mcg by mouth Every Morning. ONE TAB DAILY MON-SAT, 1/2 TAB Sunday/BRAND NAME (Patient not taking: Reported on 5/1/2023), Disp: , Rfl:     Past Medical History:   Diagnosis Date   • Anxiety    • Arthritis    • Cataract      bilateral   • GERD (gastroesophageal reflux disease)    • H/O colonoscopy 2012   • History of kidney stones    • History of migraine    • Hyperlipidemia    • Hypertension    • Low back pain    • Nonproliferative diabetic retinopathy     LEFT   • Osteopenia    • PONV (postoperative nausea and vomiting)    • Rotator cuff injury 2020    RIGHT   • Thyroid disease     now hypothyroidism s/p thyroidectomy   • Type 2 diabetes mellitus with hyperglycemia, without long-term current use of insulin        Past Surgical History:   Procedure Laterality Date   • APPENDECTOMY     • BREAST BIOPSY Right     BENIGN   •  SECTION     • CHOLECYSTECTOMY     • COLONOSCOPY N/A 06/15/2018    Procedure: COLONOSCOPY INTO CECUM AND T.I. WITH BIOPSIES AND COLD BIOPSY POLYPECTOMY;  Surgeon: Sunil Cardona MD;  Location: [facility] ENDOSCOPY;  Service: Gastroenterology   • COLONOSCOPY N/A 7/15/2022    Procedure: COLONOSCOPY to cecum with cold snare polypectomy;  Surgeon: Sunil Cardona MD;  Location: [facility] ENDOSCOPY;  Service: Gastroenterology;  Laterality: N/A;  pre- hx colon polyps   post- polyp    • DILATATION AND CURETTAGE     • HYSTERECTOMY  2007    OVARIAN MASS/SMALL INTESTINE   • KNEE SURGERY Left     meniscal surgery   • LASIK Bilateral    • OVARIAN CYST REMOVAL     • RHINOPLASTY     • SHOULDER ARTHROSCOPY W/ ROTATOR CUFF REPAIR Right 2021    Procedure: SHOULDER ARTHROSCOPY WITH ROTATOR CUFF REPAIR, subacromial decompression;  Surgeon: Juan Pablo Major MD;  [place]  [facility] [place] [facility] [place]: [place]ics;  Laterality: Right;   • THYROIDECTOMY  2016       Social History     Occupational History   • Occupation:      Comment: [employer]   Tobacco Use   • Smoking status: Never     Passive exposure: Past   • Smokeless tobacco: Never   Vaping Use   • Vaping Use: Never used   Substance and Sexual Activity   • Alcohol use: Yes     Comment: ONE DRINK PER MONTH   • Drug use: Never   • Sexual  "activity: Defer      Social History     Social History Narrative    LIVES WITH SPOUSE       Family History   Problem Relation Age of Onset   • COPD Mother    • Hyperlipidemia Mother    • Hypertension Mother    • Melanoma Mother    • Heart failure Mother    • Transient ischemic attack Mother    • Colon cancer Father 80   • Thyroid disease Sister    • Heart attack Brother 63   • Malig Hyperthermia Neg Hx        Review of Systems:      Constitutional: Denies fever, shaking or chills   Eyes: Denies change in visual acuity   HEENT: Denies nasal congestion or sore throat   Respiratory: Denies cough or shortness of breath   Cardiovascular: Denies chest pain or edema  Endocrine: Denies tremors, palpitations, intolerance of heat or cold, polyuria, polydipsia.  GI: Denies abdominal pain, nausea, vomiting, bloody stools or diarrhea  : Denies frequency, urgency, incontinence, retention, or nocturia.  Musculoskeletal: Denies numbness, tingling or loss of motor function except as above  Integument: Denies rash, lesion or ulceration   Neurologic: Denies headache or focal weakness, deficits  Heme: Denies spontaneous or excessive bleeding, epistaxis, hematuria, melena, fatigue, enlarged or tender lymph nodes.      All other pertinent positives and negatives as noted above in HPI.    Physical Exam:62 y.o. female  Vitals:    05/01/23 1513   Temp: 97.8 °F (36.6 °C)   Weight: 79.4 kg (175 lb 1.6 oz)   Height: 154.9 cm (61\")       General:  Patient is awake and alert.  Appears in no acute distress or discomfort.    Psych:  Affect and demeanor are appropriate.    Extremities:  Right shoulder is examined.  Skin is benign.  No gross abnormalities on inspection including any atrophy, swellings, or masses.  No palpable masses or adenopathy.  Focal tenderness noted over biceps groove.  Full shoulder motion.  No evident instability or apprehension.  Positive Speeds and Yergasons maneuver which reproduce her typical pain.  Good strength in the " rotator cuff, deltoid, biceps, triceps, and .  Intact sensation throughout the arm.  Brisk cap refill.  Palpable radial pulse.    Imaging:  AP, scapular Y, and axillary views of the right shoulder are ordered by myself and reviewed to evaluate the patient's complaint.  No comparison films are immediately available.  The x-rays show no obvious acute abnormalities, lesions, masses, significant degenerative changes, or other concerning findings.  The acromiohumeral interval is normal.  Glenoid version appears normal as well.    Assessment/Plan: Right long head of biceps tendinitis    I went back and reviewed her op note.  We left her biceps alone.  Her exam certainly suggest this is inflamed today.  I suggested that an injection may help.  The risk, benefits and alternatives were discussed but she consented and injection forms describe below.  She will follow-up as needed.    Juan Pablo Major MD    05/01/2023    Medium Joint Arthrocentesis  Date/Time: 5/1/2023 5:04 PM  Consent given by: patient  Site marked: site marked  Timeout: Immediately prior to procedure a time out was called to verify the correct patient, procedure, equipment, support staff and site/side marked as required   Supporting Documentation  Indications: pain   Procedure Details  Location: shoulder - Acromioclavicular joint: biceps.  Preparation: Patient was prepped and draped in the usual sterile fashion  Needle size: 25 G  Approach: anterolateral  Medications administered: 80 mg methylPREDNISolone acetate 80 MG/ML; 2 mL lidocaine (cardiac)  Patient tolerance: patient tolerated the procedure well with no immediate complications

## 2023-05-22 ENCOUNTER — OFFICE VISIT (OUTPATIENT)
Dept: INTERNAL MEDICINE | Facility: CLINIC | Age: 63
End: 2023-05-22
Payer: COMMERCIAL

## 2023-05-22 VITALS
HEART RATE: 72 BPM | HEIGHT: 61 IN | DIASTOLIC BLOOD PRESSURE: 60 MMHG | WEIGHT: 171 LBS | TEMPERATURE: 97.3 F | BODY MASS INDEX: 32.28 KG/M2 | OXYGEN SATURATION: 98 % | SYSTOLIC BLOOD PRESSURE: 100 MMHG

## 2023-05-22 DIAGNOSIS — Z00.00 ANNUAL PHYSICAL EXAM: Primary | ICD-10-CM

## 2023-05-22 DIAGNOSIS — E11.3291 TYPE 2 DIABETES MELLITUS WITH RIGHT EYE AFFECTED BY MILD NONPROLIFERATIVE RETINOPATHY WITHOUT MACULAR EDEMA, WITHOUT LONG-TERM CURRENT USE OF INSULIN: ICD-10-CM

## 2023-05-22 RX ORDER — METOPROLOL SUCCINATE 50 MG/1
50 TABLET, EXTENDED RELEASE ORAL EVERY MORNING
Qty: 90 TABLET | Refills: 1 | Status: SHIPPED | OUTPATIENT
Start: 2023-05-22

## 2023-05-22 NOTE — PROGRESS NOTES
Shun Miranda D.O.  Internal Medicine  [facility]  [address], [address]  [place] [address]  [phone]    Chief Complaint  Annual checkup    HISTORY    Lynda Rueda is a 62 y.o. female who presents to the office today as a  an established patient for their annual preventative exam.     No hospitalization(s) within the last year.     Current exercise regimen: works out 30 minutes every day    Status of chronic medical conditions:    Type 2 diabetes with diabetic retinopathy : goes to [facility]. She has a freestyle bran. Taking empagliflozin 25 mg daily, ozempic 1 mg weekly, metformin ER 1000 mg twice daily. Last A1c 6.5 Jan 2023. Follows with eye doctor regularly.      HLD: taking rosuvastatin 20 mg daily and fenofibrate 160 mg daily      Anxiety and Depression: taking citalopram 40 mg daily, states this is doing well     Seasonal allergies: takes allegra as needed     Hypothyroidism: had a thyroidectomy due to nodules, denies thyroid cancer history, and is now on levothyroxine 100 mcg Mon-Sat, off on Sunday     HTN: taking metoprolol succinate 50 mg daily; she doesn't check her BP at home.      Vitamin D deficiency: takes vitamin D 50,000 units weekly     Osteopenia: follows with GYN [facility], taking vitamin D supplement as above. Exercising regularly.     Patient's overall comments about their health or any other specific health concerns they report: none    GYN History:     *Patient's GYN Practice: [facility]    *s/p hysterectomy    *Previous pregnancies: 2.  Live births: 1.  Miscarriages: 1 . Elective abortions: 0.      Health Maintenance Summary          Overdue - COVID-19 Vaccine (1) Overdue since 5/4/1961    10/26/2021  Imm Admin: COVID-19 (MODERNA) BOOSTER    03/01/2021  Imm Admin: COVID-19 (MODERNA) BOOSTER    02/01/2021  Imm Admin: COVID-19 (MODERNA) BOOSTER          Overdue - PAP SMEAR (Every 3 Years) Overdue since 2/4/2019 02/04/2016  Reason  not specified          Overdue - DIABETIC FOOT EXAM (Yearly) Overdue since 7/18/2019 07/18/2018  SmartData: WORKFLOW - DIABETES - DIABETIC FOOT EXAM PERFORMED    07/18/2018  SmartData: FINDINGS - TESTS - NEUROLOGY - MONOFILAMENT TEST - MONOFILAMENT TEST PERFORMED    07/05/2018  Done    07/05/2018  SmartData: WORKFLOW - DIABETES - DIABETIC FOOT EXAM PERFORMED    10/04/2016  SmartData: WORKFLOW - DIABETES - DIABETIC FOOT EXAM PERFORMED    Only the first 5 history entries have been loaded, but more history exists.          Overdue - ZOSTER VACCINE (2 of 2) Overdue since 12/1/2020    10/06/2020  Imm Admin: Shingrix          Ordered - URINE MICROALBUMIN (Yearly) Ordered on 5/22/2023 11/09/2021  MicroAlbumin, Urine, Random -    11/16/2020  MicroAlbumin, Urine, Random -    07/05/2019  MicroAlbumin, Urine, Random -    04/05/2019  MicroAlbumin, Urine, Random -    01/11/2019  Microalbumin, Urine component of MicroAlbumin, Urine, Random - Urine, Clean Catch    Only the first 5 history entries have been loaded, but more history exists.          HEMOGLOBIN A1C (Every 6 Months) Next due on 7/19/2023 01/19/2023  POCT GLYCOSYLATED HEMOGLOBIN (HGB A1C)    09/15/2022  POCT GLYCOSYLATED HEMOGLOBIN (HGB A1C)    06/09/2022  POCT GLYCOSYLATED HEMOGLOBIN (HGB A1C)    02/22/2022  POCT GLYCOSYLATED HEMOGLOBIN (HGB A1C)    11/09/2021  POCT GLYCOSYLATED HEMOGLOBIN (HGB A1C)    Only the first 5 history entries have been loaded, but more history exists.          INFLUENZA VACCINE (Yearly - August to March) Next due on 8/1/2023    10/19/2022  Imm Admin: Influenza Injectable Mdck Pf Quad    10/19/2022  Imm Admin: Influenza, Unspecified    10/10/2021  Imm Admin: Flu Vaccine Split Quad    10/10/2021  Imm Admin: Influenza, Unspecified    10/10/2021  Imm Admin: FluLaval/Fluzone >6mos    Only the first 5 history entries have been loaded, but more history exists.          DIABETIC EYE EXAM (Yearly) Next due on 10/18/2023    10/18/2022  SCANNED  "- EYE EXAM    09/09/2021  SCANNED - EYE EXAM    03/13/2019  SCANNED - EYE EXAM    02/14/2018  SCANNED - EYE EXAM    07/07/2016  Reason not specified          LIPID PANEL (Yearly) Next due on 1/19/2024 01/19/2023  LIPID PANEL    09/15/2022  LIPID PANEL    06/09/2022  LIPID PANEL    02/22/2022  Outside Procedure: CHG LIPID PANEL    03/31/2021  LIPID PANEL    Only the first 5 history entries have been loaded, but more history exists.          MAMMOGRAM (Every 2 Years) Next due on 3/3/2024    03/03/2022  SCANNED - MAMMO    02/04/2016  Reason not specified          DXA SCAN (Every 2 Years) Next due on 3/3/2024    03/03/2022  SCANNED - DEXA          ANNUAL PHYSICAL (Yearly) Next due on 5/22/2024 05/22/2023  Done    05/19/2022  Done          Pneumococcal Vaccine 0-64 (3 - PPSV23 if available, else PCV20) Next due on 11/4/2025    10/04/2019  Imm Admin: Pneumococcal Polysaccharide (PPSV23)    11/11/2013  Imm Admin: Pneumococcal Conjugate 13-Valent (PCV13)    11/11/2013  Imm Admin: Pneumococcal, Unspecified          TDAP/TD VACCINES (2 - Td or Tdap) Next due on 11/24/2025 11/24/2015  Imm Admin: Tdap          COLORECTAL CANCER SCREENING (COLONOSCOPY - Every 7 Years) Next due on 7/15/2029    07/15/2022  COLONOSCOPY    07/15/2022  Surgical Procedure: COLONOSCOPY    06/15/2018  Surgical Procedure: COLONOSCOPY    06/15/2018  COLONOSCOPY    05/05/2011  COLONOSCOPY (Reason not specified)          HEPATITIS C SCREENING  Completed    07/05/2019  HEPATITIS C ANTIBODY    10/04/2016  Declined                 Allergies   Allergen Reactions   • Celecoxib Hives   • Methocarbamol Hives     ROBAXIN    • Montelukast Hives   • Penicillins Hives   • Carbomer Other (See Comments)     \"Acrylic\" - unknown reaction   • [place] Other (See Comments)     UNKNOWN PER PT/POSITIVE ALLERGY TESTING   • Dulaglutide GI Intolerance     Abdominal pain, constipation   • Gemifloxacin Hives   • Grapefruit Itching   • Nickel Itching     WHEN PT TOOK THE " ALLERGY TEST NICKEL, ACRYLIC AND COBALT CAME UP AS ALLERGIES   • Watermelon [Citrullus Vulgaris] Itching   • Tamiflu [Oseltamivir Phosphate] Rash        Outpatient Medications Marked as Taking for the 5/22/23 encounter (Office Visit) with Shun Miranda DO   Medication Sig Dispense Refill   • citalopram (CeleXA) 40 MG tablet Take 1 tablet by mouth Every Morning. 90 tablet 1   • Continuous Blood Gluc Sensor (FreeStyle Mar 14 Day Sensor) misc APPLY 1 EACH TOPICALLY EVERY 14 (FOURTEEN) DAYS.     • empagliflozin (JARDIANCE) 25 MG tablet tablet Take 1 tablet by mouth Every Morning.     • fenofibrate 160 MG tablet TAKE 1 TABLET BY MOUTH EVERY DAY (Patient taking differently: Take 1 tablet by mouth Every Morning.) 90 tablet 1   • fexofenadine (ALLEGRA) 180 MG tablet Take 1 tablet by mouth Every Night.     • levothyroxine (SYNTHROID, LEVOTHROID) 100 MCG tablet TAKE 1 TABLET BY MOUTH DAILY FOR 1 DAY M-SAT AND NONE ON SUNDAYS.     • metFORMIN ER (GLUCOPHAGE-XR) 500 MG 24 hr tablet Take 2 tablets by mouth 2 (Two) Times a Day.     • metoprolol succinate XL (TOPROL-XL) 50 MG 24 hr tablet Take 1 tablet by mouth Every Morning. 90 tablet 1   • rosuvastatin (CRESTOR) 20 MG tablet TAKE 1 TABLET BY MOUTH EVERY DAY (Patient taking differently: Take 1 tablet by mouth Every Morning.) 30 tablet 0   • Semaglutide, 1 MG/DOSE, (OZEMPIC) 2 MG/1.5ML solution pen-injector Inject  under the skin into the appropriate area as directed 1 (One) Time Per Week.     • vitamin D (ERGOCALCIFEROL) 92051 UNITS capsule capsule Take 1 capsule by mouth Every 7 (Seven) Days. MONDAYS     • [DISCONTINUED] metoprolol succinate XL (TOPROL-XL) 50 MG 24 hr tablet TAKE 1 TABLET BY MOUTH EVERY DAY**NEEDS TO MAKE APPT** (Patient taking differently: Take 1 tablet by mouth Every Morning.) 90 tablet 0        Past Medical History:   Diagnosis Date   • Anxiety    • Arthritis    • Cataract     bilateral   • GERD (gastroesophageal reflux disease)    • H/O colonoscopy 08/2012    • History of kidney stones    • History of migraine    • Hyperlipidemia    • Hypertension    • Low back pain    • Nonproliferative diabetic retinopathy    • Osteopenia    • PONV (postoperative nausea and vomiting)    • Rotator cuff injury 2020    RIGHT   • Thyroid disease     now hypothyroidism s/p thyroidectomy   • Type 2 diabetes mellitus with hyperglycemia, without long-term current use of insulin      Past Surgical History:   Procedure Laterality Date   • APPENDECTOMY     • BREAST BIOPSY Right     BENIGN   •  SECTION     • CHOLECYSTECTOMY     • COLONOSCOPY N/A 06/15/2018    Procedure: COLONOSCOPY INTO CECUM AND T.I. WITH BIOPSIES AND COLD BIOPSY POLYPECTOMY;  Surgeon: Sunil Cardona MD;  Location: [facility] ENDOSCOPY;  Service: Gastroenterology   • COLONOSCOPY N/A 7/15/2022    Procedure: COLONOSCOPY to cecum with cold snare polypectomy;  Surgeon: Sunil Cardona MD;  Location: [facility] ENDOSCOPY;  Service: Gastroenterology;  Laterality: N/A;  pre- hx colon polyps   post- polyp    • DILATATION AND CURETTAGE     • HYSTERECTOMY  2007    OVARIAN MASS/SMALL INTESTINE   • KNEE SURGERY Left     meniscal surgery   • LASIK Bilateral    • OVARIAN CYST REMOVAL     • RHINOPLASTY     • SHOULDER ARTHROSCOPY W/ ROTATOR CUFF REPAIR Right 2021    Procedure: SHOULDER ARTHROSCOPY WITH ROTATOR CUFF REPAIR, subacromial decompression;  Surgeon: Juan Pablo Major MD;  [place]  [facility] [place]  Service: [place]opedics;  Laterality: Right;   • THYROIDECTOMY  2016     Family History   Problem Relation Age of Onset   • COPD Mother    • Hyperlipidemia Mother    • Hypertension Mother    • Melanoma Mother    • Heart failure Mother    • Transient ischemic attack Mother    • Colon cancer Father 80   • Thyroid disease Sister    • Depression Sister    • Bipolar disorder Sister    • Heart attack Brother 63   • Malig Hyperthermia Neg Hx     reports that she has never smoked. She has been exposed to tobacco smoke. She  "has never used smokeless tobacco. She reports that she does not currently use alcohol. She reports that she does not use drugs.    Immunization History   Administered Date(s) Administered   • COVID-19 (MODERNA) Monovalent Original Booster 02/01/2021, 03/01/2021, 10/26/2021   • Flu Vaccine Split Quad 10/10/2021   • FluLaval/Fluzone >6mos 10/02/2015, 10/20/2017, 12/02/2018, 10/04/2019, 10/10/2021   • FluMist 2-49yrs 10/02/2015   • Flucelvax Quad Vial =>4yrs 10/04/2020   • Fluzone Quad >6mos (Multi-dose) 10/04/2016, 12/02/2018   • Hepatitis A 12/02/2018   • Influenza Injectable Mdck Pf Quad 10/19/2022   • Influenza LAIV (Nasal) 10/02/2015   • Influenza Quad Vaccine (Inpatient) 10/04/2016, 12/02/2018   • Influenza Split Preservative Free ID 10/04/2016, 12/02/2018   • Influenza, Unspecified 10/02/2015, 10/20/2017, 12/02/2018, 10/04/2019, 10/10/2021, 10/19/2022   • Pneumococcal Conjugate 13-Valent (PCV13) 11/11/2013   • Pneumococcal Polysaccharide (PPSV23) 10/04/2019   • Pneumococcal, Unspecified 11/11/2013   • Shingrix 10/06/2020   • Tdap 11/24/2015        OBJECTIVE    Vital Signs:   /60   Pulse 72   Temp 97.3 °F (36.3 °C) (Infrared)   Ht 154.9 cm (61\")   Wt 77.6 kg (171 lb)   SpO2 98%   BMI 32.31 kg/m²     Physical Exam  Vitals reviewed.   Constitutional:       General: She is not in acute distress.     Appearance: Normal appearance. She is obese. She is not ill-appearing.   HENT:      Head: Normocephalic and atraumatic.      Right Ear: Tympanic membrane, ear canal and external ear normal. There is no impacted cerumen.      Left Ear: Tympanic membrane, ear canal and external ear normal. There is no impacted cerumen.      Mouth/Throat:      Mouth: Mucous membranes are moist.      Pharynx: No oropharyngeal exudate or posterior oropharyngeal erythema.   Eyes:      General: No scleral icterus.     Extraocular Movements: Extraocular movements intact.      Conjunctiva/sclera: Conjunctivae normal.      Pupils: " Pupils are equal, round, and reactive to light.   Cardiovascular:      Rate and Rhythm: Normal rate and regular rhythm.      Heart sounds: Normal heart sounds. No murmur heard.  Pulmonary:      Effort: Pulmonary effort is normal. No respiratory distress.      Breath sounds: Normal breath sounds. No wheezing.   Abdominal:      General: Bowel sounds are normal. There is no distension.      Palpations: Abdomen is soft.      Tenderness: There is no abdominal tenderness. There is no guarding.   Musculoskeletal:      Cervical back: Neck supple. No tenderness.      Right lower leg: No edema.      Left lower leg: No edema.   Lymphadenopathy:      Cervical: No cervical adenopathy.   Skin:     General: Skin is warm and dry.      Coloration: Skin is not jaundiced.          Neurological:      General: No focal deficit present.      Mental Status: She is alert and oriented to person, place, and time.      Cranial Nerves: No cranial nerve deficit.      Motor: No weakness.   Psychiatric:         Mood and Affect: Mood normal.         Behavior: Behavior normal.         Thought Content: Thought content normal.                         ASSESSMENT & PLAN     #Annual Preventative Health Examination   -Age and sex appropriate physical exam performed and documented. Updated past medical, family, social and surgical histories as well as allergies and care team list. Addressed care gaps listed in the medical record.  -Encouraged annual dental and vision exams as part of their overall health.  -Encouraged minimum of 30 minutes or more of exercise at a brisk walk or higher 5 days per week combined with a well-balanced diet.  -Immunizations reviewed and updated in EMR.  -Lipid screening:  Patient is already on statin therapy.   -Aspirin for primary or secondary prevention: Not applicable, patient is greater than age 60 and risks outweigh benefits for primary prevention.  -Depression and Anxiety screening: Patient has known diagnosis of  depression.  -Diabetes screening:  Patient already has a diagnosis of diabetes mellitus and is being treated.   -Tobacco use screening: Patient denies cigarette use. Tobacco counseling was was not indicated.  -Alcohol use screening: Patient reports drinking 0-1 drinks per week.. Alcohol abuse counseling was was not indicated.  -Illicit drug screening: Patient does not use illicit drugs.  -Hypertension screening: Patient with known diagnosis of hypertension and is receiving treatment.  -HIV screening: Discussed with patient the importance of identifying asymptomatic HIV infection for adults in their age group with a one time screening test .Patient declined screening.   -Syphilis screening: Syphilis screening not indicated.  -Hepatitis B virus screening: Screening not indicated, not in a high-risk group.  -Hepatitis C virus screening:  Patient has already completed Hepatitis C screening. Negative screening on file.   -Colon cancer screening: Patient is already up to date on their colon cancer screening with colonoscopy is indicated again in 2029  -Lung cancer screening: Patient has never smoked.  -Cervical cancer screening:  Will attempt to obtain PAP results   -Breast cancer screening:  Will attempt to obtain mammo results   -BRCA related cancer screening: Informed patient that the USPSTF recommends that primary care clinicians assess women with a personal or family history of breast, ovarian, tubal, or peritoneal cancer or who have an ancestry associated with breast cancer susceptibility 1 and 2 (BRCA1/2) gene mutations with an appropriate brief familial risk assessment tool and referred to genetic counseling if risk assessment is positive. Patient does not have a known personal or family history.   -Osteoporosis screening: known osteopenia    Follow up in 1 year for annual physical exam.    Patient/family had no further questions at this time and verbalized understanding of the plan discussed today.     A  problem-based visit was also conducted on the same day, see below for assessment and plan    Diagnoses and all orders for this visit:    1. Type 2 diabetes mellitus with right eye affected by mild nonproliferative retinopathy without macular edema, without long-term current use of insulin (Primary)  -Goal A1c for this patient is less than 6.5%  -Current diabetes regimen: goes to [facility]. She has a freestyle bran. Taking empagliflozin 25 mg daily, ozempic 1 mg weekly, metformin ER 1000 mg twice daily. Last A1c 6.5 Jan 2023. Follows with eye doctor regularly.   -I reviewed most recent [facility] progress note as well as vision exam note and CMP, TSH, A1c, Lipid profile from [facility]   -Changes made to diabetes regimen today: overall managed by [facility] and seems well controlled. Will update urine microalbuminuria screen. Consider ACEi if positive. I see no reason for her to not continue her current regimen at this time .          The following social determinates of health impact the patient's medical decision making: No social determinates of health were factored in to today's visit.     Follow Up  Return in about 6 months (around 11/22/2023) for Recheck.

## 2023-05-23 LAB
BASOPHILS # BLD AUTO: 0.12 10*3/MM3 (ref 0–0.2)
BASOPHILS NFR BLD AUTO: 1.1 % (ref 0–1.5)
EOSINOPHIL # BLD AUTO: 0.14 10*3/MM3 (ref 0–0.4)
EOSINOPHIL NFR BLD AUTO: 1.3 % (ref 0.3–6.2)
ERYTHROCYTE [DISTWIDTH] IN BLOOD BY AUTOMATED COUNT: 13.2 % (ref 12.3–15.4)
HCT VFR BLD AUTO: 43.7 % (ref 34–46.6)
HGB BLD-MCNC: 14.4 G/DL (ref 12–15.9)
IMM GRANULOCYTES # BLD AUTO: 0.02 10*3/MM3 (ref 0–0.05)
IMM GRANULOCYTES NFR BLD AUTO: 0.2 % (ref 0–0.5)
LYMPHOCYTES # BLD AUTO: 3.05 10*3/MM3 (ref 0.7–3.1)
LYMPHOCYTES NFR BLD AUTO: 28.3 % (ref 19.6–45.3)
MCH RBC QN AUTO: 27.1 PG (ref 26.6–33)
MCHC RBC AUTO-ENTMCNC: 33 G/DL (ref 31.5–35.7)
MCV RBC AUTO: 82.1 FL (ref 79–97)
MONOCYTES # BLD AUTO: 0.68 10*3/MM3 (ref 0.1–0.9)
MONOCYTES NFR BLD AUTO: 6.3 % (ref 5–12)
NEUTROPHILS # BLD AUTO: 6.78 10*3/MM3 (ref 1.7–7)
NEUTROPHILS NFR BLD AUTO: 62.8 % (ref 42.7–76)
NRBC BLD AUTO-RTO: 0 /100 WBC (ref 0–0.2)
PLATELET # BLD AUTO: 409 10*3/MM3 (ref 140–450)
RBC # BLD AUTO: 5.32 10*6/MM3 (ref 3.77–5.28)
UNABLE TO VOID: NORMAL
WBC # BLD AUTO: 10.79 10*3/MM3 (ref 3.4–10.8)

## 2023-06-13 RX ORDER — CITALOPRAM 40 MG/1
TABLET ORAL
Qty: 90 TABLET | Refills: 1 | Status: SHIPPED | OUTPATIENT
Start: 2023-06-13

## 2023-09-11 ENCOUNTER — OFFICE VISIT (OUTPATIENT)
Dept: ORTHOPEDIC SURGERY | Facility: CLINIC | Age: 63
End: 2023-09-11
Payer: COMMERCIAL

## 2023-09-11 VITALS — WEIGHT: 168.1 LBS | TEMPERATURE: 97.3 F | BODY MASS INDEX: 31.74 KG/M2 | HEIGHT: 61 IN

## 2023-09-11 DIAGNOSIS — S93.331A SUBLUXATION OF PERONEAL TENDON OF RIGHT FOOT: Primary | ICD-10-CM

## 2023-09-11 DIAGNOSIS — R52 PAIN: ICD-10-CM

## 2023-09-11 RX ORDER — ALBUTEROL SULFATE 90 UG/1
AEROSOL, METERED RESPIRATORY (INHALATION)
COMMUNITY
Start: 2023-08-12

## 2023-09-11 RX ORDER — SEMAGLUTIDE 1.34 MG/ML
INJECTION, SOLUTION SUBCUTANEOUS
COMMUNITY
Start: 2023-08-26

## 2023-09-11 RX ORDER — TRIAMCINOLONE ACETONIDE 1 MG/G
CREAM TOPICAL
COMMUNITY
Start: 2023-05-31

## 2023-09-11 RX ORDER — PREDNISONE 5 MG/1
TABLET ORAL
COMMUNITY
Start: 2023-07-31

## 2023-09-11 RX ORDER — BENZONATATE 100 MG/1
CAPSULE ORAL
COMMUNITY
Start: 2023-07-31

## 2023-09-11 NOTE — PROGRESS NOTES
"General Exam    Patient: Lynda Rueda    YOB: 1960    Medical Record Number: [id number]    Chief Complaints: Right ankle pain    History of Present Illness:     62 y.o. female patient who presents for evaluation of right ankle pain.  Patient states symptoms started about 6 weeks ago.  Things have been a lot worse.  Worse with activity as well as at night.  Pain laterally based about the ankle.  Denies any recent injuries.    Denies any numbness or tingling.  Denies any fevers, cough or shortness of breath.    Allergies:   Allergies   Allergen Reactions    Grapefruit Itching    Celecoxib Hives    Dulaglutide GI Intolerance     Abdominal pain, constipation    Methocarbamol Hives     ROBAXIN     Montelukast Hives    Penicillins Hives    Carbomer Other (See Comments)     \"Acrylic\" - unknown reaction    [place] Other (See Comments)     UNKNOWN PER PT/POSITIVE ALLERGY TESTING    Gemifloxacin Hives    Nickel Itching     WHEN PT TOOK THE ALLERGY TEST NICKEL, ACRYLIC AND COBALT CAME UP AS ALLERGIES    Tamiflu [Oseltamivir Phosphate] Rash    Watermelon [Citrullus Vulgaris] Itching       Home Medications:      Current Outpatient Medications:     albuterol sulfate  (90 Base) MCG/ACT inhaler, INHALE 1 PUFF INTO THE LUNGS EVERY 4 HOURS AS NEEDED FOR 7 DAYS, Disp: , Rfl:     citalopram (CeleXA) 40 MG tablet, TAKE 1 TABLET BY MOUTH EVERY DAY IN THE MORNING, Disp: 90 tablet, Rfl: 1    Continuous Blood Gluc Sensor (FreeStyle Mar 14 Day Sensor) misc, APPLY 1 EACH TOPICALLY EVERY 14 (FOURTEEN) DAYS., Disp: , Rfl:     empagliflozin (JARDIANCE) 25 MG tablet tablet, Take 1 tablet by mouth Every Morning., Disp: , Rfl:     fenofibrate 160 MG tablet, TAKE 1 TABLET BY MOUTH EVERY DAY (Patient taking differently: Take 1 tablet by mouth Every Morning.), Disp: 90 tablet, Rfl: 1    fexofenadine (ALLEGRA) 180 MG tablet, Take 1 tablet by mouth Every Night., Disp: , Rfl:     levothyroxine (SYNTHROID, LEVOTHROID) 100 MCG " tablet, TAKE 1 TABLET BY MOUTH DAILY FOR 1 DAY M-SAT AND NONE ON SUNDAYS., Disp: , Rfl:     metFORMIN ER (GLUCOPHAGE-XR) 500 MG 24 hr tablet, Take 2 tablets by mouth 2 (Two) Times a Day., Disp: , Rfl:     metoprolol succinate XL (TOPROL-XL) 50 MG 24 hr tablet, Take 1 tablet by mouth Every Morning., Disp: 90 tablet, Rfl: 1    Ozempic, 1 MG/DOSE, 4 MG/3ML solution pen-injector, , Disp: , Rfl:     rosuvastatin (CRESTOR) 20 MG tablet, TAKE 1 TABLET BY MOUTH EVERY DAY (Patient taking differently: Take 1 tablet by mouth Every Morning.), Disp: 30 tablet, Rfl: 0    vitamin D (ERGOCALCIFEROL) 76232 UNITS capsule capsule, Take 1 capsule by mouth Every 7 (Seven) Days. MONDAYS, Disp: , Rfl:     benzonatate (TESSALON) 100 MG capsule, 1-2 CAPSULE AS NEEDED ORALLY THREE TIMES A DAY 5 DAYS (Patient not taking: Reported on 9/11/2023), Disp: , Rfl:     fluconazole (DIFLUCAN) 150 MG tablet, TAKE 1 (ONE) TABLET 1 NOW AND REPEAT EVERY 3 DAYS (Patient not taking: Reported on 9/11/2023), Disp: , Rfl:     predniSONE (DELTASONE) 5 MG tablet, TAKE 6 TABLETS BY MOUTH DAILY X1 DAY THEN DECREASE BY 1 TABLET DAILY FOR 6 DAYS (Patient not taking: Reported on 9/11/2023), Disp: , Rfl:     Semaglutide, 1 MG/DOSE, (OZEMPIC) 2 MG/1.5ML solution pen-injector, Inject  under the skin into the appropriate area as directed 1 (One) Time Per Week. (Patient not taking: Reported on 9/11/2023), Disp: , Rfl:     triamcinolone (KENALOG) 0.1 % cream, APPLY TWICE DAILY TO RASH UP TO 2 WEEKS/MONTH AS NEEDED. (Patient not taking: Reported on 9/11/2023), Disp: , Rfl:     Past Medical History:   Diagnosis Date    Anxiety     Arthritis     Cataract     bilateral    GERD (gastroesophageal reflux disease)     H/O colonoscopy 08/2012    History of kidney stones     History of migraine     Hyperlipidemia     Hypertension     Low back pain     Nonproliferative diabetic retinopathy     Osteopenia     PONV (postoperative nausea and vomiting)     Rotator cuff injury 09/2020     RIGHT    Thyroid disease     now hypothyroidism s/p thyroidectomy    Type 2 diabetes mellitus with hyperglycemia, without long-term current use of insulin        Past Surgical History:   Procedure Laterality Date    APPENDECTOMY      BREAST BIOPSY Right     BENIGN     SECTION      CHOLECYSTECTOMY  1985    COLONOSCOPY N/A 06/15/2018    Procedure: COLONOSCOPY INTO CECUM AND T.I. WITH BIOPSIES AND COLD BIOPSY POLYPECTOMY;  Surgeon: Sunil Cardona MD;  Location:  [facility];  Service: Gastroenterology    COLONOSCOPY N/A 7/15/2022    Procedure: COLONOSCOPY to cecum with cold snare polypectomy;  Surgeon: Sunil Cardona MD;  Location:  [facility];  Service: Gastroenterology;  Laterality: N/A;  pre- hx colon polyps   post- polyp     DILATATION AND CURETTAGE      HYSTERECTOMY  2007    OVARIAN MASS/SMALL INTESTINE    KNEE SURGERY Left 2013    meniscal surgery    LASIK Bilateral     OVARIAN CYST REMOVAL  2004    RHINOPLASTY      SHOULDER ARTHROSCOPY W/ ROTATOR CUFF REPAIR Right 2021    Procedure: SHOULDER ARTHROSCOPY WITH ROTATOR CUFF REPAIR, subacromial decompression;  Surgeon: Juan Pablo Major MD;  Location:  [facility] [place];  Service: Orthopedics;  Laterality: Right;    THYROIDECTOMY  2016       Social History     Occupational History    Occupation:      Comment: [employer]   Tobacco Use    Smoking status: Never     Passive exposure: Past    Smokeless tobacco: Never   Vaping Use    Vaping Use: Never used   Substance and Sexual Activity    Alcohol use: Not Currently     Alcohol/week: 0.0 - 1.0 standard drinks    Drug use: Never    Sexual activity: Not on file      Social History     Social History Narrative    LIVES WITH SPOUSE       Family History   Problem Relation Age of Onset    COPD Mother     Hyperlipidemia Mother     Hypertension Mother     Melanoma Mother     Heart failure Mother     Transient ischemic attack Mother     Colon cancer Father 80    Thyroid disease Sister      "Depression Sister     Bipolar disorder Sister     Heart attack Brother 63    Malig Hyperthermia Neg Hx        Review of Systems:      Constitutional: Denies fever, shaking or chills         All other pertinent positives and negatives as noted above in HPI.    Physical Exam: 62 y.o. female    Vitals:    09/11/23 1106   Temp: 97.3 °F (36.3 °C)   TempSrc: Temporal   Weight: 76.2 kg (168 lb 1.6 oz)   Height: 154.9 cm (60.98\")       General:  Patient is awake and alert.  Appears in no acute distress or discomfort.      Musculoskeletal/Extremities:    Right lower extremity examined positive tenderness over the peroneals.  Plantarflexion dorsiflexion there is subluxation of the peroneal tendon     radiology:       3 views right ankle AP lateral oblique taken and reviewed to evaluate the patient's complaint/s.    Imaging shows some early degenerative changes in the ankle joint evidence of possible early osteophyte in the medial gutter.  Slight tilt of the talus noted under the plafond and.  Some degenerative changes at the distal tip of the fibula.  No acute fractures noted.     No imaging for comparison.    Assessment: Right ankle peroneal tendon subluxation      Plan:      Discussed the findings with the patient does have some degenerative changes noted but I worry more about tear of tendon regards to its subluxation.  This could be due to small tear versus tendinitis as well.  This time I feel MRI is warranted for further evaluation.  We will also place her in an ASO brace for comfort.  We will refer her to a foot and ankle specialist.           We will plan for follow up as above.    All questions were answered.  Patient understands and agrees with the plan.    Lj Rodriguez MD    09/11/2023    CC to Shun Miranda, DO      Answers submitted by the patient for this visit:  Other (Submitted on 9/11/2023)  Please describe your symptoms.: Right ankle on right side pain when walking and sleeping at night. Pain wraps around " right side and goes back towards heel. Front of ankle feels like there is catch when walking.  Have you had these symptoms before?: Yes  How long have you been having these symptoms?: Greater than 2 weeks  Please list any medications you are currently taking for this condition.: aleve  Please describe any probable cause for these symptoms. : I exercise and am on my feet about 50% of the day.  Primary Reason for Visit (Submitted on 9/11/2023)  What is the primary reason for your visit?: Other     yes

## 2023-09-20 ENCOUNTER — TELEPHONE (OUTPATIENT)
Dept: ORTHOPEDIC SURGERY | Facility: CLINIC | Age: 63
End: 2023-09-20

## 2023-09-20 NOTE — TELEPHONE ENCOUNTER
Caller: Lynda Rueda    Relationship: Self    Best call back number: [phone]    What orders are you requesting (i.e. lab or imaging): MRI RT ANKLE     In what timeframe would the patient need to come in: ASAP    Where will you receive your lab/imaging services: [facility] FAX # [phone]    Additional notes: PATIENT  EDSON DIAZ CALLED TO REQUEST NEW ORDER TO BE SENT TO [facility] PER PATIENT REQUEST. PATIENT IS SCHEDULED FOR THIS UP-COMING FRIDAY 09/22.

## 2023-09-25 ENCOUNTER — TELEPHONE (OUTPATIENT)
Dept: ORTHOPEDIC SURGERY | Facility: CLINIC | Age: 63
End: 2023-09-25

## 2023-10-02 ENCOUNTER — OFFICE VISIT (OUTPATIENT)
Dept: ORTHOPEDIC SURGERY | Facility: CLINIC | Age: 63
End: 2023-10-02
Payer: COMMERCIAL

## 2023-10-02 VITALS — TEMPERATURE: 96.4 F | HEIGHT: 61 IN | BODY MASS INDEX: 31.45 KG/M2 | WEIGHT: 166.6 LBS

## 2023-10-02 DIAGNOSIS — M72.2 PLANTAR FASCIITIS: ICD-10-CM

## 2023-10-02 DIAGNOSIS — M76.71 PERONEAL TENDONITIS, RIGHT: Primary | ICD-10-CM

## 2023-10-02 DIAGNOSIS — M19.079 ARTHRITIS OF ANKLE: ICD-10-CM

## 2023-10-02 DIAGNOSIS — S93.401S SPRAIN OF RIGHT ANKLE, UNSPECIFIED LIGAMENT, SEQUELA: ICD-10-CM

## 2023-10-02 NOTE — PROGRESS NOTES
"   New Patient Complaint      Patient: Lynda Rueda  YOB: 1960 62 y.o. 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  She had been doing Boot Camp for about 8 months up until July and may have done something to it during that and really has not had any since August.  Does report she had a history of [place] fasciitis 15 to 20 years ago was told she may have had a prior sprain.    She saw Dr. Rodriguez on 9/11/2023 and was fit with an ASO brace and sent for MRI    Patient is seen back today stating that the ASO brace has been somewhat more bothersome rubbing on the lateral aspect of her ankle.  She reports pain in the more posterior lateral and anterolateral aspect of the right ankle.  Symptoms are more along the peroneal tendons and along the tibiotalar joint.    HPI    Allergies:   Allergies   Allergen Reactions    Grapefruit Itching    Celecoxib Hives    Dulaglutide GI Intolerance     Abdominal pain, constipation    Methocarbamol Hives     ROBAXIN     Montelukast Hives    Penicillins Hives    Carbomer Other (See Comments)     \"Acrylic\" - unknown reaction    [place] Other (See Comments)     UNKNOWN PER PT/POSITIVE ALLERGY TESTING    Gemifloxacin Hives    Nickel Itching     WHEN PT TOOK THE ALLERGY TEST NICKEL, ACRYLIC AND COBALT CAME UP AS ALLERGIES    Tamiflu [Oseltamivir Phosphate] Rash    Watermelon [Citrullus Vulgaris] Itching       Medications:   Current Outpatient Medications on File Prior to Visit   Medication Sig    albuterol sulfate  (90 Base) MCG/ACT inhaler INHALE 1 PUFF INTO THE LUNGS EVERY 4 HOURS AS NEEDED FOR 7 DAYS    benzonatate (TESSALON) 100 MG capsule     citalopram (CeleXA) 40 MG tablet TAKE 1 TABLET BY MOUTH EVERY DAY IN THE MORNING    " Continuous Blood Gluc Sensor (FreeStyle Mar 14 Day Sensor) misc APPLY 1 EACH TOPICALLY EVERY 14 (FOURTEEN) DAYS.    empagliflozin (JARDIANCE) 25 MG tablet tablet Take 1 tablet by mouth Every Morning.    fenofibrate 160 MG tablet TAKE 1 TABLET BY MOUTH EVERY DAY (Patient taking differently: Take 1 tablet by mouth Every Morning.)    fexofenadine (ALLEGRA) 180 MG tablet Take 1 tablet by mouth Every Night.    levothyroxine (SYNTHROID, LEVOTHROID) 100 MCG tablet TAKE 1 TABLET BY MOUTH DAILY FOR 1 DAY M-SAT AND NONE ON SUNDAYS.    metFORMIN ER (GLUCOPHAGE-XR) 500 MG 24 hr tablet Take 2 tablets by mouth 2 (Two) Times a Day.    metoprolol succinate XL (TOPROL-XL) 50 MG 24 hr tablet Take 1 tablet by mouth Every Morning.    Ozempic, 1 MG/DOSE, 4 MG/3ML solution pen-injector     rosuvastatin (CRESTOR) 20 MG tablet TAKE 1 TABLET BY MOUTH EVERY DAY (Patient taking differently: Take 1 tablet by mouth Every Morning.)    triamcinolone (KENALOG) 0.1 % cream     vitamin D (ERGOCALCIFEROL) 73490 UNITS capsule capsule Take 1 capsule by mouth Every 7 (Seven) Days. MONDAYS    fluconazole (DIFLUCAN) 150 MG tablet  (Patient not taking: Reported on 10/2/2023)    predniSONE (DELTASONE) 5 MG tablet  (Patient not taking: Reported on 10/2/2023)    Semaglutide, 1 MG/DOSE, (OZEMPIC) 2 MG/1.5ML solution pen-injector Inject  under the skin into the appropriate area as directed 1 (One) Time Per Week. (Patient not taking: Reported on 10/2/2023)     No current facility-administered medications on file prior to visit.       Past Medical History:   Diagnosis Date    Anxiety     Arthritis     Cataract     bilateral    GERD (gastroesophageal reflux disease)     H/O colonoscopy 08/2012    History of kidney stones     History of migraine     Hyperlipidemia     Hypertension     Knee swelling     Low back pain     Nonproliferative diabetic retinopathy     Osteopenia     PONV (postoperative nausea and vomiting)     Rotator cuff injury 09/2020    RIGHT     Rotator cuff syndrome     Stress fracture foot    Tear of meniscus of knee     Thyroid disease     now hypothyroidism s/p thyroidectomy    Type 2 diabetes mellitus with hyperglycemia, without long-term current use of insulin      Past Surgical History:   Procedure Laterality Date    APPENDECTOMY      BREAST BIOPSY Right     BENIGN     SECTION      CHOLECYSTECTOMY  1985    COLONOSCOPY N/A 06/15/2018    Procedure: COLONOSCOPY INTO CECUM AND T.I. WITH BIOPSIES AND COLD BIOPSY POLYPECTOMY;  Surgeon: Sunil Cardona MD;  Location:  [facility];  Service: Gastroenterology    COLONOSCOPY N/A 07/15/2022    Procedure: COLONOSCOPY to cecum with cold snare polypectomy;  Surgeon: Sunil Cardona MD;  Location:  [facility];  Service: Gastroenterology;  Laterality: N/A;  pre- hx colon polyps   post- polyp     DILATATION AND CURETTAGE      HYSTERECTOMY  2007    OVARIAN MASS/SMALL INTESTINE    KNEE SURGERY Left 2013    meniscal surgery    LASIK Bilateral     OVARIAN CYST REMOVAL  2004    RHINOPLASTY      SHOULDER ARTHROSCOPY W/ ROTATOR CUFF REPAIR Right 2021    Procedure: SHOULDER ARTHROSCOPY WITH ROTATOR CUFF REPAIR, subacromial decompression;  Surgeon: Juan Pablo Major MD;  Location:  [facility] [place];  Service: Orthopedics;  Laterality: Right;    SHOULDER SURGERY  2021    THYROIDECTOMY  2016    TRIGGER POINT INJECTION       Social History     Occupational History    Occupation: [employer]     Comment: [employer]   Tobacco Use    Smoking status: Never     Passive exposure: Past    Smokeless tobacco: Never   Vaping Use    Vaping Use: Never used   Substance and Sexual Activity    Alcohol use: Not Currently     Comment: Only socially and usually  1 beer    Drug use: Never    Sexual activity: Yes     Partners: Male     Birth control/protection: Hysterectomy      Social History     Social History Narrative    LIVES WITH SPOUSE     Family History   Problem Relation Age of Onset    COPD Mother      "Hyperlipidemia Mother     Hypertension Mother     Melanoma Mother     Heart failure Mother     Transient ischemic attack Mother     Osteoporosis Mother     Scoliosis Mother     Colon cancer Father 80    Cancer Father         colon    Thyroid disease Sister     Depression Sister     Bipolar disorder Sister     Dislocations Sister         collar bone    Severe sprains Sister     Heart attack Brother 63    Diabetes Brother     Malig Hyperthermia Neg Hx        Review of Systems: 14 point review of systems performed, positive pertinent findings identified in HPI. All remaining systems negative     Review of Systems      Physical Exam:   Vitals:    10/02/23 0814   Temp: 96.4 °F (35.8 °C)   Weight: 75.6 kg (166 lb 9.6 oz)   Height: 154.9 cm (61\")   PainSc:   6     Physical Exam   Constitutional: pleasant, well developed   Eyes: sclera non icteric  Hearing : adequate for exam  Cardiovascular: palpable pulses in right foot, right calf/ thigh NT without sign of DVT  Respiratoy: breathing unlabored   Neurological: grossly sensate to LT throughout right LE  Psychiatric: oriented with normal mood and affect.   Lymphatic: No palpable popliteal lymphadenopathy right LE  Skin: intact throughout right leg/foot  Musculoskeletal: On exam she has moderate tenderness to palpation along the peroneal tendons along the lateral ankle.  She has some discomfort on the anterolateral intermittent structures with questionable anterior drawer with some guarding but no sulcus.  She had minimal discomfort with range of motion to the ankle  Physical Exam  Ortho Exam    Radiology: 3 views of the right ankle reviewed from 9/11/2023.  There is asymmetric arthritic change of the ankle.  There does appear to be some asymmetric widening medially which is somewhat ironic given her lack of pathology noted there on MRI as far as medial ligaments.  There is a bit of spurring over the tip of the fibula.    MRI films and report of the right ankle dated " 9/22/2023 compartment reviewed which showed developing ankle arthrosis with 1.6 cm focus of subchondral cystic change and edema along the anterior medial and lateral tibial plafond with corresponding joint space narrowing.  0.9 cm focus of subchondral cystic change and edema in the lateral talar dome likely corresponding to an area of moderate to high-grade chondromalacia.  Smaller submillimeter focus of subchondral edema of the posterior lateral talar dome also likely corresponding to focus of chondromalacia    Mild inframalleolar peroneus brevis and longus tendinosis and tenosynovitis without definitive tear    There is attenuation of the ATFL thought to be from sequelae of previous sprain and thickening of the CFL.  Syndesmosis and deltoid as well as spring ligament appear intact    There is minimal thickening of the central heel cord of the plantar fascia with trace amount of edema plantar calcaneus.    Assessment/Plan: 1.  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  Attenuation of the ATFL likely chronic small avulsion fragment and poor definition of the CFL likely sequelae of chronic tears  4.  Mild thickening of the central heel cord or plantar fascia suggesting mild plantar fasciitis    We discussed treatment going forward and reviewed to her that she may eventually need ankle replacement and she is uninterested in pursuing that at this time and understands I do not do that and would need to refer her to one of my colleagues.  We also discussed injection of her ankle but symptoms were bothersome enough for that at the tibiotalar joint    We discussed treatment for the lateral ligamentous  pathology and she has not really had feelings of instability and would not recommend surgical treatment at this time nor do recommend a surgical treatment for the peroneal pathology    We took the lateral stay out of her ASO and she was more comfortable with that also fitted with a lateral heel wedge.  She may try just lateral heel wedge first and if not then use the ASO as needed with modifications.    She will limit her activity as pain allows and avoid boot camp    She was prescribed compounding cream to apply the area several times daily we will get her started in physical therapy as well.    We will see her back in 4 to 6 weeks with x-rays of her ankle if she is having any increased pain.

## 2023-10-05 ENCOUNTER — TELEPHONE (OUTPATIENT)
Dept: ORTHOPEDIC SURGERY | Facility: CLINIC | Age: 63
End: 2023-10-05

## 2023-10-05 NOTE — TELEPHONE ENCOUNTER
Caller: NEERU ALANIZ    Relationship: SELF    Best call back number: [phone]    What orders are you requesting (i.e. lab or imaging): RIGHT ANKLE PHYSICAL THERAPY    In what timeframe would the patient need to come in: ASAP    Where will you receive your lab/imaging services: [facility] IN [place]    Additional notes: WAS GOING TO [facility] BUT IT DOES NOT SEEM TO BE WORKING OUT THERE

## 2023-10-10 NOTE — TELEPHONE ENCOUNTER
PATIENT CALLING TO CHECK STATUS OF PHYSICAL THERAPY ORDER/ REFERRAL. [facility] PLEASE CALL BACK TO UPDATE

## 2023-10-16 DIAGNOSIS — S93.331A SUBLUXATION OF PERONEAL TENDON OF RIGHT FOOT: ICD-10-CM

## 2023-11-01 ENCOUNTER — TELEPHONE (OUTPATIENT)
Dept: ORTHOPEDIC SURGERY | Facility: CLINIC | Age: 63
End: 2023-11-01

## 2023-11-01 NOTE — TELEPHONE ENCOUNTER
Caller: Lynda Rueda    Relationship to patient: Self    Best call back number:     Chief complaint: RT ANKLE    Type of visit: FOLLOW UP    Requested date: 11/30/23     If rescheduling, when is the original appointment: 11/6/23.  PATIENT HAS ONLY HAD 2 DAYS OF PHYSICAL THERAPY AND SHE HAS THE 30TH OFF SO WOULD LIKE TO RESCHEDULE IF POSSIBLE

## 2023-11-30 ENCOUNTER — OFFICE VISIT (OUTPATIENT)
Dept: INTERNAL MEDICINE | Facility: CLINIC | Age: 63
End: 2023-11-30
Payer: COMMERCIAL

## 2023-11-30 VITALS
BODY MASS INDEX: 30.78 KG/M2 | WEIGHT: 163 LBS | SYSTOLIC BLOOD PRESSURE: 100 MMHG | OXYGEN SATURATION: 97 % | HEIGHT: 61 IN | HEART RATE: 76 BPM | DIASTOLIC BLOOD PRESSURE: 70 MMHG

## 2023-11-30 DIAGNOSIS — F32.A ANXIETY AND DEPRESSION: ICD-10-CM

## 2023-11-30 DIAGNOSIS — E11.3291 TYPE 2 DIABETES MELLITUS WITH RIGHT EYE AFFECTED BY MILD NONPROLIFERATIVE RETINOPATHY WITHOUT MACULAR EDEMA, WITHOUT LONG-TERM CURRENT USE OF INSULIN: ICD-10-CM

## 2023-11-30 DIAGNOSIS — F41.9 ANXIETY AND DEPRESSION: ICD-10-CM

## 2023-11-30 DIAGNOSIS — I10 BENIGN ESSENTIAL HYPERTENSION: Primary | ICD-10-CM

## 2023-11-30 PROCEDURE — 99214 OFFICE O/P EST MOD 30 MIN: CPT | Performed by: STUDENT IN AN ORGANIZED HEALTH CARE EDUCATION/TRAINING PROGRAM

## 2023-11-30 RX ORDER — SEMAGLUTIDE 2.68 MG/ML
2 INJECTION, SOLUTION SUBCUTANEOUS WEEKLY
COMMUNITY
Start: 2023-10-31

## 2023-11-30 NOTE — PROGRESS NOTES
"  Shun Miranda D.O.  Internal Medicine  [facility]  [address], [address]  [place] [address]  [phone]      Chief Complaint  6 mos check-up    SUBJECTIVE    History of Present Illness    Lynda Rueda is a 63 y.o. female who presents to the office today as an established patient that last saw me on 5/22/2023.     HLD: taking rosuvastatin 20 mg daily and fenofibrate 160 mg daily      Anxiety and Depression: taking citalopram 40 mg daily, states this is doing well      HTN: taking metoprolol succinate 50 mg daily; she doesn't check her BP at home.      Allergies   Allergen Reactions    Grapefruit Itching    Celecoxib Hives    Dulaglutide GI Intolerance     Abdominal pain, constipation    Methocarbamol Hives     ROBAXIN     Montelukast Hives    Penicillins Hives    Carbomer Other (See Comments)     \"Acrylic\" - unknown reaction    [place] Other (See Comments)     UNKNOWN PER PT/POSITIVE ALLERGY TESTING    Gemifloxacin Hives    Nickel Itching     WHEN PT TOOK THE ALLERGY TEST NICKEL, ACRYLIC AND COBALT CAME UP AS ALLERGIES    Tamiflu [Oseltamivir Phosphate] Rash    Watermelon [Citrullus Vulgaris] Itching        Outpatient Medications Marked as Taking for the 11/30/23 encounter (Office Visit) with Shun Miranda, DO   Medication Sig Dispense Refill    citalopram (CeleXA) 40 MG tablet TAKE 1 TABLET BY MOUTH EVERY DAY IN THE MORNING 90 tablet 1    Continuous Blood Gluc Sensor (FreeStyle Mar 14 Day Sensor) misc APPLY 1 EACH TOPICALLY EVERY 14 (FOURTEEN) DAYS.      empagliflozin (JARDIANCE) 25 MG tablet tablet Take 1 tablet by mouth Every Morning.      fenofibrate 160 MG tablet TAKE 1 TABLET BY MOUTH EVERY DAY (Patient taking differently: Take 1 tablet by mouth Every Morning.) 90 tablet 1    fexofenadine (ALLEGRA) 180 MG tablet Take 1 tablet by mouth Every Night.      levothyroxine (SYNTHROID, LEVOTHROID) 100 MCG tablet TAKE 1 TABLET BY MOUTH DAILY FOR 1 DAY M-SAT AND NONE ON SUNDAYS.   " "   metFORMIN ER (GLUCOPHAGE-XR) 500 MG 24 hr tablet Take 2 tablets by mouth 2 (Two) Times a Day.      metoprolol succinate XL (TOPROL-XL) 50 MG 24 hr tablet Take 1 tablet by mouth Every Morning. 90 tablet 1    Ozempic, 2 MG/DOSE, 8 MG/3ML solution pen-injector Inject 2 mg under the skin into the appropriate area as directed 1 (One) Time Per Week.      rosuvastatin (CRESTOR) 20 MG tablet TAKE 1 TABLET BY MOUTH EVERY DAY (Patient taking differently: Take 1 tablet by mouth Every Morning.) 30 tablet 0    triamcinolone (KENALOG) 0.1 % cream       vitamin D (ERGOCALCIFEROL) 93381 UNITS capsule capsule Take 1 capsule by mouth Every 7 (Seven) Days. MONDAYS          Past Medical History:   Diagnosis Date    Anxiety     Arthritis     Cataract     bilateral    GERD (gastroesophageal reflux disease)     H/O colonoscopy 08/2012    History of kidney stones     History of migraine     Hyperlipidemia     Hypertension     Knee swelling     Low back pain     Nonproliferative diabetic retinopathy     Osteopenia     PONV (postoperative nausea and vomiting)     Rotator cuff injury 09/2020    RIGHT    Rotator cuff syndrome     Stress fracture foot    Tear of meniscus of knee     Thyroid disease     now hypothyroidism s/p thyroidectomy    Type 2 diabetes mellitus with hyperglycemia, without long-term current use of insulin        OBJECTIVE    Vital Signs:   /70   Pulse 76   Ht 154.9 cm (61\")   Wt 73.9 kg (163 lb)   SpO2 97%   BMI 30.80 kg/m²     Physical Exam  Vitals reviewed.   Constitutional:       General: She is not in acute distress.     Appearance: Normal appearance. She is obese. She is not ill-appearing.   HENT:      Head: Normocephalic and atraumatic.   Eyes:      General: No scleral icterus.  Cardiovascular:      Rate and Rhythm: Normal rate and regular rhythm.      Heart sounds: Normal heart sounds. No murmur heard.  Pulmonary:      Effort: Pulmonary effort is normal. No respiratory distress.      Breath sounds: " Normal breath sounds. No wheezing or rhonchi.   Musculoskeletal:      Right lower leg: No edema.      Left lower leg: No edema.   Skin:     Coloration: Skin is not jaundiced.   Neurological:      Mental Status: She is alert.   Psychiatric:         Mood and Affect: Mood normal.         Behavior: Behavior normal.         Thought Content: Thought content normal.                ASSESSMENT & PLAN     Diagnoses and all orders for this visit:    1. Benign essential hypertension (Primary)  -taking metoprolol succinate 50 mg daily; she doesn't check her BP at home.   -BP well controlled, 100/70 in office today  -continue current regimen  -CMP reviewed from [facility] 11/1/2023 and was normal    2. Anxiety and depression  -taking citalopram 40 mg daily, states this is doing well   -continue current regimen    3. Type 2 diabetes mellitus with right eye affected by mild nonproliferative retinopathy without macular edema, without long-term current use of insulin  -managed by [facility]  -11/2023 A1c was 7.5. As a result, her Ozempic was increased. I reviewed urine microalbumin/Cr ratio as well.   -Lipid profile from that date also reviewed and shows great control on current statin and fenofibrate   -recommend she get RSV vaccine           The following social determinates of health impact the patient's medical decision making: No social determinates of health were factored in to today's visit.     Follow Up  Return in about 6 months (around 5/30/2024) for Annual physical.    Patient/family had no further questions at this time and verbalized understanding of the plan discussed today.

## 2023-12-06 ENCOUNTER — OFFICE VISIT (OUTPATIENT)
Dept: ORTHOPEDIC SURGERY | Facility: CLINIC | Age: 63
End: 2023-12-06
Payer: COMMERCIAL

## 2023-12-06 VITALS — WEIGHT: 163.4 LBS | TEMPERATURE: 98.2 F | BODY MASS INDEX: 30.85 KG/M2 | HEIGHT: 61 IN

## 2023-12-06 DIAGNOSIS — M72.2 PLANTAR FASCIITIS: ICD-10-CM

## 2023-12-06 DIAGNOSIS — S93.401S SPRAIN OF RIGHT ANKLE, UNSPECIFIED LIGAMENT, SEQUELA: ICD-10-CM

## 2023-12-06 DIAGNOSIS — M76.71 PERONEAL TENDONITIS, RIGHT: ICD-10-CM

## 2023-12-06 DIAGNOSIS — M19.079 ARTHRITIS OF ANKLE: Primary | ICD-10-CM

## 2023-12-06 DIAGNOSIS — R52 PAIN: ICD-10-CM

## 2023-12-06 NOTE — PROGRESS NOTES
"Ankle Follow Up      Patient: Lynda Rueda    YOB: 1960 63 y.o. 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  She had been doing Boot Camp for about 8 months up until July and may have done something to it during that and really has not done any since August.  She did report she had a history of Planter fasciitis 15 to 20 years ago was told she may have had a prior sprain.     She  had seen Dr. Rodriguez on 9/11/2023 and was fitted with an ASO brace and sent for MRI     When I saw her on 10/2/2023 she reported that the ASO brace had been somewhat more bothersome rubbing on the lateral aspect of her ankle.  She reported pain in the more posterior lateral and anterolateral aspect of the right ankle.  Symptoms were more along the peroneal tendons and along the tibiotalar joint.    I reviewed her MRI and did not recommend any surgical treatment but reviewed she may eventually need ankle replacement and symptoms were bothersome enough for injection at that time reviewed treatment for lateral ligamentous pathology and she not really had feelings of instability and did not see any surgical peritoneal pathology at that time.  We took a lateral stand of her ASO which is more comfortable and she is also instructed on use of a heel wedge and limit activities and avoid boot camp    Patient is seen back today stating that she is not well.  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  She has seen  last week for another opinion who felt she may have subluxating peroneal tendons and was placed into a boot..  HPI    ROS: ankle pain  Past Medical History:   Diagnosis Date    Anxiety     Arthritis     Cataract " "    bilateral    GERD (gastroesophageal reflux disease)     H/O colonoscopy 08/2012    History of kidney stones     History of migraine     Hyperlipidemia     Hypertension     Knee swelling     Low back pain     Nonproliferative diabetic retinopathy     Osteopenia     PONV (postoperative nausea and vomiting)     Rotator cuff injury 09/2020    RIGHT    Rotator cuff syndrome     Stress fracture foot    Tear of meniscus of knee     Thyroid disease     now hypothyroidism s/p thyroidectomy    Type 2 diabetes mellitus with hyperglycemia, without long-term current use of insulin        Physical Exam:   Vitals:    12/06/23 1131   Temp: 98.2 °F (36.8 °C)   Weight: 74.1 kg (163 lb 6.4 oz)   Height: 154.9 cm (61\")   PainSc:   8     Well developed with normal mood.  On exam she does have some tenderness along the peroneal tendons but I could not really grossly elicit any gross subluxation.  She was somewhat tender anterolaterally with questionable anterior drawer.  She did have some mild discomfort with range of motion of the ankle.      Radiology: 3 views of the right ankle ordered evaluate pain and alignment reviewed and compared to previous x-rays.  I do not see any clear change compared to previous x-rays does seem little more narrow laterally than medially.  There is some chronic ossification of the distal tip of the fibula.    MRI films and report of the right ankle dated 9/22/2023 compartment reviewed which showed developing ankle arthrosis with 1.6 cm focus of subchondral cystic change and edema along the anterior medial and lateral tibial plafond with corresponding joint space narrowing.  0.9 cm focus of subchondral cystic change and edema in the lateral talar dome likely corresponding to an area of moderate to high-grade chondromalacia.  Smaller submillimeter focus of subchondral edema of the posterior lateral talar dome also likely corresponding to focus of chondromalacia     Mild inframalleolar peroneus brevis and " longus tendinosis and tenosynovitis without definitive tear     There is attenuation of the ATFL thought to be from sequelae of previous sprain and thickening of the CFL.  Syndesmosis and deltoid as well as spring ligament appear intact     There is minimal thickening of the central heel cord of the plantar fascia with trace amount of edema plantar calcaneus.      Assessment/Plan:  1.  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  Attenuation of the ATFL likely chronic small avulsion fragment and poor definition of the CFL likely sequelae of chronic tears  4.  Mild thickening of the central heel cord or plantar fascia suggesting mild plantar fasciitis    We discussed treatment going forward and not really see clear subluxation of the peroneal tendons does have some tendinopathy on MRI and could possibly have some subtle subluxation.  She does have previous ankle sprain but also developing arthropathy of the ankle joint itself.    Given her lack of improvement with conservative measures and complexity of her case I would like her to see Dr. Thomas for his evaluation for treatment going forward especially given that she may need an ankle replacement.  She was familiar with his practice and was provided with his name and number and referral was placed in [facility].  She was provided with a copy of her MRI report and she will make sure she has a copy of the disc and copies of x-rays for evaluation with him    Recommend she continue with her boot for least another 2 weeks or so and if symptoms are settling down may then resume use of her ASO and lateral heel wedge    I will see her back as needed

## 2023-12-19 RX ORDER — CITALOPRAM 40 MG/1
TABLET ORAL
Qty: 90 TABLET | Refills: 1 | Status: SHIPPED | OUTPATIENT
Start: 2023-12-19

## 2024-01-26 ENCOUNTER — TRANSCRIBE ORDERS (OUTPATIENT)
Dept: ADMINISTRATIVE | Facility: HOSPITAL | Age: 64
End: 2024-01-26
Payer: COMMERCIAL

## 2024-01-26 DIAGNOSIS — M25.571 RIGHT ANKLE PAIN, UNSPECIFIED CHRONICITY: Primary | ICD-10-CM

## 2024-02-06 ENCOUNTER — HOSPITAL ENCOUNTER (OUTPATIENT)
Dept: CT IMAGING | Facility: HOSPITAL | Age: 64
Discharge: HOME | End: 2024-02-06
Admitting: ORTHOPAEDIC SURGERY
Payer: COMMERCIAL

## 2024-02-06 DIAGNOSIS — M25.571 RIGHT ANKLE PAIN, UNSPECIFIED CHRONICITY: ICD-10-CM

## 2024-02-06 PROCEDURE — 73700 CT LOWER EXTREMITY W/O DYE: CPT

## 2024-02-14 RX ORDER — ROSUVASTATIN CALCIUM 20 MG/1
20 TABLET, COATED ORAL DAILY
Qty: 90 TABLET | Refills: 2 | Status: SHIPPED | OUTPATIENT
Start: 2024-02-14

## 2024-04-19 RX ORDER — CITALOPRAM 40 MG/1
TABLET ORAL
Qty: 90 TABLET | Refills: 1 | Status: SHIPPED | OUTPATIENT
Start: 2024-04-19

## 2024-06-06 ENCOUNTER — OFFICE VISIT (OUTPATIENT)
Dept: INTERNAL MEDICINE | Facility: CLINIC | Age: 64
End: 2024-06-06
Payer: COMMERCIAL

## 2024-06-06 VITALS
BODY MASS INDEX: 29.27 KG/M2 | WEIGHT: 155 LBS | HEIGHT: 61 IN | SYSTOLIC BLOOD PRESSURE: 100 MMHG | DIASTOLIC BLOOD PRESSURE: 70 MMHG | HEART RATE: 83 BPM | OXYGEN SATURATION: 99 %

## 2024-06-06 DIAGNOSIS — Z00.00 ANNUAL PHYSICAL EXAM: Primary | ICD-10-CM

## 2024-06-06 DIAGNOSIS — R23.3 EASY BRUISING: ICD-10-CM

## 2024-06-06 LAB
BASOPHILS # BLD AUTO: 0.06 10*3/MM3 (ref 0–0.2)
BASOPHILS NFR BLD AUTO: 0.8 % (ref 0–1.5)
EOSINOPHIL # BLD AUTO: 0.14 10*3/MM3 (ref 0–0.4)
EOSINOPHIL NFR BLD AUTO: 1.8 % (ref 0.3–6.2)
ERYTHROCYTE [DISTWIDTH] IN BLOOD BY AUTOMATED COUNT: 13 % (ref 12.3–15.4)
HCT VFR BLD AUTO: 43 % (ref 34–46.6)
HGB BLD-MCNC: 13.9 G/DL (ref 12–15.9)
IMM GRANULOCYTES # BLD AUTO: 0.02 10*3/MM3 (ref 0–0.05)
IMM GRANULOCYTES NFR BLD AUTO: 0.3 % (ref 0–0.5)
LYMPHOCYTES # BLD AUTO: 2.41 10*3/MM3 (ref 0.7–3.1)
LYMPHOCYTES NFR BLD AUTO: 30.3 % (ref 19.6–45.3)
MCH RBC QN AUTO: 26.8 PG (ref 26.6–33)
MCHC RBC AUTO-ENTMCNC: 32.3 G/DL (ref 31.5–35.7)
MCV RBC AUTO: 83 FL (ref 79–97)
MONOCYTES # BLD AUTO: 0.58 10*3/MM3 (ref 0.1–0.9)
MONOCYTES NFR BLD AUTO: 7.3 % (ref 5–12)
NEUTROPHILS # BLD AUTO: 4.75 10*3/MM3 (ref 1.7–7)
NEUTROPHILS NFR BLD AUTO: 59.5 % (ref 42.7–76)
NRBC BLD AUTO-RTO: 0 /100 WBC (ref 0–0.2)
PLATELET # BLD AUTO: 363 10*3/MM3 (ref 140–450)
RBC # BLD AUTO: 5.18 10*6/MM3 (ref 3.77–5.28)
WBC # BLD AUTO: 7.96 10*3/MM3 (ref 3.4–10.8)

## 2024-06-06 PROCEDURE — 99396 PREV VISIT EST AGE 40-64: CPT | Performed by: STUDENT IN AN ORGANIZED HEALTH CARE EDUCATION/TRAINING PROGRAM

## 2024-06-06 PROCEDURE — 99213 OFFICE O/P EST LOW 20 MIN: CPT | Performed by: STUDENT IN AN ORGANIZED HEALTH CARE EDUCATION/TRAINING PROGRAM

## 2024-06-06 RX ORDER — HYDROCHLOROTHIAZIDE 25 MG/1
1 TABLET ORAL DAILY
COMMUNITY
Start: 2024-04-04

## 2024-06-06 NOTE — PROGRESS NOTES
Shun Miranda D.O.  Internal Medicine  [facility]  [address], [address]  [place] [address]  [phone]    Chief Complaint  Annual checkup    HISTORY    Lynda Rueda is a 63 y.o. female who presents to the office today as a  an established patient for their annual preventative exam.     No hospitalization(s) within the last year.     Current exercise regimen: not currently exercising outside of walking    Status of chronic medical conditions:    Type 2 diabetes with diabetic retinopathy : goes to [facility]. She has a freestyle bran. Taking empagliflozin 25 mg daily, ozempic 2 mg weekly, metformin ER 1000 mg twice daily. Last A1c 6. 4/2024. Follows with eye doctor regularly.      HLD: taking rosuvastatin 20 mg daily and fenofibrate 160 mg daily      Anxiety and Depression: taking citalopram 40 mg daily, states this is doing well     Seasonal allergies: takes allegra as needed     Hypothyroidism: had a thyroidectomy due to nodules, denies thyroid cancer history, and is now on levothyroxine 100 mcg Mon-Sat, off on Sunday     HTN: taking metoprolol succinate 50 mg daily.     Vitamin D deficiency: takes vitamin D 50,000 units every other weekly     Osteopenia that has progressed to osteoporosis: follows with GYN [facility], taking vitamin D supplement as above. Now on HCTZ 25 mg daily per her GYN to retain calcium.     States she is going to have right ankle replacement soon.     Patient's overall comments about their health or any other specific health concerns they report:     -States she has been easier to bruise over the last few months over the ends of the arms and legs. Sometimes it can be from the dog jumping on her legs. No bleeding from the gums, stool, urine, or swelling of joints with blood.     GYN History:      *Patient's GYN Practice: [facility]     *s/p hysterectomy     *Previous pregnancies: 2.  Live births: 1.  Miscarriages: 1 . Elective  abortions: 0.      Health Maintenance Summary            Overdue - PAP SMEAR (Every 3 Years) Overdue since 2/4/2019 02/04/2016  Reason not specified              Overdue - DIABETIC FOOT EXAM (Yearly) Overdue since 7/5/2019 07/05/2018  Done    07/05/2018  SmartData: WORKFLOW - DIABETES - DIABETIC FOOT EXAM PERFORMED    07/05/2018  SmartData: WORKFLOW - DIABETES - DIABETIC FOOT EXAM PERFORMED    07/05/2018  SmartData: FINDINGS - TESTS - NEUROLOGY - MONOFILAMENT TEST - MONOFILAMENT TEST PERFORMED    10/04/2016  SmartData: WORKFLOW - DIABETES - DIABETIC FOOT EXAM PERFORMED    Only the first 5 history entries have been loaded, but more history exists.              Overdue - RSV Vaccine - Adults (1 - 1-dose 60+ series) Never done      No completion history exists for this topic.              Overdue - ZOSTER VACCINE (2 of 2) Overdue since 12/1/2020      10/06/2020  Imm Admin: Shingrix              Overdue - DIABETIC EYE EXAM (Yearly) Overdue since 10/18/2023      10/18/2022  SCANNED - EYE EXAM    09/09/2021  SCANNED - EYE EXAM    03/13/2019  SCANNED - EYE EXAM    02/14/2018  SCANNED - EYE EXAM    07/07/2016  Reason not specified    Only the first 5 history entries have been loaded, but more history exists.              Overdue - DXA SCAN (Every 2 Years) Overdue since 3/3/2024      03/03/2022  SCANNED - DEXA              INFLUENZA VACCINE (Yearly - August to March) Next due on 8/1/2024      10/06/2023  Imm Admin: Flublok 18+yrs    10/19/2022  Imm Admin: Influenza Injectable Mdck Pf Quad    10/19/2022  Imm Admin: Influenza, Unspecified    10/10/2021  Imm Admin: Flu Vaccine Split Quad    10/10/2021  Imm Admin: Influenza, Unspecified    Only the first 5 history entries have been loaded, but more history exists.              HEMOGLOBIN A1C (Every 6 Months) Next due on 10/11/2024      04/11/2024  Hemoglobin A1C component of HEMOGLOBIN A1C    11/01/2023  Hemoglobin A1C component of HEMOGLOBIN A1C    10/31/2023  POCT  GLYCOSYLATED HEMOGLOBIN (HGB A1C)    01/19/2023  POCT GLYCOSYLATED HEMOGLOBIN (HGB A1C)    09/15/2022  POCT GLYCOSYLATED HEMOGLOBIN (HGB A1C)    Only the first 5 history entries have been loaded, but more history exists.              BMI FOLLOWUP (Yearly) Next due on 10/31/2024      10/31/2023  Registry Metric: BMI Follow-up    05/19/2022  SmartData: WORKFLOW - QUALITY MEASUREMENT - BMI FOLLOW UP CARE PLAN DOCUMENTED    05/19/2022  SmartData: WORKFLOW - QUALITY MEASUREMENT - DOCUMENTED WEIGHT FOLLOW-UP PLAN    04/05/2018  SmartData: WORKFLOW - QUALITY MEASUREMENT - DOCUMENTED WEIGHT FOLLOW-UP PLAN    04/03/2017  SmartData: WORKFLOW - QUALITY MEASUREMENT - DOCUMENTED WEIGHT FOLLOW-UP PLAN    Only the first 5 history entries have been loaded, but more history exists.              URINE MICROALBUMIN (Yearly) Next due on 11/1/2024 11/01/2023  MicroAlbumin, Urine, Random -    11/09/2021  MicroAlbumin, Urine, Random -    11/16/2020  MicroAlbumin, Urine, Random -    07/05/2019  MicroAlbumin, Urine, Random -    04/05/2019  MicroAlbumin, Urine, Random -    Only the first 5 history entries have been loaded, but more history exists.              MAMMOGRAM (Every 2 Years) Next due on 3/16/2025      03/16/2023  SCANNED - MAMMO    03/03/2022  SCANNED - MAMMO    02/04/2016  Reason not specified              LIPID PANEL (Yearly) Next due on 4/11/2025 04/11/2024  LIPID PANEL    11/01/2023  LIPID PANEL    01/19/2023  LIPID PANEL    09/15/2022  LIPID PANEL    06/09/2022  LIPID PANEL    Only the first 5 history entries have been loaded, but more history exists.              ANNUAL PHYSICAL (Yearly) Next due on 6/6/2025 06/06/2024  Done    05/22/2023  Done    05/19/2022  Done              Pneumococcal Vaccine 0-64 (3 of 3 - PPSV23 or PCV20) Next due on 11/4/2025      10/04/2019  Imm Admin: Pneumococcal Polysaccharide (PPSV23)    11/11/2013  Imm Admin: Pneumococcal Conjugate 13-Valent (PCV13)    11/11/2013  Imm Admin:  "Pneumococcal, Unspecified              TDAP/TD VACCINES (2 - Td or Tdap) Next due on 11/24/2025 11/24/2015  Imm Admin: Tdap              COLORECTAL CANCER SCREENING (COLONOSCOPY - Every 7 Years) Next due on 7/15/2029      07/15/2022  COLONOSCOPY    07/15/2022  Surgical Procedure: COLONOSCOPY    06/15/2018  Surgical Procedure: COLONOSCOPY    06/15/2018  COLONOSCOPY    05/05/2011  COLONOSCOPY (Reason not specified)    Only the first 5 history entries have been loaded, but more history exists.              HEPATITIS C SCREENING  Completed      07/05/2019  HEPATITIS C ANTIBODY    10/04/2016  Declined              COVID-19 Vaccine (Series Information) Completed      10/06/2023  Imm Admin: COVID-19 F23 (PFIZER) 12YRS+ (COMIRNATY)    10/26/2021  Imm Admin: COVID-19 (MODERNA) Monovalent Original Booster    03/01/2021  Imm Admin: COVID-19 (MODERNA) Monovalent Original Booster    02/01/2021  Imm Admin: COVID-19 (MODERNA) Monovalent Original Booster                     Allergies   Allergen Reactions    Grapefruit Itching    Carbomer Other (See Comments) and Hives     \"Acrylic\" - unknown reaction    Celecoxib Hives    Dulaglutide GI Intolerance     Abdominal pain, constipation    Methocarbamol Hives     ROBAXIN     Montelukast Hives    Penicillins Hives    [place] Other (See Comments)     UNKNOWN PER PT/POSITIVE ALLERGY TESTING    Gemifloxacin Hives    Grapefruit Extract Itching    Nickel Itching     WHEN PT TOOK THE ALLERGY TEST NICKEL, ACRYLIC AND COBALT CAME UP AS ALLERGIES    Oseltamivir Rash    Pollen Extract Other (See Comments)    Tamiflu [Oseltamivir Phosphate] Rash    Watermelon [Citrullus Vulgaris] Itching        Outpatient Medications Marked as Taking for the 6/6/24 encounter (Office Visit) with Shun Miranda,    Medication Sig Dispense Refill    citalopram (CeleXA) 40 MG tablet TAKE 1 TABLET BY MOUTH EVERY DAY IN THE MORNING 90 tablet 1    Continuous Blood Gluc Sensor (FreeStyle Mar 14 Day Sensor) misc APPLY 1 " EACH TOPICALLY EVERY 14 (FOURTEEN) DAYS.      empagliflozin (JARDIANCE) 25 MG tablet tablet Take 1 tablet by mouth Every Morning.      fenofibrate 160 MG tablet TAKE 1 TABLET BY MOUTH EVERY DAY (Patient taking differently: Take 1 tablet by mouth Every Morning.) 90 tablet 1    fexofenadine (ALLEGRA) 180 MG tablet Take 1 tablet by mouth Every Night.      hydroCHLOROthiazide 25 MG tablet Take 1 tablet by mouth Daily.      levothyroxine (SYNTHROID, LEVOTHROID) 100 MCG tablet TAKE 1 TABLET BY MOUTH DAILY FOR 1 DAY -SAT AND NONE ON SUNDAYS.      metFORMIN ER (GLUCOPHAGE-XR) 500 MG 24 hr tablet Take 2 tablets by mouth 2 (Two) Times a Day.      metoprolol succinate XL (TOPROL-XL) 50 MG 24 hr tablet Take 1 tablet by mouth Every Morning. 90 tablet 1    Ozempic, 2 MG/DOSE, 8 MG/3ML solution pen-injector Inject 2 mg under the skin into the appropriate area as directed 1 (One) Time Per Week. Currently taking 3 mg      rosuvastatin (CRESTOR) 20 MG tablet Take 1 tablet by mouth Daily. 90 tablet 2    triamcinolone (KENALOG) 0.1 % cream       vitamin D (ERGOCALCIFEROL) 25460 UNITS capsule capsule Take 1 capsule by mouth Every 7 (Seven) Days.           Past Medical History:   Diagnosis Date    Anxiety     Arthritis     Cataract     bilateral    GERD (gastroesophageal reflux disease)     H/O colonoscopy 2012    History of kidney stones     History of migraine     Hyperlipidemia     Hypertension     Knee swelling     Low back pain     Nonproliferative diabetic retinopathy     Osteopenia     PONV (postoperative nausea and vomiting)     Rotator cuff injury 2020    RIGHT    Rotator cuff syndrome     Stress fracture foot    Tear of meniscus of knee     Thyroid disease     now hypothyroidism s/p thyroidectomy    Type 2 diabetes mellitus with hyperglycemia, without long-term current use of insulin      Past Surgical History:   Procedure Laterality Date    APPENDECTOMY      BREAST BIOPSY Right     BENIGN     SECTION       CHOLECYSTECTOMY  1985    COLONOSCOPY N/A 06/15/2018    Procedure: COLONOSCOPY INTO CECUM AND T.I. WITH BIOPSIES AND COLD BIOPSY POLYPECTOMY;  Surgeon: Sunil Cardona MD;  Location:  [facility];  Service: Gastroenterology    COLONOSCOPY N/A 07/15/2022    Procedure: COLONOSCOPY to cecum with cold snare polypectomy;  Surgeon: Sunil Cardona MD;  Location:  [facility];  Service: Gastroenterology;  Laterality: N/A;  pre- hx colon polyps   post- polyp     DILATATION AND CURETTAGE      HYSTERECTOMY  2007    OVARIAN MASS/SMALL INTESTINE    KNEE SURGERY Left 2013    meniscal surgery    LASIK Bilateral     OVARIAN CYST REMOVAL  2004    RHINOPLASTY      SHOULDER ARTHROSCOPY W/ ROTATOR CUFF REPAIR Right 02/09/2021    Procedure: SHOULDER ARTHROSCOPY WITH ROTATOR CUFF REPAIR, subacromial decompression;  Surgeon: Juan Pablo Major MD;  Location:  [facility];  Service: Orthopedics;  Laterality: Right;    SHOULDER SURGERY  02/2021    THYROIDECTOMY  07/2016    TRIGGER POINT INJECTION       Family History   Problem Relation Age of Onset    COPD Mother     Hyperlipidemia Mother     Hypertension Mother     Melanoma Mother     Heart failure Mother     Transient ischemic attack Mother     Osteoporosis Mother     Scoliosis Mother     Colon cancer Father 80    Cancer Father         colon    Thyroid disease Sister     Depression Sister     Bipolar disorder Sister     Dislocations Sister         collar bone    Severe sprains Sister     Heart attack Brother 63    Diabetes Brother     Malig Hyperthermia Neg Hx     reports that she has never smoked. She has been exposed to tobacco smoke. She has never used smokeless tobacco. She reports that she does not currently use alcohol. She reports that she does not use drugs.    Immunization History   Administered Date(s) Administered    COVID-19 (MODERNA) Monovalent Original Booster 02/01/2021, 03/01/2021, 10/26/2021    COVID-19 F23 (PFIZER) 12YRS+ (COMIRNATY) 10/06/2023    Flu Vaccine  "Split Quad 10/10/2021    FluMist 2-49yrs 10/02/2015    Flublok 18+yrs 10/06/2023    Flucelvax Quad Vial =>4yrs 10/04/2020    Fluzone (or Fluarix & Flulaval for VFC) >6mos 10/02/2015, 10/20/2017, 12/02/2018, 10/04/2019, 10/10/2021    Fluzone Quad >6mos (Multi-dose) 10/04/2016, 12/02/2018    Hepatitis A 12/02/2018    Influenza Injectable Mdck Pf Quad 10/19/2022    Influenza LAIV (Nasal) 10/02/2015    Influenza Quad Vaccine (Inpatient) 10/04/2016, 12/02/2018    Influenza Split Preservative Free ID 10/04/2016, 12/02/2018    Influenza, Unspecified 10/02/2015, 10/20/2017, 12/02/2018, 10/04/2019, 10/10/2021, 10/19/2022    Pneumococcal Conjugate 13-Valent (PCV13) 11/11/2013    Pneumococcal Polysaccharide (PPSV23) 10/04/2019    Pneumococcal, Unspecified 11/11/2013    Shingrix 10/06/2020    Tdap 11/24/2015        OBJECTIVE    Vital Signs:   /70   Pulse 83   Ht 154.9 cm (61\")   Wt 70.3 kg (155 lb)   SpO2 99%   BMI 29.29 kg/m²     Physical Exam  Vitals reviewed.   Constitutional:       General: She is not in acute distress.     Appearance: Normal appearance. She is not ill-appearing.   HENT:      Head: Normocephalic and atraumatic.      Right Ear: Tympanic membrane, ear canal and external ear normal. There is no impacted cerumen.      Left Ear: Tympanic membrane, ear canal and external ear normal. There is no impacted cerumen.      Mouth/Throat:      Mouth: Mucous membranes are moist.      Pharynx: No oropharyngeal exudate or posterior oropharyngeal erythema.   Eyes:      General: No scleral icterus.     Extraocular Movements: Extraocular movements intact.      Conjunctiva/sclera: Conjunctivae normal.      Pupils: Pupils are equal, round, and reactive to light.   Neck:     Cardiovascular:      Rate and Rhythm: Normal rate and regular rhythm.      Heart sounds: Normal heart sounds. No murmur heard.  Pulmonary:      Effort: Pulmonary effort is normal. No respiratory distress.      Breath sounds: Normal breath sounds. " No wheezing.   Abdominal:      General: Bowel sounds are normal. There is no distension.      Palpations: Abdomen is soft.      Tenderness: There is no abdominal tenderness. There is no guarding.   Musculoskeletal:      Cervical back: Neck supple.      Right lower leg: No edema.      Left lower leg: No edema.   Lymphadenopathy:      Cervical: No cervical adenopathy.   Skin:     General: Skin is warm and dry.      Coloration: Skin is not jaundiced.   Neurological:      General: No focal deficit present.      Mental Status: She is alert and oriented to person, place, and time.      Cranial Nerves: No cranial nerve deficit.      Motor: No weakness.   Psychiatric:         Mood and Affect: Mood normal.         Behavior: Behavior normal.         Thought Content: Thought content normal.                           ASSESSMENT & PLAN     #Annual Preventative Health Examination   -Age and sex appropriate physical exam performed and documented. Updated past medical, family, social and surgical histories as well as allergies and care team list. Addressed care gaps listed in the medical record.  -Encouraged annual dental and vision exams as part of their overall health.  -Encouraged minimum of 30 minutes or more of exercise at a brisk walk or higher 5 days per week combined with a well-balanced diet.  -Immunizations reviewed and updated in EMR. Shingrix and RSV (Respiratory Syncytial Virus) recommended.  -Lipid screening:  Patient is already on statin therapy..   -Aspirin for primary or secondary prevention: Not applicable, patient is greater than age 60 and risks outweigh benefits for primary prevention.  -Depression and Anxiety screening: Patient has known diagnosis of depression and / or anxiety.  -Diabetes screening:  Patient already has a diagnosis of diabetes mellitus and is being treated.   -Tobacco use screening: Conducted and addressed if indicated.   -Alcohol use screening: Conducted and addressed if indicated.    -Illicit drug screening: Conducted and addressed if indicated.   -Hypertension screening: Patient with known diagnosis of hypertension and is receiving treatment.  -HIV screening: pt declined screening  -Syphilis screening: Syphilis screening not indicated.  -Hepatitis B virus screening: Screening not indicated, not in a high-risk group.  -Hepatitis C virus screening:  Patient has already completed Hepatitis C screening. Negative screening on file.   -Colon cancer screening: Patient is already up to date on their colon cancer screening with colonoscopy is indicated again in 2029  -Lung cancer screening: Patient has never smoked.  -Cervical cancer screening:  Will attempt to obtain PAP results   -Breast cancer screening:  Will attempt to obtain mammo results   -BRCA related cancer screening: Informed patient that the USPSTF recommends that primary care clinicians assess women with a personal or family history of breast, ovarian, tubal, or peritoneal cancer or who have an ancestry associated with breast cancer susceptibility 1 and 2 (BRCA1/2) gene mutations with an appropriate brief familial risk assessment tool and referred to genetic counseling if risk assessment is positive. Patient does not have a known personal or family history.   -Osteoporosis screening: known osteoporosis     Follow up in 1 year for annual physical exam.    Patient/family had no further questions at this time and verbalized understanding of the plan discussed today.     A problem-based visit was also conducted on the same day, see below for assessment and plan    Diagnoses and all orders for this visit:    1. Easy bruising (Primary)  -States she has been easier to bruise over the last few months over the ends of the arms and legs. Sometimes it can be from the dog jumping on her legs. No bleeding from the gums, stool, urine, or swelling of joints with blood.   -she has a bruise on b/l lower legs , anteriorly on exam. She is also starting to  develop some crepe paper skin. Will check CBC but advised pt this is likely benign and age related. She exhibits no spontaneous or worrisome bleeding.  -     CBC & Differential            The following social determinates of health impact the patient's medical decision making: No social determinates of health were factored in to today's visit.     Follow Up  Return in about 6 months (around 12/6/2024) for Recheck.

## 2024-06-17 ENCOUNTER — PRE-ADMISSION TESTING (OUTPATIENT)
Dept: PREADMISSION TESTING | Facility: HOSPITAL | Age: 64
End: 2024-06-17
Payer: COMMERCIAL

## 2024-06-17 VITALS
OXYGEN SATURATION: 98 % | DIASTOLIC BLOOD PRESSURE: 71 MMHG | RESPIRATION RATE: 20 BRPM | BODY MASS INDEX: 29.64 KG/M2 | HEIGHT: 61 IN | SYSTOLIC BLOOD PRESSURE: 105 MMHG | TEMPERATURE: 98 F | HEART RATE: 86 BPM | WEIGHT: 157 LBS

## 2024-06-17 LAB
ANION GAP SERPL CALCULATED.3IONS-SCNC: 11.8 MMOL/L (ref 5–15)
BUN SERPL-MCNC: 13 MG/DL (ref 8–23)
BUN/CREAT SERPL: 18.1 (ref 7–25)
CALCIUM SPEC-SCNC: 9.9 MG/DL (ref 8.6–10.5)
CHLORIDE SERPL-SCNC: 101 MMOL/L (ref 98–107)
CO2 SERPL-SCNC: 26.2 MMOL/L (ref 22–29)
CREAT SERPL-MCNC: 0.72 MG/DL (ref 0.57–1)
DEPRECATED RDW RBC AUTO: 38.1 FL (ref 37–54)
EGFRCR SERPLBLD CKD-EPI 2021: 94.1 ML/MIN/1.73
ERYTHROCYTE [DISTWIDTH] IN BLOOD BY AUTOMATED COUNT: 12.8 % (ref 12.3–15.4)
GLUCOSE SERPL-MCNC: 159 MG/DL (ref 65–99)
HCT VFR BLD AUTO: 42.4 % (ref 34–46.6)
HGB BLD-MCNC: 13.9 G/DL (ref 12–15.9)
MCH RBC QN AUTO: 27.1 PG (ref 26.6–33)
MCHC RBC AUTO-ENTMCNC: 32.8 G/DL (ref 31.5–35.7)
MCV RBC AUTO: 82.8 FL (ref 79–97)
PLATELET # BLD AUTO: 366 10*3/MM3 (ref 140–450)
PMV BLD AUTO: 8.9 FL (ref 6–12)
POTASSIUM SERPL-SCNC: 3.5 MMOL/L (ref 3.5–5.2)
RBC # BLD AUTO: 5.12 10*6/MM3 (ref 3.77–5.28)
SODIUM SERPL-SCNC: 139 MMOL/L (ref 136–145)
WBC NRBC COR # BLD AUTO: 7.19 10*3/MM3 (ref 3.4–10.8)

## 2024-06-17 PROCEDURE — 93005 ELECTROCARDIOGRAM TRACING: CPT

## 2024-06-17 PROCEDURE — 36415 COLL VENOUS BLD VENIPUNCTURE: CPT

## 2024-06-17 PROCEDURE — 85027 COMPLETE CBC AUTOMATED: CPT

## 2024-06-17 PROCEDURE — 80048 BASIC METABOLIC PNL TOTAL CA: CPT

## 2024-06-17 RX ORDER — ESTRADIOL 0.1 MG/G
CREAM VAGINAL DAILY PRN
Status: ON HOLD | COMMUNITY
Start: 2024-06-11

## 2024-06-17 NOTE — DISCHARGE INSTRUCTIONS
Take the following medications the morning of surgery:    METOPROLOL, LEVOTHYROXINE, CITALOPRAM    If you are on prescription narcotic pain medication to control your pain you may also take that medication the morning of surgery.    General Instructions:  Do not eat solid food after midnight the night before surgery.  You may drink clear liquids day of surgery but must stop at least one hour before your hospital arrival time.  It is beneficial for you to have a clear drink that contains carbohydrates the day of surgery.  We suggest a 12 to 20 ounce bottle of Gatorade or Powerade for non-diabetic patients or a 12 to 20 ounce bottle of G2 or Powerade Zero for diabetic patients. (Pediatric patients, are not advised to drink a 12 to 20 ounce carbohydrate drink)    Clear liquids are liquids you can see through.  Nothing red in color.     Plain water                               Sports drinks  Sodas                                   Gelatin (Jell-O)  Fruit juices without pulp such as white grape juice and apple juice  Popsicles that contain no fruit or yogurt  Tea or coffee (no cream or milk added)  Gatorade / Powerade  G2 / Powerade Zero    Infants may have breast milk up to four hours before surgery.  Infants drinking formula may drink formula up to six hours before surgery.   Patients who avoid smoking, chewing tobacco and alcohol for 4 weeks prior to surgery have a reduced risk of post-operative complications.  Quit smoking as many days before surgery as you can.  Do not smoke, use chewing tobacco or drink alcohol the day of surgery.   If applicable bring your C-PAP/ BI-PAP machine in with you to preop day of surgery.  Bring any papers given to you in the doctor’s office.  Wear clean comfortable clothes.  Do not wear contact lenses, false eyelashes or make-up.  Bring a case for your glasses.   Bring crutches or walker if applicable.  Remove all piercings.  Leave jewelry and any other valuables at home.  Hair  extensions with metal clips must be removed prior to surgery.  The Pre-Admission Testing nurse will instruct you to bring medications if unable to obtain an accurate list in Pre-Admission Testing.          Preventing a Surgical Site Infection:  For 2 to 3 days before surgery, avoid shaving with a razor because the razor can irritate skin and make it easier to develop an infection.    Any areas of open skin can increase the risk of a post-operative wound infection by allowing bacteria to enter and travel throughout the body.  Notify your surgeon if you have any skin wounds / rashes even if it is not near the expected surgical site.  The area will need assessed to determine if surgery should be delayed until it is healed.  The night prior to surgery shower using a fresh bar of anti-bacterial soap (such as Dial) and clean washcloth.  Sleep in a clean bed with clean clothing.  Do not allow pets to sleep with you.  Shower on the morning of surgery using a fresh bar of anti-bacterial soap (such as Dial) and clean washcloth.  Dry with a clean towel and dress in clean clothing.  Ask your surgeon if you will be receiving antibiotics prior to surgery.  Make sure you, your family, and all healthcare providers clean their hands with soap and water or an alcohol based hand  before caring for you or your wound.    Day of surgery:  Your arrival time is approximately two hours before your scheduled surgery time.  Upon arrival, a Pre-op nurse and Anesthesiologist will review your health history, obtain vital signs, and answer questions you may have.  The only belongings needed at this time will be a list of your home medications and if applicable your C-PAP/BI-PAP machine.  A Pre-op nurse will start an IV and you may receive medication in preparation for surgery, including something to help you relax.     Please be aware that surgery does come with discomfort.  We want to make every effort to control your discomfort so  please discuss any uncontrolled symptoms with your nurse.   Your doctor will most likely have prescribed pain medications.      If you are going home after surgery you will receive individualized written care instructions before being discharged.  A responsible adult must drive you to and from the hospital on the day of your surgery and ideally stay with you through the night.   .  Discharge prescriptions can be filled by the hospital pharmacy during regular pharmacy hours.  If you are having surgery late in the day/evening your prescription may be e-prescribed to your pharmacy.  Please verify your pharmacy hours or chose a 24 hour pharmacy to avoid not having access to your prescription because your pharmacy has closed for the day.    If you are staying overnight following surgery, you will be transported to your hospital room following the recovery period.  [facility] has all private rooms.    If you have any questions please call Pre-Admission Testing at [phone].  Deductibles and co-payments are collected on the day of service. Please be prepared to pay the required co-pay, deductible or deposit on the day of service as defined by your plan.    Call your surgeon immediately if you experience any of the following symptoms:  Sore Throat  Shortness of Breath or difficulty breathing  Cough  Chills  Body soreness or muscle pain  Headache  Fever  New loss of taste or smell  Do not arrive for your surgery ill.  Your procedure will need to be rescheduled to another time.  You will need to call your physician before the day of surgery to avoid any unnecessary exposure to hospital staff as well as other patients.        CHLORHEXIDINE CLOTH INSTRUCTIONS  The morning of surgery follow these instructions using the Chlorhexidine cloths you've been given.  These steps reduce bacteria on the body.  Do not use the cloths near your eyes, ears mouth, genitalia or on open wounds.  Throw the cloths away after use  but do not try to flush them down a toilet.      Open and remove one cloth at a time from the package.    Leave the cloth unfolded and begin the bathing.  Massage the skin with the cloths using gentle pressure to remove bacteria.  Do not scrub harshly.   Follow the steps below with one 2% CHG cloth per area (6 total cloths).  One cloth for neck, shoulders and chest.  One cloth for both arms, hands, fingers and underarms (do underarms last).  One cloth for the abdomen followed by groin.  One cloth for right leg and foot including between the toes.  One cloth for left leg and foot including between the toes.  The last cloth is to be used for the back of the neck, back and buttocks.    Allow the CHG to air dry 3 minutes on the skin which will give it time to work and decrease the chance of irritation.  The skin may feel sticky until it is dry.  Do not rinse with water or any other liquid or you will lose the beneficial effects of the CHG.  If mild skin irritation occurs, do rinse the skin to remove the CHG.  Report this to the nurse at time of admission.  Do not apply lotions, creams, ointments, deodorants or perfumes after using the clothes. Dress in clean clothes before coming to the hospital.

## 2024-06-19 LAB
QT INTERVAL: 401 MS
QTC INTERVAL: 463 MS

## 2024-06-24 ENCOUNTER — HOSPITAL ENCOUNTER (OUTPATIENT)
Facility: HOSPITAL | Age: 64
Discharge: HOME | End: 2024-06-25
Attending: ORTHOPAEDIC SURGERY | Admitting: ORTHOPAEDIC SURGERY
Payer: COMMERCIAL

## 2024-06-24 ENCOUNTER — ANESTHESIA EVENT (OUTPATIENT)
Dept: PERIOP | Facility: HOSPITAL | Age: 64
End: 2024-06-24
Payer: COMMERCIAL

## 2024-06-24 ENCOUNTER — ANESTHESIA (OUTPATIENT)
Dept: PERIOP | Facility: HOSPITAL | Age: 64
End: 2024-06-24
Payer: COMMERCIAL

## 2024-06-24 ENCOUNTER — APPOINTMENT (OUTPATIENT)
Dept: GENERAL RADIOLOGY | Facility: HOSPITAL | Age: 64
End: 2024-06-24
Payer: COMMERCIAL

## 2024-06-24 DIAGNOSIS — M19.071 ARTHRITIS OF RIGHT ANKLE: Primary | ICD-10-CM

## 2024-06-24 PROBLEM — M19.079 ANKLE ARTHRITIS: Status: ACTIVE | Noted: 2024-06-24

## 2024-06-24 LAB
GLUCOSE BLDC GLUCOMTR-MCNC: 120 MG/DL (ref 70–130)
GLUCOSE BLDC GLUCOMTR-MCNC: 137 MG/DL (ref 70–130)
GLUCOSE BLDC GLUCOMTR-MCNC: 144 MG/DL (ref 70–130)
GLUCOSE BLDC GLUCOMTR-MCNC: 190 MG/DL (ref 70–130)
GLUCOSE BLDC GLUCOMTR-MCNC: 198 MG/DL (ref 70–130)

## 2024-06-24 PROCEDURE — C1776 JOINT DEVICE (IMPLANTABLE): HCPCS | Performed by: ORTHOPAEDIC SURGERY

## 2024-06-24 PROCEDURE — C1769 GUIDE WIRE: HCPCS | Performed by: ORTHOPAEDIC SURGERY

## 2024-06-24 PROCEDURE — 76000 FLUOROSCOPY <1 HR PHYS/QHP: CPT

## 2024-06-24 PROCEDURE — 25010000002 MEPIVACAINE PF 2 % SOLUTION 20 ML VIAL: Performed by: ANESTHESIOLOGY

## 2024-06-24 PROCEDURE — 25010000002 ROPIVACAINE PER 1 MG: Performed by: ANESTHESIOLOGY

## 2024-06-24 PROCEDURE — 82948 REAGENT STRIP/BLOOD GLUCOSE: CPT

## 2024-06-24 PROCEDURE — 25010000002 PROPOFOL 200 MG/20ML EMULSION: Performed by: STUDENT IN AN ORGANIZED HEALTH CARE EDUCATION/TRAINING PROGRAM

## 2024-06-24 PROCEDURE — 25010000002 CEFAZOLIN PER 500 MG: Performed by: ORTHOPAEDIC SURGERY

## 2024-06-24 PROCEDURE — C1713 ANCHOR/SCREW BN/BN,TIS/BN: HCPCS | Performed by: ORTHOPAEDIC SURGERY

## 2024-06-24 PROCEDURE — 25010000002 ONDANSETRON PER 1 MG: Performed by: STUDENT IN AN ORGANIZED HEALTH CARE EDUCATION/TRAINING PROGRAM

## 2024-06-24 PROCEDURE — 25010000002 PROPOFOL 10 MG/ML EMULSION: Performed by: STUDENT IN AN ORGANIZED HEALTH CARE EDUCATION/TRAINING PROGRAM

## 2024-06-24 PROCEDURE — 25010000002 FENTANYL CITRATE (PF) 50 MCG/ML SOLUTION: Performed by: ANESTHESIOLOGY

## 2024-06-24 PROCEDURE — 25010000002 DEXAMETHASONE PER 1 MG: Performed by: ANESTHESIOLOGY

## 2024-06-24 PROCEDURE — 25810000003 LACTATED RINGERS PER 1000 ML: Performed by: ANESTHESIOLOGY

## 2024-06-24 PROCEDURE — 25010000002 SUGAMMADEX 200 MG/2ML SOLUTION: Performed by: STUDENT IN AN ORGANIZED HEALTH CARE EDUCATION/TRAINING PROGRAM

## 2024-06-24 PROCEDURE — 25010000002 DEXAMETHASONE SODIUM PHOSPHATE 20 MG/5ML SOLUTION: Performed by: STUDENT IN AN ORGANIZED HEALTH CARE EDUCATION/TRAINING PROGRAM

## 2024-06-24 PROCEDURE — 25010000002 VANCOMYCIN 1 G RECONSTITUTED SOLUTION: Performed by: ORTHOPAEDIC SURGERY

## 2024-06-24 PROCEDURE — 25010000002 MIDAZOLAM PER 1 MG: Performed by: ANESTHESIOLOGY

## 2024-06-24 RX ORDER — VANCOMYCIN HYDROCHLORIDE 1 G/20ML
INJECTION, POWDER, LYOPHILIZED, FOR SOLUTION INTRAVENOUS AS NEEDED
Status: DISCONTINUED | OUTPATIENT
Start: 2024-06-24 | End: 2024-06-24 | Stop reason: HOSPADM

## 2024-06-24 RX ORDER — LABETALOL HYDROCHLORIDE 5 MG/ML
5 INJECTION, SOLUTION INTRAVENOUS
Status: DISCONTINUED | OUTPATIENT
Start: 2024-06-24 | End: 2024-06-24 | Stop reason: HOSPADM

## 2024-06-24 RX ORDER — FENTANYL CITRATE 50 UG/ML
50 INJECTION, SOLUTION INTRAMUSCULAR; INTRAVENOUS
Status: DISCONTINUED | OUTPATIENT
Start: 2024-06-24 | End: 2024-06-24 | Stop reason: HOSPADM

## 2024-06-24 RX ORDER — MIDAZOLAM HYDROCHLORIDE 1 MG/ML
1 INJECTION INTRAMUSCULAR; INTRAVENOUS
Status: DISCONTINUED | OUTPATIENT
Start: 2024-06-24 | End: 2024-06-24 | Stop reason: HOSPADM

## 2024-06-24 RX ORDER — NALOXONE HCL 0.4 MG/ML
0.2 VIAL (ML) INJECTION AS NEEDED
Status: DISCONTINUED | OUTPATIENT
Start: 2024-06-24 | End: 2024-06-24 | Stop reason: HOSPADM

## 2024-06-24 RX ORDER — FAMOTIDINE 10 MG/ML
20 INJECTION, SOLUTION INTRAVENOUS ONCE
Status: COMPLETED | OUTPATIENT
Start: 2024-06-24 | End: 2024-06-24

## 2024-06-24 RX ORDER — AMOXICILLIN 250 MG
2 CAPSULE ORAL 2 TIMES DAILY
Status: DISCONTINUED | OUTPATIENT
Start: 2024-06-24 | End: 2024-06-25 | Stop reason: HOSPADM

## 2024-06-24 RX ORDER — SODIUM CHLORIDE 0.9 % (FLUSH) 0.9 %
3-10 SYRINGE (ML) INJECTION AS NEEDED
Status: DISCONTINUED | OUTPATIENT
Start: 2024-06-24 | End: 2024-06-24 | Stop reason: HOSPADM

## 2024-06-24 RX ORDER — ROPIVACAINE HYDROCHLORIDE 5 MG/ML
INJECTION, SOLUTION EPIDURAL; INFILTRATION; PERINEURAL
Status: COMPLETED | OUTPATIENT
Start: 2024-06-24 | End: 2024-06-24

## 2024-06-24 RX ORDER — HYDROMORPHONE HYDROCHLORIDE 1 MG/ML
0.5 INJECTION, SOLUTION INTRAMUSCULAR; INTRAVENOUS; SUBCUTANEOUS
Status: DISCONTINUED | OUTPATIENT
Start: 2024-06-24 | End: 2024-06-24 | Stop reason: HOSPADM

## 2024-06-24 RX ORDER — HYDRALAZINE HYDROCHLORIDE 20 MG/ML
5 INJECTION INTRAMUSCULAR; INTRAVENOUS
Status: DISCONTINUED | OUTPATIENT
Start: 2024-06-24 | End: 2024-06-24 | Stop reason: HOSPADM

## 2024-06-24 RX ORDER — FENTANYL CITRATE 50 UG/ML
50 INJECTION, SOLUTION INTRAMUSCULAR; INTRAVENOUS ONCE AS NEEDED
Status: COMPLETED | OUTPATIENT
Start: 2024-06-24 | End: 2024-06-24

## 2024-06-24 RX ORDER — DEXAMETHASONE SODIUM PHOSPHATE 4 MG/ML
INJECTION, SOLUTION INTRA-ARTICULAR; INTRALESIONAL; INTRAMUSCULAR; INTRAVENOUS; SOFT TISSUE
Status: COMPLETED | OUTPATIENT
Start: 2024-06-24 | End: 2024-06-24

## 2024-06-24 RX ORDER — IPRATROPIUM BROMIDE AND ALBUTEROL SULFATE 2.5; .5 MG/3ML; MG/3ML
3 SOLUTION RESPIRATORY (INHALATION) ONCE AS NEEDED
Status: DISCONTINUED | OUTPATIENT
Start: 2024-06-24 | End: 2024-06-24 | Stop reason: HOSPADM

## 2024-06-24 RX ORDER — DIPHENHYDRAMINE HYDROCHLORIDE 50 MG/ML
12.5 INJECTION INTRAMUSCULAR; INTRAVENOUS
Status: DISCONTINUED | OUTPATIENT
Start: 2024-06-24 | End: 2024-06-24 | Stop reason: HOSPADM

## 2024-06-24 RX ORDER — OXYCODONE AND ACETAMINOPHEN 7.5; 325 MG/1; MG/1
1 TABLET ORAL EVERY 4 HOURS PRN
Status: DISCONTINUED | OUTPATIENT
Start: 2024-06-24 | End: 2024-06-24 | Stop reason: HOSPADM

## 2024-06-24 RX ORDER — ONDANSETRON 2 MG/ML
4 INJECTION INTRAMUSCULAR; INTRAVENOUS ONCE AS NEEDED
Status: DISCONTINUED | OUTPATIENT
Start: 2024-06-24 | End: 2024-06-24 | Stop reason: HOSPADM

## 2024-06-24 RX ORDER — BISACODYL 5 MG/1
5 TABLET, DELAYED RELEASE ORAL DAILY PRN
Status: DISCONTINUED | OUTPATIENT
Start: 2024-06-24 | End: 2024-06-25 | Stop reason: HOSPADM

## 2024-06-24 RX ORDER — DROPERIDOL 2.5 MG/ML
0.62 INJECTION, SOLUTION INTRAMUSCULAR; INTRAVENOUS
Status: DISCONTINUED | OUTPATIENT
Start: 2024-06-24 | End: 2024-06-24 | Stop reason: HOSPADM

## 2024-06-24 RX ORDER — ONDANSETRON 2 MG/ML
4 INJECTION INTRAMUSCULAR; INTRAVENOUS EVERY 6 HOURS PRN
Status: DISCONTINUED | OUTPATIENT
Start: 2024-06-24 | End: 2024-06-25 | Stop reason: HOSPADM

## 2024-06-24 RX ORDER — SODIUM CHLORIDE 0.9 % (FLUSH) 0.9 %
3 SYRINGE (ML) INJECTION EVERY 12 HOURS SCHEDULED
Status: DISCONTINUED | OUTPATIENT
Start: 2024-06-24 | End: 2024-06-24 | Stop reason: HOSPADM

## 2024-06-24 RX ORDER — LEVOTHYROXINE SODIUM 0.1 MG/1
100 TABLET ORAL
Status: DISCONTINUED | OUTPATIENT
Start: 2024-06-25 | End: 2024-06-25 | Stop reason: HOSPADM

## 2024-06-24 RX ORDER — BISACODYL 10 MG
10 SUPPOSITORY, RECTAL RECTAL DAILY PRN
Status: DISCONTINUED | OUTPATIENT
Start: 2024-06-24 | End: 2024-06-25 | Stop reason: HOSPADM

## 2024-06-24 RX ORDER — ROCURONIUM BROMIDE 10 MG/ML
INJECTION, SOLUTION INTRAVENOUS AS NEEDED
Status: DISCONTINUED | OUTPATIENT
Start: 2024-06-24 | End: 2024-06-24 | Stop reason: SURG

## 2024-06-24 RX ORDER — ONDANSETRON 2 MG/ML
INJECTION INTRAMUSCULAR; INTRAVENOUS AS NEEDED
Status: DISCONTINUED | OUTPATIENT
Start: 2024-06-24 | End: 2024-06-24 | Stop reason: SURG

## 2024-06-24 RX ORDER — POLYETHYLENE GLYCOL 3350 17 G/17G
17 POWDER, FOR SOLUTION ORAL DAILY PRN
Status: DISCONTINUED | OUTPATIENT
Start: 2024-06-24 | End: 2024-06-25 | Stop reason: HOSPADM

## 2024-06-24 RX ORDER — OXYCODONE AND ACETAMINOPHEN 7.5; 325 MG/1; MG/1
1 TABLET ORAL EVERY 4 HOURS PRN
Status: DISCONTINUED | OUTPATIENT
Start: 2024-06-24 | End: 2024-06-25 | Stop reason: HOSPADM

## 2024-06-24 RX ORDER — METFORMIN HYDROCHLORIDE 500 MG/1
1000 TABLET, EXTENDED RELEASE ORAL 2 TIMES DAILY
Status: DISCONTINUED | OUTPATIENT
Start: 2024-06-24 | End: 2024-06-25 | Stop reason: HOSPADM

## 2024-06-24 RX ORDER — HYDROCHLOROTHIAZIDE 25 MG/1
25 TABLET ORAL DAILY
Status: DISCONTINUED | OUTPATIENT
Start: 2024-06-24 | End: 2024-06-25 | Stop reason: HOSPADM

## 2024-06-24 RX ORDER — METOPROLOL SUCCINATE 50 MG/1
50 TABLET, EXTENDED RELEASE ORAL EVERY MORNING
Status: DISCONTINUED | OUTPATIENT
Start: 2024-06-25 | End: 2024-06-25 | Stop reason: HOSPADM

## 2024-06-24 RX ORDER — LIDOCAINE HYDROCHLORIDE 10 MG/ML
0.5 INJECTION, SOLUTION INFILTRATION; PERINEURAL ONCE AS NEEDED
Status: DISCONTINUED | OUTPATIENT
Start: 2024-06-24 | End: 2024-06-24 | Stop reason: HOSPADM

## 2024-06-24 RX ORDER — ASPIRIN 325 MG
325 TABLET, DELAYED RELEASE (ENTERIC COATED) ORAL DAILY
Status: DISCONTINUED | OUTPATIENT
Start: 2024-06-25 | End: 2024-06-25 | Stop reason: HOSPADM

## 2024-06-24 RX ORDER — HYDROCODONE BITARTRATE AND ACETAMINOPHEN 5; 325 MG/1; MG/1
1 TABLET ORAL ONCE AS NEEDED
Status: DISCONTINUED | OUTPATIENT
Start: 2024-06-24 | End: 2024-06-24 | Stop reason: HOSPADM

## 2024-06-24 RX ORDER — CITALOPRAM 40 MG/1
40 TABLET ORAL EVERY MORNING
Status: DISCONTINUED | OUTPATIENT
Start: 2024-06-25 | End: 2024-06-25 | Stop reason: HOSPADM

## 2024-06-24 RX ORDER — NALOXONE HCL 0.4 MG/ML
0.4 VIAL (ML) INJECTION
Status: DISCONTINUED | OUTPATIENT
Start: 2024-06-24 | End: 2024-06-25 | Stop reason: HOSPADM

## 2024-06-24 RX ORDER — SODIUM CHLORIDE 9 MG/ML
40 INJECTION, SOLUTION INTRAVENOUS AS NEEDED
Status: DISCONTINUED | OUTPATIENT
Start: 2024-06-24 | End: 2024-06-25 | Stop reason: HOSPADM

## 2024-06-24 RX ORDER — FLUMAZENIL 0.1 MG/ML
0.2 INJECTION INTRAVENOUS AS NEEDED
Status: DISCONTINUED | OUTPATIENT
Start: 2024-06-24 | End: 2024-06-24 | Stop reason: HOSPADM

## 2024-06-24 RX ORDER — PROMETHAZINE HYDROCHLORIDE 25 MG/1
25 TABLET ORAL ONCE AS NEEDED
Status: DISCONTINUED | OUTPATIENT
Start: 2024-06-24 | End: 2024-06-24 | Stop reason: HOSPADM

## 2024-06-24 RX ORDER — SODIUM CHLORIDE 0.9 % (FLUSH) 0.9 %
10 SYRINGE (ML) INJECTION EVERY 12 HOURS SCHEDULED
Status: DISCONTINUED | OUTPATIENT
Start: 2024-06-24 | End: 2024-06-25 | Stop reason: HOSPADM

## 2024-06-24 RX ORDER — SODIUM CHLORIDE, SODIUM LACTATE, POTASSIUM CHLORIDE, CALCIUM CHLORIDE 600; 310; 30; 20 MG/100ML; MG/100ML; MG/100ML; MG/100ML
9 INJECTION, SOLUTION INTRAVENOUS CONTINUOUS
Status: DISCONTINUED | OUTPATIENT
Start: 2024-06-24 | End: 2024-06-25 | Stop reason: HOSPADM

## 2024-06-24 RX ORDER — PROPOFOL 10 MG/ML
INJECTION, EMULSION INTRAVENOUS AS NEEDED
Status: DISCONTINUED | OUTPATIENT
Start: 2024-06-24 | End: 2024-06-24 | Stop reason: SURG

## 2024-06-24 RX ORDER — OXYCODONE AND ACETAMINOPHEN 7.5; 325 MG/1; MG/1
2 TABLET ORAL EVERY 4 HOURS PRN
Status: DISCONTINUED | OUTPATIENT
Start: 2024-06-24 | End: 2024-06-25 | Stop reason: HOSPADM

## 2024-06-24 RX ORDER — PHENYLEPHRINE HCL IN 0.9% NACL 1 MG/10 ML
SYRINGE (ML) INTRAVENOUS AS NEEDED
Status: DISCONTINUED | OUTPATIENT
Start: 2024-06-24 | End: 2024-06-24 | Stop reason: SURG

## 2024-06-24 RX ORDER — ROSUVASTATIN CALCIUM 20 MG/1
20 TABLET, COATED ORAL DAILY
Status: DISCONTINUED | OUTPATIENT
Start: 2024-06-24 | End: 2024-06-25 | Stop reason: HOSPADM

## 2024-06-24 RX ORDER — PROMETHAZINE HYDROCHLORIDE 25 MG/1
25 SUPPOSITORY RECTAL ONCE AS NEEDED
Status: DISCONTINUED | OUTPATIENT
Start: 2024-06-24 | End: 2024-06-24 | Stop reason: HOSPADM

## 2024-06-24 RX ORDER — DEXAMETHASONE SODIUM PHOSPHATE 4 MG/ML
INJECTION, SOLUTION INTRA-ARTICULAR; INTRALESIONAL; INTRAMUSCULAR; INTRAVENOUS; SOFT TISSUE AS NEEDED
Status: DISCONTINUED | OUTPATIENT
Start: 2024-06-24 | End: 2024-06-24 | Stop reason: SURG

## 2024-06-24 RX ORDER — SODIUM CHLORIDE 0.9 % (FLUSH) 0.9 %
10 SYRINGE (ML) INJECTION AS NEEDED
Status: DISCONTINUED | OUTPATIENT
Start: 2024-06-24 | End: 2024-06-25 | Stop reason: HOSPADM

## 2024-06-24 RX ORDER — EPHEDRINE SULFATE 50 MG/ML
5 INJECTION, SOLUTION INTRAVENOUS ONCE AS NEEDED
Status: DISCONTINUED | OUTPATIENT
Start: 2024-06-24 | End: 2024-06-24 | Stop reason: HOSPADM

## 2024-06-24 RX ORDER — LIDOCAINE HYDROCHLORIDE 20 MG/ML
INJECTION, SOLUTION EPIDURAL; INFILTRATION; INTRACAUDAL; PERINEURAL AS NEEDED
Status: DISCONTINUED | OUTPATIENT
Start: 2024-06-24 | End: 2024-06-24 | Stop reason: SURG

## 2024-06-24 RX ORDER — ACETAMINOPHEN 500 MG
1000 TABLET ORAL
Status: COMPLETED | OUTPATIENT
Start: 2024-06-24 | End: 2024-06-24

## 2024-06-24 RX ADMIN — METFORMIN HYDROCHLORIDE 1000 MG: 500 TABLET, EXTENDED RELEASE ORAL at 14:47

## 2024-06-24 RX ADMIN — MEPIVACAINE HYDROCHLORIDE 10 ML: 20 INJECTION, SOLUTION EPIDURAL; INFILTRATION at 06:24

## 2024-06-24 RX ADMIN — SODIUM CHLORIDE 2000 MG: 900 INJECTION INTRAVENOUS at 22:56

## 2024-06-24 RX ADMIN — MIDAZOLAM 1 MG: 1 INJECTION INTRAMUSCULAR; INTRAVENOUS at 06:20

## 2024-06-24 RX ADMIN — SODIUM CHLORIDE 2000 MG: 900 INJECTION INTRAVENOUS at 06:49

## 2024-06-24 RX ADMIN — OXYCODONE AND ACETAMINOPHEN 1 TABLET: 7.5; 325 TABLET ORAL at 12:54

## 2024-06-24 RX ADMIN — ROSUVASTATIN CALCIUM 20 MG: 20 TABLET, FILM COATED ORAL at 21:35

## 2024-06-24 RX ADMIN — SODIUM CHLORIDE, POTASSIUM CHLORIDE, SODIUM LACTATE AND CALCIUM CHLORIDE: 600; 310; 30; 20 INJECTION, SOLUTION INTRAVENOUS at 08:26

## 2024-06-24 RX ADMIN — ROPIVACAINE HYDROCHLORIDE 20 ML: 5 INJECTION EPIDURAL; INFILTRATION; PERINEURAL at 06:24

## 2024-06-24 RX ADMIN — EMPAGLIFLOZIN 25 MG: 25 TABLET, FILM COATED ORAL at 12:54

## 2024-06-24 RX ADMIN — OXYCODONE AND ACETAMINOPHEN 1 TABLET: 7.5; 325 TABLET ORAL at 17:39

## 2024-06-24 RX ADMIN — SUGAMMADEX 200 MG: 100 INJECTION, SOLUTION INTRAVENOUS at 08:52

## 2024-06-24 RX ADMIN — FENTANYL CITRATE 50 MCG: 50 INJECTION, SOLUTION INTRAMUSCULAR; INTRAVENOUS at 06:20

## 2024-06-24 RX ADMIN — ACETAMINOPHEN 1000 MG: 500 TABLET ORAL at 06:14

## 2024-06-24 RX ADMIN — FAMOTIDINE 20 MG: 10 INJECTION INTRAVENOUS at 06:14

## 2024-06-24 RX ADMIN — OXYCODONE AND ACETAMINOPHEN 1 TABLET: 7.5; 325 TABLET ORAL at 21:39

## 2024-06-24 RX ADMIN — ONDANSETRON 4 MG: 2 INJECTION INTRAMUSCULAR; INTRAVENOUS at 08:52

## 2024-06-24 RX ADMIN — HYDROCHLOROTHIAZIDE 25 MG: 25 TABLET ORAL at 14:47

## 2024-06-24 RX ADMIN — METFORMIN HYDROCHLORIDE 1000 MG: 500 TABLET, EXTENDED RELEASE ORAL at 21:35

## 2024-06-24 RX ADMIN — PROPOFOL 25 MCG/KG/MIN: 10 INJECTION, EMULSION INTRAVENOUS at 07:24

## 2024-06-24 RX ADMIN — Medication 10 ML: at 10:20

## 2024-06-24 RX ADMIN — ROPIVACAINE HYDROCHLORIDE 10 ML: 5 INJECTION, SOLUTION EPIDURAL; INFILTRATION; PERINEURAL at 06:29

## 2024-06-24 RX ADMIN — DEXAMETHASONE SODIUM PHOSPHATE 4 MG: 4 INJECTION, SOLUTION INTRA-ARTICULAR; INTRALESIONAL; INTRAMUSCULAR; INTRAVENOUS; SOFT TISSUE at 06:24

## 2024-06-24 RX ADMIN — LIDOCAINE HYDROCHLORIDE 100 MG: 20 INJECTION, SOLUTION EPIDURAL; INFILTRATION; INTRACAUDAL; PERINEURAL at 08:52

## 2024-06-24 RX ADMIN — SODIUM CHLORIDE, POTASSIUM CHLORIDE, SODIUM LACTATE AND CALCIUM CHLORIDE 9 ML/HR: 600; 310; 30; 20 INJECTION, SOLUTION INTRAVENOUS at 06:05

## 2024-06-24 RX ADMIN — ROCURONIUM BROMIDE 50 MG: 10 INJECTION, SOLUTION INTRAVENOUS at 07:01

## 2024-06-24 RX ADMIN — Medication 10 ML: at 21:35

## 2024-06-24 RX ADMIN — Medication 100 MCG: at 07:09

## 2024-06-24 RX ADMIN — DEXAMETHASONE SODIUM PHOSPHATE 8 MG: 4 INJECTION, SOLUTION INTRAMUSCULAR; INTRAVENOUS at 07:10

## 2024-06-24 RX ADMIN — SODIUM CHLORIDE 2000 MG: 900 INJECTION INTRAVENOUS at 14:47

## 2024-06-24 RX ADMIN — PROPOFOL 180 MG: 10 INJECTION, EMULSION INTRAVENOUS at 07:00

## 2024-06-24 RX ADMIN — LIDOCAINE HYDROCHLORIDE 100 MG: 20 INJECTION, SOLUTION EPIDURAL; INFILTRATION; INTRACAUDAL; PERINEURAL at 07:00

## 2024-06-24 NOTE — ANESTHESIA PROCEDURE NOTES
Airway  Urgency: elective    Date/Time: 6/24/2024 7:03 AM  Airway not difficult    General Information and Staff    Patient location during procedure: OR  Anesthesiologist: Lj Cohen MD  CRNA/CAA: Tad Daniels CRNA    Indications and Patient Condition  Indications for airway management: airway protection    Preoxygenated: yes  MILS not maintained throughout  Mask difficulty assessment: 1 - vent by mask    Final Airway Details  Final airway type: endotracheal airway      Successful airway: ETT  Cuffed: yes   Successful intubation technique: direct laryngoscopy  Facilitating devices/methods: anterior pressure/BURP  Endotracheal tube insertion site: oral  Blade: Justice  Blade size: 3  ETT size (mm): 7.0  Cormack-Lehane Classification: grade IIb - view of arytenoids or posterior of glottis only  Placement verified by: capnometry   Cuff volume (mL): 6  Measured from: lips  ETT/EBT  to lips (cm): 22  Number of attempts at approach: 1  Assessment: lips, teeth, and gum same as pre-op and atraumatic intubation

## 2024-06-24 NOTE — ANESTHESIA PREPROCEDURE EVALUATION
Anesthesia Evaluation     history of anesthetic complications:  PONV               Airway   Mallampati: II  Dental      Pulmonary    (-) sleep apnea, not a smoker    ROS comment: Negative patient screen for MIRE[facility]    Cardiovascular     (+) hypertension, hyperlipidemia      Neuro/Psych  (+) psychiatric history Anxiety  GI/Hepatic/Renal/Endo    (+) obesity, liver disease, diabetes mellitus type 2, thyroid problem hypothyroidism    Musculoskeletal     Abdominal    Substance History      OB/GYN          Other                      Anesthesia Plan    ASA 3     general     (Block placed for postoperative pain control per surgeon request.  )    Anesthetic plan, risks, benefits, and alternatives have been provided, discussed and informed consent has been obtained with: patient.      CODE STATUS:

## 2024-06-24 NOTE — ANESTHESIA PROCEDURE NOTES
Peripheral Block      Patient reassessed immediately prior to procedure    Patient location during procedure: pre-op  Start time: 6/24/2024 6:16 AM  Stop time: 6/24/2024 6:24 AM  Reason for block: at surgeon's request and post-op pain management  Performed by  Anesthesiologist: Lj Cohen MD  Preanesthetic Checklist  Completed: patient identified and risks and benefits discussed  Prep:  Sterile barriers:gloves  Prep: ChloraPrep  Patient monitoring: blood pressure monitoring, continuous pulse oximetry and EKG  Procedure    Sedation: yes    Guidance:ultrasound guided    ULTRASOUND INTERPRETATION.  Using ultrasound guidance a 21 G gauge needle was placed in close proximity to the sciatic nerve, at which point, under ultrasound guidance anesthetic was injected in the area of the nerve and spread of the anesthesia was seen on ultrasound in close proximity thereto.  There were no abnormalities seen on ultrasound; a digital image was taken; and the patient tolerated the procedure with no complications. Images:still images obtained    Laterality:right  Block Type:sciatic and popliteal  Injection Technique:single-shot  Needle Type:short-bevel  Needle Gauge:21 G      Medications Used: dexamethasone (DECADRON) injection - Injection   4 mg - 6/24/2024 6:24:00 AM  ropivacaine (NAROPIN) 0.5 % injection - Injection   20 mL - 6/24/2024 6:24:00 AM  mepivacaine PF (CARBOCAINE) 2 % injection - Injection   10 mL - 6/24/2024 6:24:00 AM      Medications  Comment:      Post Assessment  Injection Assessment: negative aspiration for heme, no paresthesia on injection and incremental injection  Patient Tolerance:comfortable throughout block  Complications:no  Additional Notes  Ultrasound Interpretation:  Using ultrasound guidance the needle was placed in close proximity to the Sciatic Nerve in the Popliteal Fossa at which point, under ultrasound guidance anesthetic was injected in the area of the nerve and spread of the anesthetic was  seen on ultrasound in close proximity thereto.  There were no abnormalities seen on ultrasound; a digital image was taken; the patient tolerated the procedure without complications.  Block placed for postoperative pain control per surgeon request.    Performed by: Lj Cohen MD

## 2024-06-24 NOTE — OP NOTE
Procedure Note    Lynda Rueda  6/24/2024    Pre-op Diagnosis:   Severe end-stage right ankle posttraumatic arthritis  Chronic secondary lateral ligament instability         Post-Op Diagnosis Codes:  1.   Severe end-stage right ankle posttraumatic arthritis  2.   Chronic secondary lateral ligament instability  3.   Impending pathologic stress fracture, right distal medial tibia/medial malleolus    Procedure:  Right total ankle arthroplasty (07298)  Prophylactic internal fixation, right distal medial tibia/medial malleolus (73090)  Right lateral ankle secondary ligament reconstruction (24146)        Surgeon(s):  Jim Thomas Jr., MD    Assistants:  None    Anesthesia: General with Block    Estimated Blood Loss: minimal    Specimens:                None      Drains: * No LDAs found *    Complications:   None apparent    Disposition:  Stable to PACU for recovery    Indications for procedure:  The patient is a very pleasant 63-year-old female who has had progressive pain and dysfunction related to severe right ankle posttraumatic arthritis with associated ligament instability.  Her symptoms were refractory to prolonged nonoperative management.  The patient requested surgical intervention.  The above listed procedures were recommended.  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  Plan is to proceed with all titanium implants.  The risks/benefits of surgery, including pain, infection, wound healing problems, need for future procedures, intraoperative fracture, prosthesis failure requiring revision or conversion to arthrodesis, DVT/PE, cardiac event, and/or death were discussed, and the patient elected to proceed with surgery.    Procedure in detail:    The correct patient was identified in preoperative holding.  All risks and benefits of surgery were again discussed in detail, and the patient agreed to proceed with surgery.  The operative extremity was confirmed and marked by myself.  Operative consent  reviewed and confirmed to be signed by the patient and myself.    At this time, the patient was wheeled to the operative theatre and placed supine on the OR table.  HERNANDEZ/SCD placed on nonoperative leg. Anesthesia was induced smoothly by our anesthesia colleagues.   Well padded nonsterile tourniquet placed on the upper thigh. The right lower extremity was prepped and draped in standard sterile fashion.  Appropriate presurgical timeout was performed, confirming correct patient, correct extremity, correct procedure, availability of sterile instruments/implants, and the administration of intravenous antibiotics in the form of Ancef within one hour of skin incision.       A longitudinal incision is made over the anterior ankle with a #15 blade and careful  subcutaneous dissection carried down to the retinacular layer. Any branches of the  superficial peroneal nerve are carefully identified and protected. The tibialis anterior and  EHL tendons are identified. The retinaculum/fascia layer is opened in longitudinal fashion in the TA/EHL interval. Full thickness medial and lateral subperiosteal flaps are elevated, and all soft tissues are meticulously removed off the distal anterior tibia and dorsal talar neck/dome to assure excellent surface matching between the bone and alignment guides.    Osteophytes are carefully maintained as per the preop navigation  guide/technique and certain, nonessential loose ossicles/bodies excised, as per our preop plan. The deep anterior neurovascular bundle is carefully protected throughout the case. The tibial alignment guide was first placed on the provided bone model to gain tactile and visual landmarks for proper fit/placement. Metallic markers are then placed in the alignment guide. The alignment guide is then carefully positioned on the distal tibia in the proper location and secured in position with a 2.4 Steinmann pin.     Another pin is placed in the guide to serve as a coronal  alignment guide. Intraoperative fluoroscopy is used to confirm appropriate guide orientation/placement using AP ankle view, which is then rechecked by comparison to the preop guide. The remaining Steinmann pins are placed to secure the guide to the distal tibia. Alignment guide removed and coronal sizing guide and conversion instrument placed and secured. The size, orientation, and planned resections are verified under AP and lateral fluoro views. The proximal medial and lateral corners are drilled bicortically. The coronal sizing guide is removed and resection guide placed. Pins are trimmed flush to the guide. The proximal, medial, and lateral tibial resections are then carefully made with oscillating saw. Guide and distal two pins removed, followed by  removal of the anterior wedge of the tibial resection with osteotome.    The foot is plantarflexed and in similar fashion, the talar alignment guide is  checked on the provided bone model, then carefully positioned on the dorsal talus in the  proper location and temporarily secured in place with a Steinmann pin, followed by two  Steinmann pins in the anterior guide pin holes. The planned resection is checked on AP and lateral fluoroscopy, and then confirmed with the preop guide. The initial pin is removed and then the alignment guide removed. The appropriate resection guide is selected and placed on the talus. Two additional gutter pins are used to secure it into proper position and pins cut flush with the guide. The talar resection is made with oscillating saw, guide removed, cut checked, and residual talar and tibial bone removed in standard fashion. Of note, for the tibia, the corner chisel is carefully utilized to confirm completed bone cuts, followed by bone removal screw to assist with tibial resection removal.    Reciprocating saw and rongeur used to remove any excess bone. The joint space is  thoroughly irrigated out. The appropriately-sized tibial tray  trial is placed, lamina  spreaders used to assure trial flush with distal tibial resection. The trial is sized/positioned to optimize tibial coverage. AP and lateral fluoro views are used to make these assessments/adjustments. Once in optimal position, the tibial stem is progressively drilled and broached in standard technique.    Once the tibia was prepared, broaches are removed, and trial left in place.    The appropriately-sized talar dome and poly trials are then placed. Sizing, position, and  overall stability are assessed with AP and lateral fluoro views. Manual ankle  dorsiflexion/plantarflexion is used to establish the center of rotation and allow the talar  component to rotate into the anatomic position. The talar dome trial is pinned into place,  and poly and talar dome trials removed. The talar peg drill guide is properly  positioned flush on the resected talar surface.  Trial reduction performed to establish optimal  medial/lateral positioning. This is confirmed directly as well as with fluoroscopy.  The talar peg drill guide is temporarily held in place with fixation  screws, and checked directly and under fluoro and found to be in appropriate position.  The talar pegs are sequentially drilled in standard fashion.      Prior to implant placement, it was noted that the distal medial tibia/medial malleolus had gotten fairly thin and there was concern for an impending stress fracture.  I thus proceeded with prophylactic fixation/treatment of the distal medial tibia.  A small incision was made over the distal medial tibia.  Dissection was carried down bluntly to the medial malleolus, taking care to avoid protecting branches of the saphenous vein and saphenous nerve.  A guidewire from the Medline 4.0 mm headed partially-threaded screw set was placed across the distal medial tibia/medial malleolus.  Ideal wire placement was confirmed on multiple views of fluoroscopy.  This was then measured, drilled, and  appropriate  length 4.0 mm headed partially-threaded screw placed in excellent position in the distal medial tibia completing prophylactic treatment of the impending stress fracture.     All pins removed from the talus and tibia along with the talar peg drill guide and trials. The joint is thoroughly irrigated out of any debris and dried. The final tibial tray component is brought onto the field, Kinos Titanium X-stem size 1.    The tibial component is placed on the insertor and gently impacted into position.     The final talar component is brought onto the field, Kinos titanium size 1. The talar component is placed into the ankle, assuring that the pegs align correctly, and then gently impacted. Direct inspection and AP and lateral fluoroscopy are used to ensure proper component positioning and alignment in their final seated positions.    The proper poly size is selected, based on trialing and assessment of ankle motion and joint stability. Meticulous and careful gutter debridement is performed with a narrow  reciprocating saw blade on TPS. All debris removed from the joint after thorough irrigation.    The final poly liner is brought onto the field, size 8mm. The poly is installed on the insertor in standard fashion and fully seated into its final position.    At this time, range of motion of the ankle and lateral ligament stress testing revealed residual lateral ankle instability on anterior drawer and talar tilt.    A curvilinear incision is made obliquely over the distal fibula. Careful subcutaneous dissection is carried down with tenotomy scissors, working in the interval between the superficial peroneal and sural nerves. The inferior extensor retinaculum is identified and mobilized. The peroneals are protected and retracted inferiorly allowing visualization and assessment of the calcaneofibular ligament, which was found to be insufficient. The lateral ankle capsuloligamentous complex (ATFL and CFL) is  incised and carefully taken off the distal anterior fibula. A subperiosteal dissection is used to elevate a small periosteal flap on the distal fibula. A rongeur is used to create a trough of cancellous bone across the anterior distal fibula. Two 3.3 Medline anchors are placed in the anterior distal fibula after predrilling at the anatomic footprints for the ATFL and CFL. The anchors are tested and achieve stable fixation. The exposed portion of the ankle joint is thoroughly irrigated out of any debris. The sutures are passed in the appropriate position through the elevated, capsuloligamentous sleeve, which is then advanced back to the prepared cancellous bed on the distal fibula, retensioning the ligaments and completing the secondary reconstruction. The sutures are tied sequentially while holding a posterior drawer on the talus, with the ATFL and CFL tensioned in dorsiflexion/eversion and plantarflexion/eversion, respectively. The inferior extensor retinaculum is advanced over this repair in a pants-over-vest fashion back to the periosteal flap with 2-0 Vicryl suture to create the Tsang modification. Excellent stability is restored to the ankle.       Assessment of the ankle revealed stable componentry in all planes and good ankle ROM  with dorsiflexion to 20 degrees and full plantarflexion.  There was no need for Achilles lengthening or gastrocnemius recession.  Final AP/mortise/lateral ankle  fluoroscopic views confirmed excellent component placement and ankle alignment without complication.     The foot and ankle now were well-balanced.    The joint is then thoroughly irrigated out. The anterior ankle incision was closed in layered fashion with 2-0 Vicryl in the capsular/retinacular layers, 3-0 Vicryl in the subcutaneous layers, and a combination of staples and nylon on the skin in interrupted fashion. The skin was cleaned and dried and a sterile dressing applied, followed by a well-padded, short leg splint  "(posterior slab and stirrup) in neutral/slight eversion position. The tourniquet had been released and brisk capillary refill returned to all toes.  Patient was awoken from anesthesia without apparent complication and taken to PACU for recovery.    Postoperative Plan:  Weightbearing status: Non-weightbearing in the splint  DVT Prophylaxis: Aspirin 325mg daily;  Patient will be instructed to elevate as much as possible (\"toes above the nose\") and to get up and move around at least once per hour while awake to help minimize risk of blood clots.            Jim Thomas Jr, MD     Date: 6/24/2024  Time: 06:50 EDT        "

## 2024-06-24 NOTE — H&P
[facility]   HISTORY AND PHYSICAL    Patient Name: Lynda Rueda  : 1960  MRN: [id number]  Primary Care Physician:  Shun Miranda DO  Date of admission: 2024    Subjective   Subjective     Chief Complaint: right ankle pain    History of Present Illness    The patient is a very pleasant 63-year-old female who presents today for right total ankle replacement, possible Achilles lengthening, possible collateral ligament reconstruction.  Please refer to office H&P for full details.  No significant change in history, exam, plan.    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  We will proceed with all titanium implants.    The patient does have well-controlled type 2 diabetes mellitus.  Her last hemoglobin A1c was 6.9%.    Review of Systems   Review of Systems:  Constitutional: Negative  HENT: Negative  Eyes: Negative  Respiratory: Negative; no shortness of breath  Cardiovascular: Negative; no current chest pain  Gastrointestinal: Negative  : Negative  Skin: Negative except as listed in HPI  Neurological: Negative, no numbness or deficits  Hematological: Negative  Muscoloskeletal: Per HPI                 Personal History     Past Medical History:   Diagnosis Date    Ankle pain     Anxiety     Arthritis     Cataract     bilateral    GERD (gastroesophageal reflux disease)     H/O colonoscopy 2012    History of kidney stones     History of migraine     Hyperlipidemia     Hypertension     Knee swelling     Low back pain     Nonproliferative diabetic retinopathy     Osteopenia     PONV (postoperative nausea and vomiting)     Rotator cuff injury 2020    RIGHT    Rotator cuff syndrome     Stress fracture foot    Tear of meniscus of knee     Thyroid disease     now hypothyroidism s/p thyroidectomy    Type 2 diabetes mellitus with hyperglycemia, without long-term current use of insulin        Past Surgical History:   Procedure Laterality Date    APPENDECTOMY      BREAST BIOPSY Right     BENIGN      SECTION      CHOLECYSTECTOMY  1985    COLONOSCOPY N/A 06/15/2018    Procedure: COLONOSCOPY INTO CECUM AND T.I. WITH BIOPSIES AND COLD BIOPSY POLYPECTOMY;  Surgeon: Sunil Cardona MD;  [facility];  Service: Gastroenterology    COLONOSCOPY N/A 07/15/2022    Procedure: COLONOSCOPY to cecum with cold snare polypectomy;  Surgeon: Sunil Cardona MD;  Location:  [facility];  Service: Gastroenterology;  Laterality: N/A;  pre- hx colon polyps   post- polyp     DILATATION AND CURETTAGE      HYSTERECTOMY  2007    OVARIAN MASS/SMALL INTESTINE    KNEE SURGERY Left 2013    meniscal surgery    LASIK Bilateral     OVARIAN CYST REMOVAL  2004    RHINOPLASTY      SHOULDER ARTHROSCOPY W/ ROTATOR CUFF REPAIR Right 2021    Procedure: SHOULDER ARTHROSCOPY WITH ROTATOR CUFF REPAIR, subacromial decompression;  Surgeon: Juan Pablo Major MD;  [place]  [facility] [place] [facility] [place]  Service: [place]opedics;  Laterality: Right;    SHOULDER SURGERY  2021    right    THYROIDECTOMY  2016    TRIGGER POINT INJECTION         Family History: family history includes Bipolar disorder in her sister; COPD in her mother; Cancer in her father; Colon cancer (age of onset: 80) in her father; Depression in her sister; Diabetes in her brother; Dislocations in her sister; Heart attack (age of onset: 63) in her brother; Heart failure in her mother; Hyperlipidemia in her mother; Hypertension in her mother; Melanoma in her mother; Osteoporosis in her mother; Scoliosis in her mother; Severe sprains in her sister; Thyroid disease in her sister; Transient ischemic attack in her mother. Otherwise pertinent FHx was reviewed and not pertinent to current issue.    Social History:  reports that she has never smoked. She has been exposed to tobacco smoke. She has never used smokeless tobacco. She reports that she does not currently use alcohol. She reports that she does not use drugs.    Home Medications:  FreeStyle Mar 14 Day Sensor,  "Semaglutide (2 MG/DOSE), citalopram, empagliflozin, estradiol, fenofibrate, fexofenadine, hydroCHLOROthiazide, levothyroxine, metFORMIN ER, metoprolol succinate XL, rosuvastatin, triamcinolone, and vitamin D    Allergies:  Allergies   Allergen Reactions    Grapefruit Itching    Carbomer Other (See Comments) and Hives     \"Acrylic\" - unknown reaction    Celecoxib Hives    Dulaglutide GI Intolerance     Abdominal pain, constipation    Methocarbamol Hives     ROBAXIN     Montelukast Hives    Penicillins Hives    [place] Other (See Comments)     UNKNOWN PER PT/POSITIVE ALLERGY TESTING    Gemifloxacin Hives    Grapefruit Extract Itching    Nickel Itching     WHEN PT TOOK THE ALLERGY TEST NICKEL, ACRYLIC AND COBALT CAME UP AS ALLERGIES    Oseltamivir Rash    Pollen Extract Other (See Comments)    Tamiflu [Oseltamivir Phosphate] Rash    Watermelon [Citrullus Vulgaris] Itching       Objective    Objective     Vitals:   Temp:  [98.1 °F (36.7 °C)] 98.1 °F (36.7 °C)  Heart Rate:  [73-79] 76  Resp:  [13-16] 13  BP: ()/(62-68) 94/62  Flow (L/min):  [2] 2    Physical Exam    Physical Exam:  Vital signs reviewed.  Constitutional:  Appears well-developed, well nourished.  Cardiovascular: Regular rate.  Pulmonary: Effort normal, symmetric chest expansion, no labored breathing.  Abdominal: Soft, non distended  Neurological: No gross deficits, oriented to person, place, and time.  Skin: Warm and dry  Psychiatric: Normal mood and affect  Musculoskeletal:         Active ankle dorsiflexion and plantarflexion.  Sensation is intact to light touch.  Toes are warm and well perfused with brisk capillary refill.         Assessment & Plan   Assessment / Plan     The patient is a very pleasant 63-year-old female who presents today for right total ankle replacement, possible Achilles lengthening, possible collateral ligament reconstruction.  Please refer to office H&P for full details.  No significant change in history, exam, plan.    The " patient does have a documented cobalt allergy.  We will proceed with all titanium implants.    She does have well-controlled diabetes mellitus with her last hemoglobin A1c 6.9%.  This is acceptable to proceed with joint arthroplasty although she will have an increased risk of wound healing problems, infection.    The risks/benefits of surgery, including pain, infection, wound healing problems, need for future procedures, intraoperative fracture, prosthesis failure requiring revision or conversion to arthrodesis, DVT/PE, cardiac event, and/or death were discussed, and the patient elected to proceed with surgery.      Jim Thomas Jr, MD

## 2024-06-24 NOTE — PLAN OF CARE
Goal Outcome Evaluation:  Plan of Care Reviewed With: patient        Progress: improving  Outcome Evaluation: vss, nvi, dressing c/d/i, ambulating SBA x1 with walker, NWB RLE, pain controlled with block and PO pain meds, plan to DC home tomorrow, educated on bp meds and monitoring

## 2024-06-24 NOTE — ANESTHESIA POSTPROCEDURE EVALUATION
Patient: Lynda Rueda    Procedure Summary       Date: 06/24/24 Room / Location:  [facility] [address] [place] [address] [facility]    Anesthesia Start: 0653 Anesthesia Stop: 0913    Procedure: RIGHT  TOTAL ANKLE REPLACEMENT, SECONDARY LATERAL LIGAMENT RECONSTRUCTION (Right: Ankle) Diagnosis:     Surgeons: Jim Thomas Jr., MD Provider: Lj Cohen MD    Anesthesia Type: general ASA Status: 3            Anesthesia Type: general    Vitals  Vitals Value Taken Time   BP 90/57 06/24/24 0946   Temp 36.8 °C (98.3 °F) 06/24/24 0911   Pulse 81 06/24/24 0948   Resp 14 06/24/24 0930   SpO2 100 % 06/24/24 0948   Vitals shown include unfiled device data.        Post Anesthesia Care and Evaluation    Patient location during evaluation: bedside  Patient participation: complete - patient participated  Level of consciousness: awake and alert  Pain management: adequate    Airway patency: patent  Anesthetic complications: No anesthetic complications  PONV Status: controlled  Cardiovascular status: blood pressure returned to baseline and acceptable  Respiratory status: acceptable  Hydration status: acceptable

## 2024-06-24 NOTE — ANESTHESIA PROCEDURE NOTES
Peripheral Block      Patient reassessed immediately prior to procedure    Patient location during procedure: pre-op  Start time: 6/24/2024 6:25 AM  Stop time: 6/24/2024 6:29 AM  Reason for block: at surgeon's request and post-op pain management  Performed by  Anesthesiologist: Lj Cohen MD  Preanesthetic Checklist  Completed: patient identified and risks and benefits discussed  Prep:  Sterile barriers:gloves  Prep: ChloraPrep  Patient monitoring: blood pressure monitoring, continuous pulse oximetry and EKG  Procedure    Sedation: yes    Guidance:ultrasound guided    ULTRASOUND INTERPRETATION.  Using ultrasound guidance a 21 G gauge needle was placed in close proximity to the nerve, at which point, under ultrasound guidance anesthetic was injected in the area of the nerve and spread of the anesthesia was seen on ultrasound in close proximity thereto.  There were no abnormalities seen on ultrasound; a digital image was taken; and the patient tolerated the procedure with no complications. Images:still images obtained, printed/placed on chart    Laterality:right  Block Type:adductor canal block  Injection Technique:single-shot  Needle Type:short-bevel  Needle Gauge:21 G      Medications Used: ropivacaine (NAROPIN) 0.5 % injection - Injection   10 mL - 6/24/2024 6:29:00 AM  mepivacaine PF (CARBOCAINE) 2 % injection - Injection   10 mL - 6/24/2024 6:29:00 AM      Medications  Comment:      Post Assessment  Injection Assessment: negative aspiration for heme, no paresthesia on injection and incremental injection  Patient Tolerance:comfortable throughout block  Complications:no  Additional Notes  Ultrasound Interpretation:  Using ultrasound guidance the needle was placed in adductor canal and anesthetic was injected in the area of the saphenous nerve and spread of the anesthetic was seen on ultrasound in close proximity thereto.  There were no abnormalities seen on ultrasound; a digital image was taken; and the  patient tolerated the procedure with no complications.    Block placed for postoperative pain control per surgeon request.    Performed by: Lj Cohen MD

## 2024-06-25 VITALS
OXYGEN SATURATION: 98 % | WEIGHT: 156.97 LBS | HEART RATE: 87 BPM | DIASTOLIC BLOOD PRESSURE: 72 MMHG | BODY MASS INDEX: 29.64 KG/M2 | TEMPERATURE: 98.2 F | RESPIRATION RATE: 16 BRPM | HEIGHT: 61 IN | SYSTOLIC BLOOD PRESSURE: 115 MMHG

## 2024-06-25 LAB — GLUCOSE BLDC GLUCOMTR-MCNC: 136 MG/DL (ref 70–130)

## 2024-06-25 PROCEDURE — 25010000002 CEFAZOLIN PER 500 MG: Performed by: ORTHOPAEDIC SURGERY

## 2024-06-25 PROCEDURE — 82948 REAGENT STRIP/BLOOD GLUCOSE: CPT

## 2024-06-25 PROCEDURE — 97530 THERAPEUTIC ACTIVITIES: CPT

## 2024-06-25 RX ORDER — OXYCODONE AND ACETAMINOPHEN 7.5; 325 MG/1; MG/1
1-2 TABLET ORAL EVERY 4 HOURS PRN
Qty: 34 TABLET | Refills: 0 | Status: SHIPPED | OUTPATIENT
Start: 2024-06-25 | End: 2025-06-25

## 2024-06-25 RX ORDER — ASPIRIN 325 MG
325 TABLET, DELAYED RELEASE (ENTERIC COATED) ORAL DAILY
Qty: 30 TABLET | Refills: 0 | Status: SHIPPED | OUTPATIENT
Start: 2024-06-25

## 2024-06-25 RX ORDER — ONDANSETRON 4 MG/1
4 TABLET, FILM COATED ORAL EVERY 8 HOURS PRN
Qty: 20 TABLET | Refills: 0 | Status: SHIPPED | OUTPATIENT
Start: 2024-06-25 | End: 2024-07-25

## 2024-06-25 RX ADMIN — OXYCODONE AND ACETAMINOPHEN 2 TABLET: 7.5; 325 TABLET ORAL at 06:21

## 2024-06-25 RX ADMIN — SENNOSIDES AND DOCUSATE SODIUM 2 TABLET: 50; 8.6 TABLET ORAL at 08:43

## 2024-06-25 RX ADMIN — LEVOTHYROXINE SODIUM 100 MCG: 100 TABLET ORAL at 06:20

## 2024-06-25 RX ADMIN — SODIUM CHLORIDE 2000 MG: 900 INJECTION INTRAVENOUS at 06:19

## 2024-06-25 RX ADMIN — METFORMIN HYDROCHLORIDE 1000 MG: 500 TABLET, EXTENDED RELEASE ORAL at 08:43

## 2024-06-25 RX ADMIN — CITALOPRAM 40 MG: 40 TABLET, FILM COATED ORAL at 06:20

## 2024-06-25 RX ADMIN — ASPIRIN 325 MG: 325 TABLET, COATED ORAL at 08:43

## 2024-06-25 RX ADMIN — OXYCODONE AND ACETAMINOPHEN 1 TABLET: 7.5; 325 TABLET ORAL at 02:05

## 2024-06-25 RX ADMIN — Medication 10 ML: at 08:43

## 2024-06-25 RX ADMIN — OXYCODONE AND ACETAMINOPHEN 2 TABLET: 7.5; 325 TABLET ORAL at 10:21

## 2024-06-25 RX ADMIN — EMPAGLIFLOZIN 25 MG: 25 TABLET, FILM COATED ORAL at 06:20

## 2024-06-25 RX ADMIN — HYDROCHLOROTHIAZIDE 25 MG: 25 TABLET ORAL at 08:43

## 2024-06-25 NOTE — THERAPY EVALUATION
Patient Name: Lynda Rueda  : 1960    MRN: [id number]                              Today's Date: 2024       Admit Date: 2024    Visit Dx:     ICD-10-CM ICD-9-CM   1. Arthritis of right ankle  M19.071 716.97     Patient Active Problem List   Diagnosis    Epigastric pain    Right upper quadrant pain    Allergic conjunctivitis    Eczema    Non-insulin dependent type 2 diabetes mellitus    Diarrhea    Dysuria    Excessive sweating    Simple obesity    Steatosis of liver    Menopausal flushing    Hyperlipidemia    Hypothyroidism    Arthralgia of hip    Morbid exogenous obesity    Peritoneal cyst    Hematuria    Goiter    Vitamin D deficiency    Candidiasis of vagina    Otitis media of left ear    Hip pain, left    Acquired hypothyroidism    Benign essential hypertension    Chronic fatigue syndrome    Intolerant of heat    Multiple-type hyperlipidemia    Hypocalcemia    Insomnia    Menopausal syndrome    Thyroid nodule    Controlled type 2 diabetes mellitus without complication    Chest pressure    EKG, abnormal    Chest pain    Murmur    Type 2 diabetes mellitus without complication, without long-term current use of insulin    Essential hypertension    Type 2 diabetes mellitus, uncontrolled    Gastroesophageal reflux disease with esophagitis    Controlled substance agreement signed    Health care maintenance    Vitamin B12 deficiency    Influenza    Lumbar radiculopathy    Chronic right shoulder pain    Ankle arthritis     Past Medical History:   Diagnosis Date    Ankle pain     Anxiety     Arthritis     Cataract     bilateral    GERD (gastroesophageal reflux disease)     H/O colonoscopy 2012    History of kidney stones     History of migraine     Hyperlipidemia     Hypertension     Knee swelling     Low back pain     Nonproliferative diabetic retinopathy     Osteopenia     PONV (postoperative nausea and vomiting)     Rotator cuff injury 2020    RIGHT    Rotator cuff syndrome     Stress  fracture foot    Tear of meniscus of knee     Thyroid disease     now hypothyroidism s/p thyroidectomy    Type 2 diabetes mellitus with hyperglycemia, without long-term current use of insulin      Past Surgical History:   Procedure Laterality Date    APPENDECTOMY      BREAST BIOPSY Right     BENIGN     SECTION      CHOLECYSTECTOMY  1985    COLONOSCOPY N/A 06/15/2018    Procedure: COLONOSCOPY INTO CECUM AND T.I. WITH BIOPSIES AND COLD BIOPSY POLYPECTOMY;  Surgeon: Sunil Cardona MD;  Location:  [facility];  Service: Gastroenterology    COLONOSCOPY N/A 07/15/2022    Procedure: COLONOSCOPY to cecum with cold snare polypectomy;  Surgeon: Sunil Cardona MD;  Location:  [facility];  Service: Gastroenterology;  Laterality: N/A;  pre- hx colon polyps   post- polyp     DILATATION AND CURETTAGE      HYSTERECTOMY  2007    OVARIAN MASS/SMALL INTESTINE    KNEE SURGERY Left 2013    meniscal surgery    LASIK Bilateral     OVARIAN CYST REMOVAL  2004    RHINOPLASTY      SHOULDER ARTHROSCOPY W/ ROTATOR CUFF REPAIR Right 2021    Procedure: SHOULDER ARTHROSCOPY WITH ROTATOR CUFF REPAIR, subacromial decompression;  Surgeon: Juan Pablo Major MD;  [place]  [facility] [place];  Service: Orthopedics;  Laterality: Right;    SHOULDER SURGERY  2021    right    THYROIDECTOMY  2016    TRIGGER POINT INJECTION        General Information    No documentation.                  Mobility    No documentation.                  Obj/Interventions    No documentation.                  Goals/Plan    No documentation.                  Clinical Impression       Row Name 24 1051          Plan of Care Review    Plan of Care Reviewed With patient  -EJ     Outcome Evaluation Pt is POD 1 R total ankle replacement. She has been doing well and has been up in room on her own. SHe is NWB to RLE and doing well maintaining this with mobility. Pt has cruthces, WC, and knee scooter at home, but does need walker for home use. Pt reports 1  steps to enter home. Educated pt on stair navigation. Pt verbalizes understanding and denies need to practice. SHe will have family support upon DC. All questions answered at this time. Pt  w no further concerns. She plans home today.  -CAREY               User Key  (r) = Recorded By, (t) = Taken By, (c) = Cosigned By      Initials Name Provider Type    Tomasa Rodriguez, PT Physical Therapist                   Outcome Measures       Row Name 06/25/24 9212          How much help from another person do you currently need...    Turning from your back to your side while in flat bed without using bedrails? 4 (P)   -CB     Moving from lying on back to sitting on the side of a flat bed without bedrails? 4 (P)   -CB     Moving to and from a bed to a chair (including a wheelchair)? 3 (P)   -CB     Standing up from a chair using your arms (e.g., wheelchair, bedside chair)? 4 (P)   -CB     Climbing 3-5 steps with a railing? 3 (P)   -CB     To walk in hospital room? 3 (P)   -CB     AM-PAC 6 Clicks Score (PT) 21 (P)   -CB     Highest Level of Mobility Goal 6 --> Walk 10 steps or more (P)   -CB               User Key  (r) = Recorded By, (t) = Taken By, (c) = Cosigned By      Initials Name Provider Type    Jose Gonzales, RN Extern Registered Nurse                                   PT Recommendation and Plan     Plan of Care Reviewed With: patient  Outcome Evaluation: Pt is POD 1 R total ankle replacement. She has been doing well and has been up in room on her own. SHe is NWB to E and doing well maintaining this with mobility. Pt has cruthces, WC, and knee scooter at home, but does need walker for home use. Pt reports 1 steps to enter home. Educated pt on stair navigation. Pt verbalizes understanding and denies need to practice. SHe will have family support upon DC. All questions answered at this time. Pt  w no further concerns. She plans home today.     Time Calculation:         PT Charges       Row Name 06/25/24 9034              Time Calculation    Start Time 0940  -EJ      Stop Time 0950  -EJ      Time Calculation (min) 10 min  -EJ      PT Received On 06/25/24  -EJ                User Key  (r) = Recorded By, (t) = Taken By, (c) = Cosigned By      Initials Name Provider Type    Tomasa Rodriguez, PT Physical Therapist                  Therapy Charges for Today       Code Description Service Date Service Provider Modifiers Qty    [id number]  PT THERAPEUTIC ACT EA 15 MIN 6/25/2024 Tomasa Posadas, PT GP 1            PT G-Codes  AM-PAC 6 Clicks Score (PT): (P) 21       Tomasa Posadas, TYSON  6/25/2024

## 2024-06-25 NOTE — PLAN OF CARE
Problem: Adult Inpatient Plan of Care  Goal: Plan of Care Review  Recent Flowsheet Documentation  Taken 6/25/2024 1051 by Tomasa Posadas PT  Plan of Care Reviewed With: patient  Outcome Evaluation: Pt is POD 1 R total ankle replacement. She has been doing well and has been up in room on her own. SHe is NWB to RLE and doing well maintaining this with mobility. Pt has cruthces, WC, and knee scooter at home, but does need walker for home use. Pt reports 1 steps to enter home. Educated pt on stair navigation. Pt verbalizes understanding and denies need to practice. SHe will have family support upon DC. All questions answered at this time. Pt  w no further concerns. She plans home today.   Goal Outcome Evaluation:  Plan of Care Reviewed With: patient           Outcome Evaluation: Pt is POD 1 R total ankle replacement. She has been doing well and has been up in room on her own. SHe is NWB to RLE and doing well maintaining this with mobility. Pt has cruthces, WC, and knee scooter at home, but does need walker for home use. Pt reports 1 steps to enter home. Educated pt on stair navigation. Pt verbalizes understanding and denies need to practice. SHe will have family support upon DC. All questions answered at this time. Pt  w no further concerns. She plans home today.

## 2024-06-25 NOTE — PLAN OF CARE
Goal Outcome Evaluation:  Plan of Care Reviewed With: (P) patient         Pt goals met and ready for DC.

## 2024-06-25 NOTE — PROGRESS NOTES
"Orthopaedic Surgery  Daily Progress Note    BP 93/68 (BP Location: Right arm, Patient Position: Lying)   Pulse 87   Temp 98.2 °F (36.8 °C) (Oral)   Resp 16   Ht 154.9 cm (61\")   Wt 71.2 kg (156 lb 15.5 oz)   SpO2 98%   BMI 29.66 kg/m²     Lab Results (last 24 hours)       Procedure Component Value Units Date/Time    POC Glucose Once [[id number]]  (Abnormal) Collected: 06/24/24 2051    Specimen: Blood Updated: 06/24/24 2052     Glucose 144 mg/dL     POC Glucose Once [[id number]]  (Abnormal) Collected: 06/24/24 1608    Specimen: Blood Updated: 06/24/24 1609     Glucose 198 mg/dL     POC Glucose Once [[id number]]  (Abnormal) Collected: 06/24/24 1113    Specimen: Blood Updated: 06/24/24 1114     Glucose 190 mg/dL     POC Glucose Once [[id number]]  (Abnormal) Collected: 06/24/24 0935    Specimen: Blood Updated: 06/24/24 0936     Glucose 137 mg/dL             Imaging Results (Last 24 Hours)       Procedure Component Value Units Date/Time    FL C Arm During Surgery [[id number]] Resulted: 06/24/24 0921     Updated: 06/24/24 0921    Narrative:      This procedure was auto-finalized with no dictation required.            Patient Care Team:  Shun Miranda DO as PCP - General (Internal Medicine)  Merline Soria MD as Consulting Physician (Obstetrics and Gynecology)    SUBJECTIVE  Pain controlled    PHYSICAL EXAM  Resting in NAD  Splint clean, dry, intact  Toes warm, perfused, brisk capillary refill  Flexes/extends toes   SILT over toes  No pain with passive stretch           Ankle arthritis      PLAN / DISPOSITION:  POD 1 s/p R TAR , doing well  1. Pain control: Orals  2.  Antibiotics: Periop Ancef to end today  3.  PT: NWB in splint  4. DVT: Aspirin 325 mg daily plus HERNANDEZ/SCDs, mobilization  5. Dispo: home today, follow up 2-3 weeks with me at [facility] ([phone] to make appointment)        Jim Thomas Jr, MD  06/25/24  06:59 EDT      "

## 2024-06-25 NOTE — PLAN OF CARE
Goal Outcome Evaluation:  Plan of Care Reviewed With: patient        Progress: improving  Outcome Evaluation: VSS, RA, SL, up with assist of 1, voidng per BRP, pain tolerable, NWB RLE, splint in place, educated on BP and glucose monitoring, plans for home today

## 2024-06-26 NOTE — CASE MANAGEMENT/SOCIAL WORK
Case Management Discharge Note      Final Note: Home.         Selected Continued Care - Discharged on 6/25/2024 Admission date: 6/24/2024 - Discharge disposition: Home or Self Care      Destination    No services have been selected for the patient.                Durable Medical Equipment    No services have been selected for the patient.                Dialysis/Infusion    No services have been selected for the patient.                Home Medical Care    No services have been selected for the patient.                Therapy    No services have been selected for the patient.                Community Resources    No services have been selected for the patient.                Community & DME    No services have been selected for the patient.                         Final Discharge Disposition Code: 01 - home or self-care

## 2024-09-03 ENCOUNTER — TRANSCRIBE ORDERS (OUTPATIENT)
Dept: ADMINISTRATIVE | Facility: HOSPITAL | Age: 64
End: 2024-09-03
Payer: COMMERCIAL

## 2024-09-03 ENCOUNTER — HOSPITAL ENCOUNTER (OUTPATIENT)
Dept: CARDIOLOGY | Facility: HOSPITAL | Age: 64
Discharge: HOME | End: 2024-09-03
Admitting: ORTHOPAEDIC SURGERY
Payer: COMMERCIAL

## 2024-09-03 DIAGNOSIS — M79.89 PAIN AND SWELLING OF LOWER EXTREMITY, RIGHT: ICD-10-CM

## 2024-09-03 DIAGNOSIS — M79.604 PAIN AND SWELLING OF LOWER EXTREMITY, RIGHT: Primary | ICD-10-CM

## 2024-09-03 DIAGNOSIS — M79.89 PAIN AND SWELLING OF LOWER EXTREMITY, RIGHT: Primary | ICD-10-CM

## 2024-09-03 DIAGNOSIS — M79.604 PAIN AND SWELLING OF LOWER EXTREMITY, RIGHT: ICD-10-CM

## 2024-09-03 LAB
BH CV LOWER VASCULAR LEFT COMMON FEMORAL AUGMENT: NORMAL
BH CV LOWER VASCULAR LEFT COMMON FEMORAL COMPETENT: NORMAL
BH CV LOWER VASCULAR LEFT COMMON FEMORAL COMPRESS: NORMAL
BH CV LOWER VASCULAR LEFT COMMON FEMORAL PHASIC: NORMAL
BH CV LOWER VASCULAR LEFT COMMON FEMORAL SPONT: NORMAL
BH CV LOWER VASCULAR RIGHT COMMON FEMORAL AUGMENT: NORMAL
BH CV LOWER VASCULAR RIGHT COMMON FEMORAL COMPETENT: NORMAL
BH CV LOWER VASCULAR RIGHT COMMON FEMORAL COMPRESS: NORMAL
BH CV LOWER VASCULAR RIGHT COMMON FEMORAL PHASIC: NORMAL
BH CV LOWER VASCULAR RIGHT COMMON FEMORAL SPONT: NORMAL
BH CV LOWER VASCULAR RIGHT DISTAL FEMORAL COMPRESS: NORMAL
BH CV LOWER VASCULAR RIGHT GASTRONEMIUS COMPRESS: NORMAL
BH CV LOWER VASCULAR RIGHT GREATER SAPH AK COMPRESS: NORMAL
BH CV LOWER VASCULAR RIGHT GREATER SAPH BK COMPRESS: NORMAL
BH CV LOWER VASCULAR RIGHT LESSER SAPH COMPRESS: NORMAL
BH CV LOWER VASCULAR RIGHT MID FEMORAL AUGMENT: NORMAL
BH CV LOWER VASCULAR RIGHT MID FEMORAL COMPETENT: NORMAL
BH CV LOWER VASCULAR RIGHT MID FEMORAL COMPRESS: NORMAL
BH CV LOWER VASCULAR RIGHT MID FEMORAL PHASIC: NORMAL
BH CV LOWER VASCULAR RIGHT MID FEMORAL SPONT: NORMAL
BH CV LOWER VASCULAR RIGHT PERONEAL COMPRESS: NORMAL
BH CV LOWER VASCULAR RIGHT POPLITEAL AUGMENT: NORMAL
BH CV LOWER VASCULAR RIGHT POPLITEAL COMPETENT: NORMAL
BH CV LOWER VASCULAR RIGHT POPLITEAL COMPRESS: NORMAL
BH CV LOWER VASCULAR RIGHT POPLITEAL PHASIC: NORMAL
BH CV LOWER VASCULAR RIGHT POPLITEAL SPONT: NORMAL
BH CV LOWER VASCULAR RIGHT POSTERIOR TIBIAL COMPRESS: NORMAL
BH CV LOWER VASCULAR RIGHT PROFUNDA FEMORAL COMPRESS: NORMAL
BH CV LOWER VASCULAR RIGHT PROXIMAL FEMORAL COMPRESS: NORMAL
BH CV LOWER VASCULAR RIGHT SAPHENOFEMORAL JUNCTION COMPRESS: NORMAL
BH CV VAS PRELIMINARY FINDINGS SCRIPTING: 1

## 2024-09-03 PROCEDURE — 93971 EXTREMITY STUDY: CPT | Performed by: SURGERY

## 2024-09-03 PROCEDURE — 93971 EXTREMITY STUDY: CPT

## 2024-10-11 RX ORDER — CITALOPRAM HYDROBROMIDE 40 MG/1
TABLET ORAL
Qty: 90 TABLET | Refills: 1 | Status: SHIPPED | OUTPATIENT
Start: 2024-10-11

## 2024-12-17 RX ORDER — ROSUVASTATIN CALCIUM 20 MG/1
TABLET, COATED ORAL
Qty: 90 TABLET | Refills: 1 | Status: SHIPPED | OUTPATIENT
Start: 2024-12-17

## 2025-01-09 ENCOUNTER — OFFICE VISIT (OUTPATIENT)
Dept: INTERNAL MEDICINE | Facility: CLINIC | Age: 65
End: 2025-01-09
Payer: COMMERCIAL

## 2025-01-09 VITALS
BODY MASS INDEX: 29.27 KG/M2 | HEIGHT: 61 IN | OXYGEN SATURATION: 99 % | HEART RATE: 84 BPM | TEMPERATURE: 97.8 F | SYSTOLIC BLOOD PRESSURE: 126 MMHG | WEIGHT: 155 LBS | DIASTOLIC BLOOD PRESSURE: 70 MMHG

## 2025-01-09 DIAGNOSIS — Z23 NEED FOR SHINGLES VACCINE: ICD-10-CM

## 2025-01-09 DIAGNOSIS — K59.00 CONSTIPATION, UNSPECIFIED CONSTIPATION TYPE: ICD-10-CM

## 2025-01-09 DIAGNOSIS — I10 BENIGN ESSENTIAL HYPERTENSION: Primary | ICD-10-CM
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
## 2025-01-09 DIAGNOSIS — R10.32 LEFT GROIN PAIN: ICD-10-CM

## 2025-01-09 DIAGNOSIS — E03.9 ACQUIRED HYPOTHYROIDISM: ICD-10-CM

## 2025-01-09 PROCEDURE — 90750 HZV VACC RECOMBINANT IM: CPT | Performed by: STUDENT IN AN ORGANIZED HEALTH CARE EDUCATION/TRAINING PROGRAM

## 2025-01-09 PROCEDURE — 90471 IMMUNIZATION ADMIN: CPT | Performed by: STUDENT IN AN ORGANIZED HEALTH CARE EDUCATION/TRAINING PROGRAM

## 2025-01-09 PROCEDURE — 99214 OFFICE O/P EST MOD 30 MIN: CPT | Performed by: STUDENT IN AN ORGANIZED HEALTH CARE EDUCATION/TRAINING PROGRAM

## 2025-01-09 RX ORDER — UBIDECARENONE 75 MG
50 CAPSULE ORAL DAILY
COMMUNITY

## 2025-01-09 RX ORDER — METOPROLOL SUCCINATE 50 MG/1
50 TABLET, EXTENDED RELEASE ORAL EVERY MORNING
Qty: 90 TABLET | Refills: 2 | Status: SHIPPED | OUTPATIENT
Start: 2025-01-09

## 2025-01-09 NOTE — PROGRESS NOTES
"    Chief Complaint  Hypertension    SUBJECTIVE    History of Present Illness    Lynda Rueda is a 64 y.o. female who presents to the office today as an established patient that last saw me on 6/6/2024.     HTN: taking metoprolol succinate 50 mg daily. Doesn't check BP at home.    States she fell forward on her deck 4 days ago due to ice , landed on the stomach side. Felt like she had a sensation of having the wind knocked out of her and had a hard time catching her breath. She felt dizzy for a few minutes until she got into the chair and rested.   No pains or joint issues since then. She did not hit her head nor lose consciousness.     Hypothyroidism: had a thyroidectomy due to nodules, denies thyroid cancer history, and is now on levothyroxine 100 mcg every day, increased from 6 days weekly by endocrinology 10/2024.    States for years she has bowel movements that are every day to every 2 days and can sometimes feel constipated. No blood in the stool. She used miralax for 4 months every day which helped. She last used it months ago. Not currently taking fiber.     States in the left groin she can have pain and feels a catching sensation when going from a seated to standing position. Never hurts when seated. The pain is rapid onset and rapid relief after she starts moving. This has been going on for a year or so.     Allergies   Allergen Reactions    Grapefruit Itching    Carbomer Other (See Comments) and Hives     \"Acrylic\" - unknown reaction    Celecoxib Hives    Dulaglutide GI Intolerance     Abdominal pain, constipation    Methocarbamol Hives     ROBAXIN     Montelukast Hives    Penicillins Hives    [place] Other (See Comments)     UNKNOWN PER PT/POSITIVE ALLERGY TESTING    Gemifloxacin Hives    Grapefruit Extract Itching    Nickel Itching     WHEN PT TOOK THE ALLERGY TEST NICKEL, ACRYLIC AND COBALT CAME UP AS ALLERGIES    Oseltamivir Rash    Pollen Extract Other (See Comments)    Tamiflu [Oseltamivir " Phosphate] Rash    Watermelon [Citrullus Vulgaris] Itching        Outpatient Medications Marked as Taking for the 1/9/25 encounter (Office Visit) with Shun Miranda,    Medication Sig Dispense Refill    citalopram (CeleXA) 40 MG tablet TAKE 1 TABLET BY MOUTH EVERY DAY IN THE MORNING 90 tablet 1    empagliflozin (JARDIANCE) 25 MG tablet tablet Take 1 tablet by mouth Every Morning.      fenofibrate 160 MG tablet TAKE 1 TABLET BY MOUTH EVERY DAY (Patient taking differently: Take 1 tablet by mouth Every Morning.) 90 tablet 1    fexofenadine (ALLEGRA) 180 MG tablet Take 1 tablet by mouth Every Night.      levothyroxine (SYNTHROID, LEVOTHROID) 100 MCG tablet Take 1 tablet by mouth Daily.      metFORMIN ER (GLUCOPHAGE-XR) 500 MG 24 hr tablet Take 2 tablets by mouth 2 (Two) Times a Day.      metoprolol succinate XL (TOPROL-XL) 50 MG 24 hr tablet Take 1 tablet by mouth Every Morning. 90 tablet 2    Ozempic, 2 MG/DOSE, 8 MG/3ML solution pen-injector Inject 2 mg under the skin into the appropriate area as directed 1 (One) Time Per Week. Currently taking 3 mg      rosuvastatin (CRESTOR) 20 MG tablet TAKE 1 TABLET BY MOUTH ONE TIME DAILY 90 tablet 1    vitamin B-12 (cyancobalamin) 100 MCG tablet Take 0.5 tablets by mouth Daily.      vitamin D (ERGOCALCIFEROL) 03371 UNITS capsule capsule Take 1 capsule by mouth Every 7 (Seven) Days. MONDAYS      [DISCONTINUED] metoprolol succinate XL (TOPROL-XL) 50 MG 24 hr tablet Take 1 tablet by mouth Every Morning. 90 tablet 1        Past Medical History:   Diagnosis Date    Ankle pain     Anxiety     Arthritis     Cataract     bilateral    GERD (gastroesophageal reflux disease)     H/O colonoscopy 08/2012    History of kidney stones     History of migraine     Hyperlipidemia     Hypertension     Knee swelling     Low back pain     Nonproliferative diabetic retinopathy     Osteopenia     PONV (postoperative nausea and vomiting)     Rotator cuff injury 09/2020    RIGHT    Rotator cuff syndrome   "   Stress fracture foot    Tear of meniscus of knee     Thyroid disease     now hypothyroidism s/p thyroidectomy    Type 2 diabetes mellitus with hyperglycemia, without long-term current use of insulin        OBJECTIVE    Vital Signs:   /70   Pulse 84   Temp 97.8 °F (36.6 °C) (Infrared)   Ht 154.9 cm (61\")   Wt 70.3 kg (155 lb)   SpO2 99%   BMI 29.29 kg/m²        Physical Exam  Vitals reviewed.   Constitutional:       General: She is not in acute distress.     Appearance: Normal appearance. She is not ill-appearing.   Eyes:      General: No scleral icterus.  Cardiovascular:      Rate and Rhythm: Normal rate and regular rhythm.      Heart sounds: Normal heart sounds. No murmur heard.  Pulmonary:      Effort: Pulmonary effort is normal. No respiratory distress.      Breath sounds: Normal breath sounds. No wheezing.   Abdominal:      General: Bowel sounds are normal. There is no distension.      Palpations: Abdomen is soft.      Tenderness: There is no abdominal tenderness. There is no guarding.   Musculoskeletal:      Right lower leg: No edema.      Left lower leg: No edema.      Comments: TTP in the left groin. No TTP on the lateral aspect of the hip in the area of the greater trochanter. With passive movement of the hip she reports pain int he buttocks and along the IT band.   Skin:     Coloration: Skin is not jaundiced.   Neurological:      Mental Status: She is alert.   Psychiatric:         Mood and Affect: Mood normal.         Behavior: Behavior normal.         Thought Content: Thought content normal.                             ASSESSMENT & PLAN     Diagnoses and all orders for this visit:    1. Benign essential hypertension (Primary)  -taking metoprolol succinate 50 mg daily. Doesn't check BP at home.  -BP well-controlled in office 126/70 today.  Continue current regimen.  I reviewed 10/16/2024 CMP from [facility] which showed normal renal function and electrolytes.  -     metoprolol succinate XL " (TOPROL-XL) 50 MG 24 hr tablet; Take 1 tablet by mouth Every Morning.  Dispense: 90 tablet; Refill: 2    2. Constipation, unspecified constipation type      -States for years she has bowel movements that are every day to every 2 days and can sometimes feel constipated. No blood in the stool. She used miralax for 4 months every day which helped. She last used it months ago. Not currently taking fiber.       -bowel sounds normal today, abdominal exam benign     -advised against use of miralax more than 1-2 times weekly due to risk of electrolyte imbalance      -recommend she start fiber supplement otc and if no improvement with fiber supplement consider adding in docusate stool softener     3. Acquired hypothyroidism  -had a thyroidectomy due to nodules, denies thyroid cancer history, and is now on levothyroxine 100 mcg every day, increased from 6 days weekly by endocrinology 10/2024.  -I reviewed [facility] progress note from 10/18/2024 as well as 10/16/2024 TSH which was  High at 6.21 with normal free T4.  Recheck TSH and adjust regimen further as necessary.  -     TSH Rfx On Abnormal To Free T4    4. Left groin pain  -States in the left groin she can have pain and feels a catching sensation when going from a seated to standing position. Never hurts when seated. The pain is rapid onset and rapid relief after she starts moving. This has been going on for a year or so.  -exam concerning for muscular and IT band etiology but will obtain xray given duration of symptoms to assess for any arthritis. Likely refer to PT after xray results are in.  -     XR Hip With or Without Pelvis 2 - 3 View Left    5. Fall on ice       -provided reassurance that her fall did not seem related to a cardiac etiology, which is her concern, and that her heart and lung sounds are normal today. Clear inciting factor was a fall while walking on ice. No head trauma or LOC. With no additional injuries reported, no further eval is  necessary.         Follow Up  Return in about 6 months (around 7/9/2025) for Annual physical.    Patient/family had no further questions at this time and verbalized understanding of the plan discussed today.

## 2025-01-17 LAB
T4 FREE SERPL-MCNC: 2.14 NG/DL (ref 0.93–1.7)
TSH SERPL DL<=0.005 MIU/L-ACNC: 0.03 UIU/ML (ref 0.27–4.2)

## 2025-06-23 RX ORDER — ROSUVASTATIN CALCIUM 20 MG/1
20 TABLET, COATED ORAL DAILY
Qty: 90 TABLET | Refills: 2 | Status: SHIPPED | OUTPATIENT
Start: 2025-06-23

## 2025-06-30 RX ORDER — CITALOPRAM HYDROBROMIDE 40 MG/1
40 TABLET ORAL EVERY MORNING
Qty: 90 TABLET | Refills: 1 | Status: SHIPPED | OUTPATIENT
Start: 2025-06-30

## 2025-07-03 ENCOUNTER — OFFICE VISIT (OUTPATIENT)
Dept: INTERNAL MEDICINE | Facility: CLINIC | Age: 65
End: 2025-07-03
Payer: COMMERCIAL

## 2025-07-03 VITALS
HEIGHT: 61 IN | TEMPERATURE: 97.1 F | HEART RATE: 82 BPM | WEIGHT: 154 LBS | OXYGEN SATURATION: 97 % | DIASTOLIC BLOOD PRESSURE: 60 MMHG | SYSTOLIC BLOOD PRESSURE: 110 MMHG | BODY MASS INDEX: 29.07 KG/M2

## 2025-07-03 DIAGNOSIS — E11.3291 TYPE 2 DIABETES MELLITUS WITH RIGHT EYE AFFECTED BY MILD NONPROLIFERATIVE RETINOPATHY WITHOUT MACULAR EDEMA, WITHOUT LONG-TERM CURRENT USE OF INSULIN: ICD-10-CM

## 2025-07-03 DIAGNOSIS — Z23 NEED FOR PNEUMOCOCCAL VACCINE: ICD-10-CM

## 2025-07-03 DIAGNOSIS — Z00.00 ANNUAL PHYSICAL EXAM: Primary | ICD-10-CM

## 2025-07-03 LAB
BASOPHILS # BLD AUTO: 0.11 10*3/MM3 (ref 0–0.2)
BASOPHILS NFR BLD AUTO: 1.2 % (ref 0–1.5)
EOSINOPHIL # BLD AUTO: 0.12 10*3/MM3 (ref 0–0.4)
EOSINOPHIL NFR BLD AUTO: 1.3 % (ref 0.3–6.2)
ERYTHROCYTE [DISTWIDTH] IN BLOOD BY AUTOMATED COUNT: 12.7 % (ref 12.3–15.4)
HCT VFR BLD AUTO: 42.4 % (ref 34–46.6)
HGB BLD-MCNC: 13.9 G/DL (ref 12–15.9)
IMM GRANULOCYTES # BLD AUTO: 0.03 10*3/MM3 (ref 0–0.05)
IMM GRANULOCYTES NFR BLD AUTO: 0.3 % (ref 0–0.5)
LYMPHOCYTES # BLD AUTO: 2.8 10*3/MM3 (ref 0.7–3.1)
LYMPHOCYTES NFR BLD AUTO: 30.7 % (ref 19.6–45.3)
MCH RBC QN AUTO: 27.1 PG (ref 26.6–33)
MCHC RBC AUTO-ENTMCNC: 32.8 G/DL (ref 31.5–35.7)
MCV RBC AUTO: 82.8 FL (ref 79–97)
MONOCYTES # BLD AUTO: 0.66 10*3/MM3 (ref 0.1–0.9)
MONOCYTES NFR BLD AUTO: 7.2 % (ref 5–12)
NEUTROPHILS # BLD AUTO: 5.41 10*3/MM3 (ref 1.7–7)
NEUTROPHILS NFR BLD AUTO: 59.3 % (ref 42.7–76)
NRBC BLD AUTO-RTO: 0 /100 WBC (ref 0–0.2)
PLATELET # BLD AUTO: 429 10*3/MM3 (ref 140–450)
RBC # BLD AUTO: 5.12 10*6/MM3 (ref 3.77–5.28)
WBC # BLD AUTO: 9.13 10*3/MM3 (ref 3.4–10.8)

## 2025-07-03 RX ORDER — LEVOTHYROXINE SODIUM 75 UG/1
75 TABLET ORAL DAILY
COMMUNITY
Start: 2025-06-20

## 2025-07-03 RX ORDER — CLOBETASOL PROPIONATE 0.5 MG/ML
SOLUTION TOPICAL
COMMUNITY
Start: 2025-06-26

## 2025-07-03 NOTE — LETTER
[facility]  Vaccine Consent Form    Patient Name:  Lynda Rueda  Patient :  1960     Vaccine(s) Ordered    Pneumococcal Conjugate Vaccine 21-Valent All        Screening Checklist  The following questions should be completed prior to vaccination. If you answer “yes” to any question, it does not necessarily mean you should not be vaccinated. It just means we may need to clarify or ask more questions. If a question is unclear, please ask your healthcare provider to explain it.    Yes No   Any fever or moderate to severe illness today (mild illness and/or antibiotic treatment are not contraindications)?     Do you have a history of a serious reaction to any previous vaccinations, such as anaphylaxis, encephalopathy within 7 days, Guillain-Wheelwright syndrome within 6 weeks, seizure?     Have you received any live vaccine(s) (e.g MMR, GAIL) or any other vaccines in the last month (to ensure duplicate doses aren't given)?     Do you have an anaphylactic allergy to latex (DTaP, DTaP-IPV, Hep A, Hep B, MenB, RV, Td, Tdap), baker’s yeast (Hep B, HPV), polysorbates (RSV, nirsevimab, PCV 20 and 21, Rotavirrus, Tdap, Shingrix), or gelatin (GAIL, MMR)?     Do you have an anaphylactic allergy to neomycin (Rabies, GAIL, MMR, IPV, Hep A), polymyxin B (IPV), or streptomycin (IPV)?      Any cancer, leukemia, AIDS, or other immune system disorder? (GAIL, MMR, RV)     Do you have a parent, brother, or sister with an immune system problem (if immune competence of vaccine recipient clinically verified, can proceed)? (MMR, GAIL)     Any recent steroid treatments for >2 weeks, chemotherapy, or radiation treatment? (GAIL, MMR)     Have you received antibody-containing blood transfusions or IVIG in the past 11 months (recommended interval is dependent on product)? (MMR, GAIL)     Have you taken antiviral drugs (acyclovir, famciclovir, valacyclovir for GAIL) in the last 24 or 48 hours, respectively?      Are you pregnant or planning to  "become pregnant within 1 month? (GAIL, MMR, HPV, IPV, MenB, Abrexvy; For Hep B- refer to Engerix-B; For RSV - Abrysvo is indicated for 32-36 weeks of pregnancy from September to January)     For infants, have you ever been told your child has had intussusception or a medical emergency involving obstruction of the intestine (Rotavirus)? If not for an infant, can skip this question.         *Ordering Physicians/APC should be consulted if \"yes\" is checked by the patient or guardian above.  I have received, read, and understand the Vaccine Information Statement (VIS) for each vaccine ordered.  I have considered my or my child's health status as well as the health status of my close contacts.  I have taken the opportunity to discuss my vaccine questions with my or my child's health care provider.   I have requested that the ordered vaccine(s) be given to me or my child.  I understand the benefits and risks of the vaccines.  I understand that I should remain in the clinic for 15 minutes after receiving the vaccine(s).  _________________________________________________________  Signature of Patient or Parent/Legal Guardian ____________________  Date   "

## 2025-07-03 NOTE — PROGRESS NOTES
Shun Miranda DO, FACP  Internal Medicine  [facility]  [address], [address]  [place] [address]  [phone]    Chief Complaint  Annual checkup    HISTORY    Lynda Rueda is a 64 y.o. female who presents to the office today as a  an established patient for their annual preventative exam.     No hospitalization(s) within the last year.     Current exercise regimen: none    Status of chronic medical conditions:    Vitamin D deficiency: takes vitamin D 50,000 units every other weekly     Osteopenia: follows with GYN [facility], taking vitamin D supplement as above.     Type 2 diabetes with diabetic retinopathy : goes to [facility]. A1c at endocrinology 4/2025 was 6.4. Taking empagliflozin 25 mg daily, ozempic 2 mg weekly, metformin ER 1000 mg twice daily as prescribed by them.  Follows with eye doctor regularly.      HLD: taking rosuvastatin 20 mg daily and fenofibrate 160 mg daily . Last lipid profile at Endocrinology 4/21/25 Trigycerides 90,  HDL 47, LDL 58    Anxiety and Depression: taking citalopram 40 mg daily, states this is doing well     Seasonal allergies: takes allegra as needed    HTN: taking metoprolol succinate 50 mg daily. Doesn't check BP at home.    Hypothyroidism: had a thyroidectomy due to nodules, denies thyroid cancer history, and is now on levothyroxine 75 mcg daily. Prescribed by endocrinology.      Patient's overall comments about their health or any other specific health concerns they report: none    GYN History:      *Patient's GYN Practice: [facility]     *s/p hysterectomy     *Previous pregnancies: 2.  Live births: 1.  Miscarriages: 1 . Elective abortions: 0.      Health Maintenance Summary            Current Care Gaps       DIABETIC FOOT EXAM (Yearly) Overdue since 7/5/2019 07/05/2018  SmartData: FINDINGS - TESTS - NEUROLOGY - MONOFILAMENT TEST - MONOFILAMENT TEST PERFORMED    07/05/2018  SmartData: WORKFLOW - DIABETES - DIABETIC  FOOT EXAM PERFORMED    07/05/2018  SmartData: WORKFLOW - DIABETES - DIABETIC FOOT EXAM PERFORMED    07/05/2018  Done    10/04/2016  SmartData: WORKFLOW - DIABETES - DIABETIC FOOT EXAM PERFORMED     Only the first 5 history entries have been loaded, but more history exists.            DIABETIC EYE EXAM (Yearly) Overdue since 10/18/2023      10/18/2022  SCANNED - EYE EXAM    09/09/2021  SCANNED - EYE EXAM    03/13/2019  SCANNED - EYE EXAM    02/14/2018  SCANNED - EYE EXAM    07/07/2016  Reason not specified      Only the first 5 history entries have been loaded, but more history exists.              Pneumococcal Vaccine 50+ (3 of 3 - PCV20 or PCV21) Overdue since 10/4/2024      10/04/2019  Imm Admin: Pneumococcal Polysaccharide (PPSV23)    11/11/2013  Imm Admin: Pneumococcal, Unspecified    11/11/2013  Imm Admin: Pneumococcal Conjugate 13-Valent (PCV13)              MAMMOGRAM (Every 2 Years) Overdue since 3/16/2025      03/16/2023  SCANNED - MAMMO    03/03/2022  SCANNED - MAMMO    02/04/2016  Reason not specified              INFLUENZA VACCINE (Yearly - July to March) Due since 7/1/2025 11/01/2024  Imm Admin: Influenza, Unspecified    10/12/2024  Imm Admin: Influenza recombinant    11/01/2023  Imm Admin: INFLUENZA NASAL UF    10/06/2023  Imm Admin: Flublok 18+yrs    10/19/2022  Imm Admin: Influenza Injectable Mdck Pf Quad      Only the first 5 history entries have been loaded, but more history exists.                      Awaiting Completion       URINE MICROALBUMIN-CREATININE RATIO (uACR) (Yearly) Order placed this encounter      07/03/2025  Order placed for Microalbumin / Creatinine Urine Ratio - Urine, Clean Catch by Shun Miranda, DO    10/04/2016  Reason not specified                      Upcoming       HEMOGLOBIN A1C (Every 6 Months) Next due on 10/22/2025      04/22/2025  POCT GLYCOSYLATED HEMOGLOBIN (HGB A1C)    04/21/2025  HEMOGLOBIN A1C    10/16/2024  HEMOGLOBIN A1C    04/11/2024  Hemoglobin A1C component  of HEMOGLOBIN A1C    11/01/2023  Hemoglobin A1C component of HEMOGLOBIN A1C      Only the first 5 history entries have been loaded, but more history exists.              TDAP/TD VACCINES (2 - Td or Tdap) Next due on 11/24/2025 11/24/2015  Imm Admin: Tdap              LIPID PANEL (Yearly) Next due on 4/21/2026 04/21/2025  LIPID PANEL    10/16/2024  LIPID PANEL    04/11/2024  LIPID PANEL    11/01/2023  LIPID PANEL    01/19/2023  LIPID PANEL      Only the first 5 history entries have been loaded, but more history exists.              ANNUAL PHYSICAL (Yearly) Next due on 7/3/2026      07/03/2025  Done    06/06/2024  Done    05/22/2023  Done    05/19/2022  Done              COLORECTAL CANCER SCREENING (COLONOSCOPY - Every 7 Years) Next due on 7/15/2029      07/15/2022  Surgical Procedure: COLONOSCOPY    07/15/2022  COLONOSCOPY    06/15/2018  COLONOSCOPY    06/15/2018  Surgical Procedure: COLONOSCOPY    05/05/2011  COLONOSCOPY (Reason not specified)      Only the first 5 history entries have been loaded, but more history exists.                      Completed or No Longer Recommended       HEPATITIS C SCREENING  Completed      07/05/2019  HEPATITIS C ANTIBODY    10/04/2016  Declined              COVID-19 Vaccine (Series Information) Completed      10/12/2024  Imm Admin: COVID-19 (PFIZER) 12YRS+ (COMIRNATY)    10/06/2023  Imm Admin: COVID-19 (PFIZER) 12YRS+ (COMIRNATY)    10/26/2021  Imm Admin: COVID-19 (MODERNA) Monovalent Original Booster    03/01/2021  Imm Admin: COVID-19 (MODERNA) Monovalent Original Booster    02/01/2021  Imm Admin: COVID-19 (MODERNA) Monovalent Original Booster      Only the first 5 history entries have been loaded, but more history exists.              ZOSTER VACCINE (Series Information) Completed      01/09/2025  Imm Admin: Shingrix    10/06/2020  Imm Admin: Shingrix                             Allergies   Allergen Reactions    Grapefruit Itching    Carbomer Other (See Comments) and Hives  "    \"Acrylic\" - unknown reaction    Celecoxib Hives    Dulaglutide GI Intolerance     Abdominal pain, constipation    Methocarbamol Hives     ROBAXIN     Montelukast Hives    Penicillins Hives    Choudrant Other (See Comments)     UNKNOWN PER PT/POSITIVE ALLERGY TESTING    Gemifloxacin Hives    Grapefruit Extract Itching    Nickel Itching     WHEN PT TOOK THE ALLERGY TEST NICKEL, ACRYLIC AND COBALT CAME UP AS ALLERGIES    Oseltamivir Rash    Pollen Extract Other (See Comments)    Tamiflu [Oseltamivir Phosphate] Rash    Watermelon [Citrullus Vulgaris] Itching        Outpatient Medications Marked as Taking for the 7/3/25 encounter (Office Visit) with Shun Miranda DO   Medication Sig Dispense Refill    citalopram (CeleXA) 40 MG tablet TAKE 1 TABLET BY MOUTH EVERY DAY IN THE MORNING 90 tablet 1    clobetasol (TEMOVATE) 0.05 % external solution       empagliflozin (JARDIANCE) 25 MG tablet tablet Take 1 tablet by mouth Every Morning.      estradiol (ESTRACE) 0.1 MG/GM vaginal cream Insert  into the vagina Daily As Needed (IRRITATION).      fenofibrate 160 MG tablet TAKE 1 TABLET BY MOUTH EVERY DAY (Patient taking differently: Take 1 tablet by mouth Every Morning.) 90 tablet 1    fexofenadine (ALLEGRA) 180 MG tablet Take 1 tablet by mouth Every Night.      levothyroxine (SYNTHROID, LEVOTHROID) 75 MCG tablet Take 1 tablet by mouth Daily.      metFORMIN ER (GLUCOPHAGE-XR) 500 MG 24 hr tablet Take 2 tablets by mouth 2 (Two) Times a Day.      metoprolol succinate XL (TOPROL-XL) 50 MG 24 hr tablet Take 1 tablet by mouth Every Morning. 90 tablet 2    Ozempic, 2 MG/DOSE, 8 MG/3ML solution pen-injector Inject 2 mg under the skin into the appropriate area as directed 1 (One) Time Per Week. Currently taking 3 mg      rosuvastatin (CRESTOR) 20 MG tablet TAKE 1 TABLET BY MOUTH ONCE DAILY 90 tablet 2    vitamin B-12 (cyancobalamin) 100 MCG tablet Take 0.5 tablets by mouth Daily.      vitamin D (ERGOCALCIFEROL) 39034 UNITS capsule capsule " Take 1 capsule by mouth Every 7 (Seven) Days.           Past Medical History:   Diagnosis Date    Ankle pain     Anxiety     Arthritis     Cataract     bilateral    GERD (gastroesophageal reflux disease)     H/O colonoscopy 2012    History of kidney stones     History of migraine     Hyperlipidemia     Hypertension     Knee swelling     Low back pain     Nonproliferative diabetic retinopathy     Osteopenia     PONV (postoperative nausea and vomiting)     Rotator cuff injury 2020    RIGHT    Rotator cuff syndrome     Stress fracture foot    Tear of meniscus of knee     Thyroid disease     now hypothyroidism s/p thyroidectomy    Type 2 diabetes mellitus with hyperglycemia, without long-term current use of insulin      Past Surgical History:   Procedure Laterality Date    ANKLE ARTHROPLASTY Right 2024    Procedure: RIGHT  TOTAL ANKLE REPLACEMENT, SECONDARY LATERAL LIGAMENT RECONSTRUCTION;  Surgeon: Jim Thomas Jr., MD;  [place]  [facility] [place] [facility] [place]ics;  Laterality: Right;    APPENDECTOMY      BREAST BIOPSY Right     BENIGN     SECTION      CHOLECYSTECTOMY  1985    COLONOSCOPY N/A 06/15/2018    Procedure: COLONOSCOPY INTO CECUM AND T.I. WITH BIOPSIES AND COLD BIOPSY POLYPECTOMY;  Surgeon: Sunil Cardona MD;  Location:  [facility];  Service: Gastroenterology    COLONOSCOPY N/A 07/15/2022    Procedure: COLONOSCOPY to cecum with cold snare polypectomy;  Surgeon: Sunil Cardona MD;  Location:  [facility];  Service: Gastroenterology;  Laterality: N/A;  pre- hx colon polyps   post- polyp     DILATATION AND CURETTAGE      HYSTERECTOMY  2007    OVARIAN MASS/SMALL INTESTINE    KNEE SURGERY Left 2013    meniscal surgery    LASIK Bilateral     OVARIAN CYST REMOVAL  2004    RHINOPLASTY      SHOULDER ARTHROSCOPY W/ ROTATOR CUFF REPAIR Right 2021    Procedure: SHOULDER ARTHROSCOPY WITH ROTATOR CUFF REPAIR, subacromial decompression;  Surgeon: Juan Pablo Major MD;   [place] [facility] [place]  Service: Orthopedics;  Laterality: Right;    SHOULDER SURGERY  02/2021    right    THYROIDECTOMY  07/2016    TRIGGER POINT INJECTION       Family History   Problem Relation Age of Onset    COPD Mother     Hyperlipidemia Mother     Hypertension Mother     Melanoma Mother     Heart failure Mother     Transient ischemic attack Mother     Osteoporosis Mother     Scoliosis Mother     Colon cancer Father 80    Cancer Father         colon    Thyroid disease Sister     Depression Sister     Bipolar disorder Sister     Dislocations Sister         collar bone    Severe sprains Sister     Heart attack Brother 63    Diabetes Brother     Malig Hyperthermia Neg Hx     reports that she has never smoked. She has been exposed to tobacco smoke. She has never used smokeless tobacco. She reports current alcohol use. She reports that she does not use drugs.    Immunization History   Administered Date(s) Administered    COVID-19 (MODERNA) Monovalent Original Booster 02/01/2021, 03/01/2021, 10/26/2021    COVID-19 (PFIZER) 12YRS+ (COMIRNATY) 10/06/2023, 10/12/2024    Flu Vaccine Split Quad 10/10/2021    FluMist 2-49yrs 10/02/2015    FluMist 2-49yrs (Nasal) 10/02/2015    Flublok 18+yrs 10/06/2023    Flucelvax Quad Vial =>4yrs 10/04/2020    Fluzone  >6mos 10/04/2016, 12/02/2018    Fluzone (or Fluarix & Flulaval for VFC) >6mos 10/02/2015, 10/20/2017, 10/20/2017, 12/02/2018, 10/04/2019, 10/10/2021    Fluzone Quad >6mos (Multi-dose) 10/04/2016, 12/02/2018    INFLUENZA NASAL UF 11/01/2023    Influenza Injectable Mdck Pf Quad 10/19/2022    Influenza Split Preservative Free ID 10/04/2016, 12/02/2018    Influenza recombinant 10/12/2024    Influenza, Unspecified 10/02/2015, 10/20/2017, 12/02/2018, 10/04/2019, 10/10/2021, 10/19/2022, 11/01/2024    Pneumococcal Conjugate 13-Valent (PCV13) 11/11/2013    Pneumococcal Polysaccharide (PPSV23) 10/04/2019    Pneumococcal, Unspecified 11/11/2013    Shingrix 10/06/2020, 01/09/2025     "Tdap 11/24/2015        OBJECTIVE    Vital Signs:   /60   Pulse 82   Temp 97.1 °F (36.2 °C) (Infrared)   Ht 154.9 cm (61\")   Wt 69.9 kg (154 lb)   SpO2 97%   BMI 29.10 kg/m²     Physical Exam  Vitals reviewed.   Constitutional:       General: She is not in acute distress.     Appearance: Normal appearance.   HENT:      Head: Normocephalic and atraumatic.      Right Ear: Tympanic membrane, ear canal and external ear normal. There is no impacted cerumen.      Left Ear: Tympanic membrane, ear canal and external ear normal. There is no impacted cerumen.      Mouth/Throat:      Mouth: Mucous membranes are moist.      Pharynx: No oropharyngeal exudate or posterior oropharyngeal erythema.   Eyes:      General: No scleral icterus.     Extraocular Movements: Extraocular movements intact.      Conjunctiva/sclera: Conjunctivae normal.      Pupils: Pupils are equal, round, and reactive to light.   Cardiovascular:      Rate and Rhythm: Normal rate and regular rhythm.      Heart sounds: Normal heart sounds. No murmur heard.  Pulmonary:      Effort: Pulmonary effort is normal. No respiratory distress.      Breath sounds: Normal breath sounds. No wheezing.   Abdominal:      General: Bowel sounds are normal. There is no distension.      Palpations: Abdomen is soft.      Tenderness: There is no abdominal tenderness. There is no guarding.   Musculoskeletal:      Cervical back: Neck supple.      Right lower leg: No edema.      Left lower leg: No edema.   Lymphadenopathy:      Cervical: No cervical adenopathy.   Skin:     General: Skin is warm and dry.      Coloration: Skin is not jaundiced.   Neurological:      General: No focal deficit present.      Mental Status: She is alert and oriented to person, place, and time.      Cranial Nerves: No cranial nerve deficit.      Motor: No weakness.   Psychiatric:         Mood and Affect: Mood normal.         Behavior: Behavior normal.         Thought Content: Thought content normal. "                         The ASCVD Risk score (Alan FAUST, et al., 2019) failed to calculate for the following reasons:    Cannot find a previous HDL lab    Cannot find a previous total cholesterol lab     ASSESSMENT & PLAN     #Annual Preventative Health Examination   -Age and sex appropriate physical exam performed and documented. Updated past medical, family, social and surgical histories as well as allergies and care team list. Addressed care gaps listed in the medical record.  -Encouraged annual dental and vision exams as part of their overall health.  -Encouraged minimum of 30 minutes or more of exercise at a brisk walk or higher 5 days per week combined with a well-balanced diet.  -Immunizations reviewed and updated in EMR. Influenza, RSV (Respiratory Syncytial Virus), and Prevnar 21 recommended.  -Lipid screening:  Patient is already on statin therapy..   -Aspirin for primary or secondary prevention: Not applicable, patient is greater than age 60 and risks outweigh benefits for primary prevention.  -Depression and Anxiety screening: Patient has known diagnosis of depression and / or anxiety.  -Diabetes screening:  Patient already has a diagnosis of diabetes mellitus and is being treated.   -Tobacco use screening: Conducted and addressed if indicated.   -Alcohol use screening: Conducted and addressed if indicated.   -Illicit drug screening: Conducted and addressed if indicated.   -Hypertension screening: Patient with known diagnosis of hypertension and is receiving treatment.  -HIV screening: pt declined screening  -Syphilis screening: Syphilis screening not indicated.  -Hepatitis B virus screening: Screening not indicated, not in a high-risk group.  -Hepatitis C virus screening:  Patient has already completed Hepatitis C screening. Negative screening on file.   -Colon cancer screening: Patient is already up to date on their colon cancer screening with colonoscopy is indicated again in 2029  -Lung cancer  screening: Patient has never smoked.  -Cervical cancer screening:  Will attempt to obtain PAP results   -Breast cancer screening:  Will attempt to obtain mammo results   -BRCA related cancer screening: Informed patient that the USPSTF recommends that primary care clinicians assess women with a personal or family history of breast, ovarian, tubal, or peritoneal cancer or who have an ancestry associated with breast cancer susceptibility 1 and 2 (BRCA1/2) gene mutations with an appropriate brief familial risk assessment tool and referred to genetic counseling if risk assessment is positive. Patient does not have a known personal or family history.   -Osteoporosis screening: known osteopenia, will attempt to get DEXA results from GYN    Follow up in 1 year for annual physical exam.    Patient/family had no further questions at this time and verbalized understanding of the plan discussed today.     A problem-based visit was also conducted on the same day, see below for assessment and plan    Diagnoses and all orders for this visit:    1. Type 2 diabetes mellitus with right eye affected by mild nonproliferative retinopathy without macular edema, without long-term current use of insulin (Primary)  - goes to [facility]. A1c at endocrinology 4/2025 was 6.4. Taking empagliflozin 25 mg daily, ozempic 2 mg weekly, metformin ER 1000 mg twice daily as prescribed by them.  Follows with eye doctor regularly.    -I reviewed labs from [facility] including CMP, A1c, lipid profile. All normal/acceptable. Check CBC today as well as Urine microalbumin/Cr .  Med management per endocrinology.          The following social determinates of health impact the patient's medical decision making: No social determinates of health were factored in to today's visit.     Follow Up  Return in about 6 months (around 1/3/2026) for Recheck.

## 2025-07-05 LAB
ALBUMIN/CREAT UR: 7 MG/G CREAT (ref 0–29)
CREAT UR-MCNC: 50.7 MG/DL
MICROALBUMIN UR-MCNC: 3.3 UG/ML

## (undated) DEVICE — CANN TRPL DAM 7X7MM NO VLV

## (undated) DEVICE — CANN NASL CO2 TRULINK W/O2 A/

## (undated) DEVICE — TUBING SET, GRAVITY, 4-SPIKE

## (undated) DEVICE — PREP SOL POVIDONE/IODINE BT 4OZ

## (undated) DEVICE — SNAR POLYP SENSATION STDOVL 27 240 BX40

## (undated) DEVICE — BLD SHAVER BONECUTTER 4MM 13CM

## (undated) DEVICE — DRAPE,U/ SHT,SPLIT,PLAS,STERIL: Brand: MEDLINE

## (undated) DEVICE — TUBING, SUCTION, 1/4" X 10', STRAIGHT: Brand: MEDLINE

## (undated) DEVICE — EMERALD ARTHROSCOPY SHEET: Brand: CONVERTORS

## (undated) DEVICE — SUT ETHLN 3/0 PS1 18IN 1663H

## (undated) DEVICE — THE SINGLE USE ETRAP – POLYP TRAP IS USED FOR SUCTION RETRIEVAL OF ENDOSCOPICALLY REMOVED POLYPS.: Brand: ETRAP

## (undated) DEVICE — SOL ISO/ALC RUB 70PCT 4OZ

## (undated) DEVICE — GLV SURG SIGNATURE ESSENTIAL PF LTX SZ6.5

## (undated) DEVICE — PK ARTHSCP SHLDR TOWER 40

## (undated) DEVICE — LN SMPL CO2 SHTRM SD STREAM W/M LUER

## (undated) DEVICE — ADAPT CLN BIOGUARD AIR/H2O DISP

## (undated) DEVICE — SINGLE-USE BIOPSY FORCEPS: Brand: RADIAL JAW 4

## (undated) DEVICE — DRSNG WND GZ CURAD OIL EMULSION 3X3IN STRL

## (undated) DEVICE — GLV SURG SIGNATURE ESSENTIAL PF LTX SZ8.5

## (undated) DEVICE — GOWN,NON-REINFORCED,SIRUS,SET IN SLV,XXL: Brand: MEDLINE

## (undated) DEVICE — KT ORCA ORCAPOD DISP STRL

## (undated) DEVICE — SENSR O2 OXIMAX FNGR A/ 18IN NONSTR

## (undated) DEVICE — POSTN ARMSLV LAT/TRACTION DISP

## (undated) DEVICE — GLV SURG BIOGEL LTX PF 8 1/2

## (undated) DEVICE — SKIN PREP TRAY W/CHG: Brand: MEDLINE INDUSTRIES, INC.

## (undated) DEVICE — APPL CHLORAPREP HI/LITE 26ML ORNG

## (undated) DEVICE — TP NDL SCORPION MULTIFIRE

## (undated) DEVICE — ABL ASP APOLLO RF XL 90D

## (undated) DEVICE — GLV SURG BIOGEL LTX PF 6 1/2

## (undated) DEVICE — CANN O2 ETCO2 FITS ALL CONN CO2 SMPL A/ 7IN DISP LF

## (undated) DEVICE — THE TORRENT IRRIGATION SCOPE CONNECTOR IS USED WITH THE TORRENT IRRIGATION TUBING TO PROVIDE IRRIGATION FLUIDS SUCH AS STERILE WATER DURING GASTROINTESTINAL ENDOSCOPIC PROCEDURES WHEN USED IN CONJUNCTION WITH AN IRRIGATION PUMP (OR ELECTROSURGICAL UNIT).: Brand: TORRENT

## (undated) DEVICE — Device: Brand: DEFENDO AIR/WATER/SUCTION AND BIOPSY VALVE